# Patient Record
Sex: MALE | Race: WHITE | NOT HISPANIC OR LATINO | Employment: FULL TIME | URBAN - METROPOLITAN AREA
[De-identification: names, ages, dates, MRNs, and addresses within clinical notes are randomized per-mention and may not be internally consistent; named-entity substitution may affect disease eponyms.]

---

## 2018-09-04 ENCOUNTER — HOSPITAL ENCOUNTER (OUTPATIENT)
Dept: FAMILY MEDICINE CLINIC | Facility: CLINIC | Age: 38
Setting detail: SPECIMEN
Discharge: HOME | End: 2018-09-04
Attending: FAMILY MEDICINE | Admitting: FAMILY MEDICINE

## 2018-09-04 LAB
ALBUMIN SERPL-MCNC: 4.1 G/DL (ref 3.5–4.8)
ALBUMIN/GLOB SERPL: 1.6 {RATIO} (ref 1–1.7)
ALP SERPL-CCNC: 56 IU/L (ref 32–91)
ALT SERPL-CCNC: 22 IU/L (ref 17–63)
ANION GAP SERPL CALC-SCNC: 12.8 MMOL/L (ref 10–20)
AST SERPL-CCNC: 23 IU/L (ref 15–41)
BASOPHILS # BLD AUTO: 0.1 10*3/UL (ref 0–0.2)
BASOPHILS NFR BLD AUTO: 1 % (ref 0–2)
BILIRUB SERPL-MCNC: 0.8 MG/DL (ref 0.3–1.2)
BILIRUB UR QL STRIP: NEGATIVE MG/DL
BUN SERPL-MCNC: 14 MG/DL (ref 8–20)
BUN/CREAT SERPL: 10.8 (ref 6.2–20.3)
CALCIUM SERPL-MCNC: 9.2 MG/DL (ref 8.9–10.3)
CASTS URNS QL MICRO: NORMAL /[LPF]
CHLORIDE SERPL-SCNC: 104 MMOL/L (ref 101–111)
CHOLEST SERPL-MCNC: 220 MG/DL
CHOLEST/HDLC SERPL: 6.5 {RATIO}
COLOR UR: YELLOW
CONV BACTERIA IN URINE MICRO: NEGATIVE
CONV CLARITY OF URINE: CLEAR
CONV CO2: 28 MMOL/L (ref 22–32)
CONV HYALINE CASTS IN URINE MICRO: 0 /[LPF] (ref 0–5)
CONV LDL CHOLESTEROL DIRECT: 164 MG/DL (ref 0–100)
CONV PROTEIN IN URINE BY AUTOMATED TEST STRIP: NEGATIVE MG/DL
CONV SMALL ROUND CELLS: NORMAL /[HPF]
CONV TOTAL PROTEIN: 6.7 G/DL (ref 6.1–7.9)
CONV UROBILINOGEN IN URINE BY AUTOMATED TEST STRIP: 0.2 MG/DL
CREAT UR-MCNC: 1.3 MG/DL (ref 0.7–1.2)
CULTURE INDICATED?: NORMAL
DIFFERENTIAL METHOD BLD: (no result)
EOSINOPHIL # BLD AUTO: 0.2 10*3/UL (ref 0–0.3)
EOSINOPHIL # BLD AUTO: 2 % (ref 0–3)
ERYTHROCYTE [DISTWIDTH] IN BLOOD BY AUTOMATED COUNT: 13.2 % (ref 11.5–14.5)
GLOBULIN UR ELPH-MCNC: 2.6 G/DL (ref 2.5–3.8)
GLUCOSE SERPL-MCNC: 89 MG/DL (ref 65–99)
GLUCOSE UR QL: NEGATIVE MG/DL
HCT VFR BLD AUTO: 43.9 % (ref 40–54)
HDLC SERPL-MCNC: 34 MG/DL
HGB BLD-MCNC: 14.8 G/DL (ref 14–18)
HGB UR QL STRIP: NEGATIVE
KETONES UR QL STRIP: NEGATIVE MG/DL
LDLC/HDLC SERPL: 4.9 {RATIO}
LEUKOCYTE ESTERASE UR QL STRIP: NEGATIVE
LIPID INTERPRETATION: ABNORMAL
LYMPHOCYTES # BLD AUTO: 2.1 10*3/UL (ref 0.8–4.8)
LYMPHOCYTES NFR BLD AUTO: 23 % (ref 18–42)
MCH RBC QN AUTO: 28.3 PG (ref 26–32)
MCHC RBC AUTO-ENTMCNC: 33.7 G/DL (ref 32–36)
MCV RBC AUTO: 84.1 FL (ref 80–94)
MONOCYTES # BLD AUTO: 0.6 10*3/UL (ref 0.1–1.3)
MONOCYTES NFR BLD AUTO: 7 % (ref 2–11)
NEUTROPHILS # BLD AUTO: 5.9 10*3/UL (ref 2.3–8.6)
NEUTROPHILS NFR BLD AUTO: 67 % (ref 50–75)
NITRITE UR QL STRIP: NEGATIVE
NRBC BLD AUTO-RTO: 0 /100{WBCS}
NRBC/RBC NFR BLD MANUAL: 0 10*3/UL
PH UR STRIP.AUTO: 7 [PH] (ref 4.5–8)
PLATELET # BLD AUTO: 252 10*3/UL (ref 150–450)
PMV BLD AUTO: 9.2 FL (ref 7.4–10.4)
POTASSIUM SERPL-SCNC: 4.8 MMOL/L (ref 3.6–5.1)
RBC # BLD AUTO: 5.22 10*6/UL (ref 4.6–6)
RBC #/AREA URNS HPF: 0 /[HPF] (ref 0–3)
SODIUM SERPL-SCNC: 140 MMOL/L (ref 136–144)
SP GR UR: 1.02 (ref 1–1.03)
SPERM URNS QL MICRO: NORMAL /[HPF]
SQUAMOUS SPT QL MICRO: 0 /[HPF] (ref 0–5)
TRIGL SERPL-MCNC: 110 MG/DL
UNIDENT CRYS URNS QL MICRO: NORMAL /[HPF]
VLDLC SERPL CALC-MCNC: 22 MG/DL
WBC # BLD AUTO: 8.9 10*3/UL (ref 4.5–11.5)
WBC #/AREA URNS HPF: 0 /[HPF] (ref 0–5)
YEAST SPEC QL WET PREP: NORMAL /[HPF]

## 2019-12-04 RX ORDER — PHENTERMINE HYDROCHLORIDE 30 MG/1
CAPSULE ORAL
Qty: 30 CAPSULE | Refills: 0 | OUTPATIENT
Start: 2019-12-04

## 2019-12-04 NOTE — TELEPHONE ENCOUNTER
Patient left a voice message asking for this to be sent in for him.  Said he has been off of it for 6 months and he needs to start back on it again.

## 2019-12-07 DIAGNOSIS — E66.01 CLASS 2 SEVERE OBESITY DUE TO EXCESS CALORIES WITH SERIOUS COMORBIDITY AND BODY MASS INDEX (BMI) OF 38.0 TO 38.9 IN ADULT (HCC): Primary | ICD-10-CM

## 2019-12-09 RX ORDER — PHENTERMINE HYDROCHLORIDE 30 MG/1
CAPSULE ORAL
Qty: 30 CAPSULE | Refills: 0 | Status: SHIPPED | OUTPATIENT
Start: 2019-12-09 | End: 2020-01-06

## 2019-12-24 RX ORDER — NAPROXEN 500 MG/1
TABLET ORAL
Qty: 60 TABLET | Refills: 1 | Status: SHIPPED | OUTPATIENT
Start: 2019-12-24 | End: 2020-02-04 | Stop reason: HOSPADM

## 2020-01-05 DIAGNOSIS — E66.01 CLASS 2 SEVERE OBESITY DUE TO EXCESS CALORIES WITH SERIOUS COMORBIDITY AND BODY MASS INDEX (BMI) OF 38.0 TO 38.9 IN ADULT (HCC): ICD-10-CM

## 2020-01-06 RX ORDER — PHENTERMINE HYDROCHLORIDE 30 MG/1
CAPSULE ORAL
Qty: 30 CAPSULE | Refills: 0 | Status: SHIPPED | OUTPATIENT
Start: 2020-01-06 | End: 2020-03-27 | Stop reason: SDUPTHER

## 2020-01-27 ENCOUNTER — OFFICE VISIT (OUTPATIENT)
Dept: ORTHOPEDIC SURGERY | Facility: CLINIC | Age: 40
End: 2020-01-27

## 2020-01-27 VITALS
WEIGHT: 305 LBS | SYSTOLIC BLOOD PRESSURE: 151 MMHG | BODY MASS INDEX: 40.42 KG/M2 | DIASTOLIC BLOOD PRESSURE: 93 MMHG | HEART RATE: 69 BPM | HEIGHT: 73 IN

## 2020-01-27 DIAGNOSIS — S46.211A RUPTURE OF RIGHT DISTAL BICEPS TENDON, INITIAL ENCOUNTER: Primary | ICD-10-CM

## 2020-01-27 PROBLEM — I10 ESSENTIAL (PRIMARY) HYPERTENSION: Status: ACTIVE | Noted: 2017-10-11

## 2020-01-27 PROCEDURE — 99203 OFFICE O/P NEW LOW 30 MIN: CPT | Performed by: FAMILY MEDICINE

## 2020-01-27 NOTE — PROGRESS NOTES
"Primary Care Sports Medicine Office Visit Note     Patient ID: Scar Velásquez is a 39 y.o. male.    Chief Complaint:  Chief Complaint   Patient presents with   • Right Upper Arm - Initial Evaluation     HPI:    Mr. Scar Velásquez is a 39 y.o. male who presents to the clinic today for biceps tendon rupture evaluation. He states that 5 days ago, was lifting a trailer tongue with a co-worker. Felt two pops in his distal biceps. Did not experience all that much pain. Noticed later in the day that he had a defect and a new \"flat spot\" distally at the anterior elbow, and a bulge mid-belly portion of the biceps.  The next few days he noticed a considerable amount of swelling and bruising.  Still no significant or severe pain, but achiness at the level of elbow.  He also noticed immediate weakness of this arm now to elbow flexion activity.    Past Medical History:   Diagnosis Date   • Essential (primary) hypertension 10/11/2017   • Obesity 12/21/2015       Past Surgical History:   Procedure Laterality Date   • EAR TUBES         Family History   Problem Relation Age of Onset   • Cancer Mother    • Cancer Father      Social History     Occupational History   • Not on file   Tobacco Use   • Smoking status: Never Smoker   • Smokeless tobacco: Current User   Substance and Sexual Activity   • Alcohol use: Never     Frequency: Never   • Drug use: Not on file   • Sexual activity: Not on file      Review of Systems   Constitutional: Negative for activity change and fever.   Respiratory: Negative for cough and shortness of breath.    Cardiovascular: Negative for chest pain.   Gastrointestinal: Negative for constipation, diarrhea, nausea and vomiting.   Musculoskeletal: Positive for arthralgias.   Skin: Negative for color change and rash.   Neurological: Negative for weakness.   Hematological: Does not bruise/bleed easily.       Objective:    /93 (BP Location: Left arm, Patient Position: Sitting, Cuff Size: Large Adult)   Pulse " "69   Ht 185.4 cm (73\")   Wt (!) 138 kg (305 lb)   BMI 40.24 kg/m²     Physical Examination:  Physical Exam   Constitutional: He appears well-developed and well-nourished. No distress.   HENT:   Head: Normocephalic and atraumatic.   Eyes: Conjunctivae are normal.   Cardiovascular: Intact distal pulses.   Pulmonary/Chest: Effort normal. No respiratory distress.   Neurological: He is alert.   Skin: Skin is warm. Capillary refill takes less than 2 seconds. He is not diaphoretic.   Nursing note and vitals reviewed.    Right Elbow Exam     Tenderness   The patient is experiencing no tenderness.     Range of Motion   Extension: normal   Flexion: normal   Pronation: normal   Supination: normal     Muscle Strength   Pronation:  5/5   Supination:  4/5     Comments:  Flexion strength 3/5, painful.  There is a palpable Bony deformity to the mid muscle belly of the biceps, with no palpable distal biceps cord.  Moderate swelling with gravity distal to the elbow and the forearm and hand.  Distal pulses are intact.  There is moderate bruising to the antecubital fossa in multiple stages of resorption.          Imaging and other tests:  MRI elbow ordered today.    Assessment and Plan:    1. Rupture of right distal biceps tendon, initial encounter  - MRI Elbow Right Without Contrast; Future    I discussed with the patient and his wife today that ultimately he has likely completely torn his distal biceps insertion.  With the amount of retraction bruising that he has, and weakness, I feel that surgical repair is relatively his only option if he would like to have the function of his elbow for the rest of his life.  We discussed his case with our upper extremity surgeon Dr. Richard Berger, who will see him next week preoperatively.  Until then he can continue icing, elevation for swelling.  I would also like to go ahead and get an MRI for evaluation of tendon integrity for preoperative planning.  RTC status post MRI for " "preoperative visit with Dr. Daniels.    Clint WATKINS \"Chance\" Aurelio JACKSON, , CAQSM  01/29/20  9:53 AM    Disclaimer: Please note that areas of this note were completed with computer voice recognition software.  Quite often unanticipated grammatical, syntax, homophones, and other interpretive errors are inadvertently transcribed by the computer software. Please excuse any errors that have escaped final proofreading.  "

## 2020-01-29 ENCOUNTER — OFFICE VISIT (OUTPATIENT)
Dept: ORTHOPEDIC SURGERY | Facility: CLINIC | Age: 40
End: 2020-01-29

## 2020-01-29 ENCOUNTER — PREP FOR SURGERY (OUTPATIENT)
Dept: OTHER | Facility: HOSPITAL | Age: 40
End: 2020-01-29

## 2020-01-29 VITALS
WEIGHT: 305 LBS | HEART RATE: 91 BPM | SYSTOLIC BLOOD PRESSURE: 150 MMHG | DIASTOLIC BLOOD PRESSURE: 93 MMHG | BODY MASS INDEX: 40.42 KG/M2 | HEIGHT: 73 IN

## 2020-01-29 DIAGNOSIS — S46.211D RUPTURE OF RIGHT DISTAL BICEPS TENDON, SUBSEQUENT ENCOUNTER: Primary | ICD-10-CM

## 2020-01-29 PROBLEM — S46.211A RUPTURE OF RIGHT DISTAL BICEPS TENDON: Status: ACTIVE | Noted: 2020-01-29

## 2020-01-29 PROCEDURE — 99214 OFFICE O/P EST MOD 30 MIN: CPT | Performed by: ORTHOPAEDIC SURGERY

## 2020-01-29 RX ORDER — CEFAZOLIN SODIUM IN 0.9 % NACL 3 G/100 ML
3 INTRAVENOUS SOLUTION, PIGGYBACK (ML) INTRAVENOUS ONCE
Status: CANCELLED | OUTPATIENT
Start: 2020-01-29 | End: 2020-01-29

## 2020-01-29 NOTE — PROGRESS NOTES
"     Patient ID: Scar Velásquez is a 39 y.o. male.  Right elbow pain Scar is a 39-year-old gentleman who was lifting an item at work about a week ago when he felt a pop in his right arm.  He developed significant bruising and deformity of the bicep muscles that time.  Pain is dull and a 2/10 and worse with lifting and better with an Ace wrap and rest.  He has been on light duty.    Review of Systems:    Right elbow pain  Denies chest pain    Objective:    /93   Pulse 91   Ht 185.4 cm (73\")   Wt (!) 138 kg (305 lb)   BMI 40.24 kg/m²     Physical Examination:   He is a pleasant male in no distress. He is alert and oriented x3 and appears his stated age.  Right arm demonstrates Bony deformity with proximal migration of the bicep muscle and pain and a defect along the distal insertion.  There is mild swelling in the arm, compartments are soft.  Supple passive range of motion of the elbow.Sensory and motor exam are intact all distributions. Radial pulse is palpable and capillary refill is less than two seconds to all digits      Imaging:   MRI demonstrates full-thickness distal bicep rupture with about 8 cm of retraction    Assessment:    Right distal bicep rupture    Plan:  I recommend right distal bicep repair.Risks and benefits, specifically risks of bleeding, scar, infection, fracture, stiffness, retear, nerve, tendon, artery damage, the need for further surgery, DVT, and loss of life or limb and rehab were discussed. All questions were answered and addressed.          Disclaimer: Please note that areas of this note were completed with computer voice recognition software.  Quite often unanticipated grammatical, syntax, homophones, and other interpretive errors are inadvertently transcribed by the computer software. Please excuse any errors that have escaped final proofreading.  "

## 2020-01-31 ENCOUNTER — APPOINTMENT (OUTPATIENT)
Dept: PREADMISSION TESTING | Facility: HOSPITAL | Age: 40
End: 2020-01-31

## 2020-01-31 VITALS
WEIGHT: 301.8 LBS | TEMPERATURE: 98.2 F | RESPIRATION RATE: 14 BRPM | HEIGHT: 73 IN | HEART RATE: 75 BPM | BODY MASS INDEX: 40 KG/M2 | SYSTOLIC BLOOD PRESSURE: 138 MMHG | DIASTOLIC BLOOD PRESSURE: 89 MMHG

## 2020-01-31 DIAGNOSIS — S46.211D RUPTURE OF RIGHT DISTAL BICEPS TENDON, SUBSEQUENT ENCOUNTER: ICD-10-CM

## 2020-01-31 LAB
ABO GROUP BLD: NORMAL
ANION GAP SERPL CALCULATED.3IONS-SCNC: 9 MMOL/L (ref 5–15)
APTT PPP: 24.4 SECONDS (ref 24–31)
BASOPHILS # BLD AUTO: 0.1 10*3/MM3 (ref 0–0.2)
BASOPHILS NFR BLD AUTO: 1 % (ref 0–1.5)
BLD GP AB SCN SERPL QL: NEGATIVE
BUN BLD-MCNC: 19 MG/DL (ref 6–20)
BUN/CREAT SERPL: 15.6 (ref 7–25)
CALCIUM SPEC-SCNC: 9.6 MG/DL (ref 8.6–10.5)
CHLORIDE SERPL-SCNC: 101 MMOL/L (ref 98–107)
CO2 SERPL-SCNC: 29 MMOL/L (ref 22–29)
CREAT BLD-MCNC: 1.22 MG/DL (ref 0.76–1.27)
DEPRECATED RDW RBC AUTO: 39.8 FL (ref 37–54)
EOSINOPHIL # BLD AUTO: 0.2 10*3/MM3 (ref 0–0.4)
EOSINOPHIL NFR BLD AUTO: 2.3 % (ref 0.3–6.2)
ERYTHROCYTE [DISTWIDTH] IN BLOOD BY AUTOMATED COUNT: 13.6 % (ref 12.3–15.4)
GFR SERPL CREATININE-BSD FRML MDRD: 66 ML/MIN/1.73
GLUCOSE BLD-MCNC: 84 MG/DL (ref 65–99)
HCT VFR BLD AUTO: 43.8 % (ref 37.5–51)
HGB BLD-MCNC: 15.1 G/DL (ref 13–17.7)
INR PPP: 1 (ref 0.9–1.1)
LYMPHOCYTES # BLD AUTO: 2.1 10*3/MM3 (ref 0.7–3.1)
LYMPHOCYTES NFR BLD AUTO: 26.2 % (ref 19.6–45.3)
MCH RBC QN AUTO: 28.5 PG (ref 26.6–33)
MCHC RBC AUTO-ENTMCNC: 34.5 G/DL (ref 31.5–35.7)
MCV RBC AUTO: 82.4 FL (ref 79–97)
MONOCYTES # BLD AUTO: 0.6 10*3/MM3 (ref 0.1–0.9)
MONOCYTES NFR BLD AUTO: 7.3 % (ref 5–12)
MRSA DNA SPEC QL NAA+PROBE: NORMAL
NEUTROPHILS # BLD AUTO: 5 10*3/MM3 (ref 1.7–7)
NEUTROPHILS NFR BLD AUTO: 63.2 % (ref 42.7–76)
NRBC BLD AUTO-RTO: 0.1 /100 WBC (ref 0–0.2)
PLATELET # BLD AUTO: 250 10*3/MM3 (ref 140–450)
PMV BLD AUTO: 8.2 FL (ref 6–12)
POTASSIUM BLD-SCNC: 4.7 MMOL/L (ref 3.5–5.2)
PROTHROMBIN TIME: 10.5 SECONDS (ref 9.6–11.7)
RBC # BLD AUTO: 5.31 10*6/MM3 (ref 4.14–5.8)
RH BLD: POSITIVE
SODIUM BLD-SCNC: 139 MMOL/L (ref 136–145)
T&S EXPIRATION DATE: NORMAL
WBC NRBC COR # BLD: 7.9 10*3/MM3 (ref 3.4–10.8)

## 2020-01-31 PROCEDURE — 86850 RBC ANTIBODY SCREEN: CPT | Performed by: ORTHOPAEDIC SURGERY

## 2020-01-31 PROCEDURE — 85730 THROMBOPLASTIN TIME PARTIAL: CPT | Performed by: ORTHOPAEDIC SURGERY

## 2020-01-31 PROCEDURE — 85610 PROTHROMBIN TIME: CPT | Performed by: ORTHOPAEDIC SURGERY

## 2020-01-31 PROCEDURE — 86900 BLOOD TYPING SEROLOGIC ABO: CPT | Performed by: ORTHOPAEDIC SURGERY

## 2020-01-31 PROCEDURE — 86901 BLOOD TYPING SEROLOGIC RH(D): CPT

## 2020-01-31 PROCEDURE — 36415 COLL VENOUS BLD VENIPUNCTURE: CPT

## 2020-01-31 PROCEDURE — 85025 COMPLETE CBC W/AUTO DIFF WBC: CPT | Performed by: ORTHOPAEDIC SURGERY

## 2020-01-31 PROCEDURE — 86900 BLOOD TYPING SEROLOGIC ABO: CPT

## 2020-01-31 PROCEDURE — 87641 MR-STAPH DNA AMP PROBE: CPT | Performed by: ORTHOPAEDIC SURGERY

## 2020-01-31 PROCEDURE — 80048 BASIC METABOLIC PNL TOTAL CA: CPT | Performed by: ORTHOPAEDIC SURGERY

## 2020-01-31 PROCEDURE — 93005 ELECTROCARDIOGRAM TRACING: CPT

## 2020-01-31 PROCEDURE — 86901 BLOOD TYPING SEROLOGIC RH(D): CPT | Performed by: ORTHOPAEDIC SURGERY

## 2020-02-02 DIAGNOSIS — E66.01 CLASS 2 SEVERE OBESITY DUE TO EXCESS CALORIES WITH SERIOUS COMORBIDITY AND BODY MASS INDEX (BMI) OF 38.0 TO 38.9 IN ADULT (HCC): ICD-10-CM

## 2020-02-02 PROCEDURE — 93010 ELECTROCARDIOGRAM REPORT: CPT | Performed by: INTERNAL MEDICINE

## 2020-02-03 ENCOUNTER — ANESTHESIA EVENT (OUTPATIENT)
Dept: PERIOP | Facility: HOSPITAL | Age: 40
End: 2020-02-03

## 2020-02-03 DIAGNOSIS — S46.211D RUPTURE OF RIGHT DISTAL BICEPS TENDON, SUBSEQUENT ENCOUNTER: Primary | ICD-10-CM

## 2020-02-03 RX ORDER — NAPROXEN 500 MG/1
500 TABLET ORAL 2 TIMES DAILY WITH MEALS
Qty: 28 TABLET | Refills: 0 | Status: SHIPPED | OUTPATIENT
Start: 2020-02-03 | End: 2020-03-12

## 2020-02-03 RX ORDER — OXYCODONE HYDROCHLORIDE AND ACETAMINOPHEN 5; 325 MG/1; MG/1
1 TABLET ORAL EVERY 6 HOURS PRN
Qty: 28 TABLET | Refills: 0 | Status: SHIPPED | OUTPATIENT
Start: 2020-02-03 | End: 2020-03-12

## 2020-02-03 RX ORDER — PHENTERMINE HYDROCHLORIDE 30 MG/1
CAPSULE ORAL
Qty: 30 CAPSULE | OUTPATIENT
Start: 2020-02-03

## 2020-02-03 RX ORDER — PROMETHAZINE HYDROCHLORIDE 25 MG/1
25 TABLET ORAL EVERY 6 HOURS PRN
Qty: 21 TABLET | Refills: 1 | Status: SHIPPED | OUTPATIENT
Start: 2020-02-03 | End: 2020-03-12

## 2020-02-03 RX ORDER — CEPHALEXIN 500 MG/1
500 CAPSULE ORAL 4 TIMES DAILY
Qty: 4 CAPSULE | Refills: 0 | Status: SHIPPED | OUTPATIENT
Start: 2020-02-03 | End: 2020-02-04

## 2020-02-04 ENCOUNTER — HOSPITAL ENCOUNTER (OUTPATIENT)
Facility: HOSPITAL | Age: 40
Setting detail: HOSPITAL OUTPATIENT SURGERY
Discharge: HOME | End: 2020-02-04
Attending: ORTHOPAEDIC SURGERY | Admitting: ORTHOPAEDIC SURGERY

## 2020-02-04 ENCOUNTER — ANESTHESIA (OUTPATIENT)
Dept: PERIOP | Facility: HOSPITAL | Age: 40
End: 2020-02-04

## 2020-02-04 ENCOUNTER — APPOINTMENT (OUTPATIENT)
Dept: GENERAL RADIOLOGY | Facility: HOSPITAL | Age: 40
End: 2020-02-04

## 2020-02-04 ENCOUNTER — HOSPITAL ENCOUNTER (OUTPATIENT)
Dept: GENERAL RADIOLOGY | Facility: HOSPITAL | Age: 40
End: 2020-02-04

## 2020-02-04 ENCOUNTER — APPOINTMENT (OUTPATIENT)
Dept: GENERAL RADIOLOGY | Facility: HOSPITAL | Age: 40
End: 2020-02-04
Payer: OTHER MISCELLANEOUS

## 2020-02-04 ENCOUNTER — HOSPITAL ENCOUNTER (OUTPATIENT)
Dept: GENERAL RADIOLOGY | Facility: HOSPITAL | Age: 40
Discharge: HOME | End: 2020-02-04
Admitting: ORTHOPAEDIC SURGERY

## 2020-02-04 VITALS
HEART RATE: 81 BPM | DIASTOLIC BLOOD PRESSURE: 79 MMHG | RESPIRATION RATE: 15 BRPM | SYSTOLIC BLOOD PRESSURE: 127 MMHG | OXYGEN SATURATION: 95 % | TEMPERATURE: 97.3 F

## 2020-02-04 DIAGNOSIS — S46.211D RUPTURE OF RIGHT DISTAL BICEPS TENDON, SUBSEQUENT ENCOUNTER: ICD-10-CM

## 2020-02-04 PROCEDURE — 25010000002 ONDANSETRON PER 1 MG: Performed by: ANESTHESIOLOGY

## 2020-02-04 PROCEDURE — 25010000002 ROPIVACAINE PER 1 MG: Performed by: ANESTHESIOLOGY

## 2020-02-04 PROCEDURE — 25010000002 MIDAZOLAM PER 1 MG: Performed by: ANESTHESIOLOGY

## 2020-02-04 PROCEDURE — 25010000002 DEXAMETHASONE PER 1 MG: Performed by: ANESTHESIOLOGY

## 2020-02-04 PROCEDURE — 76000 FLUOROSCOPY <1 HR PHYS/QHP: CPT

## 2020-02-04 PROCEDURE — 25010000002 PROPOFOL 10 MG/ML EMULSION: Performed by: ANESTHESIOLOGY

## 2020-02-04 PROCEDURE — 73070 X-RAY EXAM OF ELBOW: CPT

## 2020-02-04 PROCEDURE — 25010000002 FENTANYL CITRATE (PF) 100 MCG/2ML SOLUTION: Performed by: ANESTHESIOLOGY

## 2020-02-04 PROCEDURE — C1713 ANCHOR/SCREW BN/BN,TIS/BN: HCPCS | Performed by: ORTHOPAEDIC SURGERY

## 2020-02-04 PROCEDURE — 24342 REPAIR OF RUPTURED TENDON: CPT | Performed by: ORTHOPAEDIC SURGERY

## 2020-02-04 DEVICE — KT BIOTENODESIS W/FW4PK: Type: IMPLANTABLE DEVICE | Site: ARM | Status: FUNCTIONAL

## 2020-02-04 DEVICE — SUT FIBERLINK BR PB NO2 W/CLSDLP 1.5IN: Type: IMPLANTABLE DEVICE | Site: ARM | Status: FUNCTIONAL

## 2020-02-04 DEVICE — KT BUTTON REPR SHLDR DIST PEEK 7X10MM: Type: IMPLANTABLE DEVICE | Site: ARM | Status: FUNCTIONAL

## 2020-02-04 RX ORDER — DEXAMETHASONE SODIUM PHOSPHATE 4 MG/ML
INJECTION, SOLUTION INTRA-ARTICULAR; INTRALESIONAL; INTRAMUSCULAR; INTRAVENOUS; SOFT TISSUE AS NEEDED
Status: DISCONTINUED | OUTPATIENT
Start: 2020-02-04 | End: 2020-02-04 | Stop reason: SURG

## 2020-02-04 RX ORDER — SODIUM CHLORIDE 0.9 % (FLUSH) 0.9 %
10 SYRINGE (ML) INJECTION AS NEEDED
Status: DISCONTINUED | OUTPATIENT
Start: 2020-02-04 | End: 2020-02-04 | Stop reason: HOSPADM

## 2020-02-04 RX ORDER — ONDANSETRON 2 MG/ML
4 INJECTION INTRAMUSCULAR; INTRAVENOUS ONCE AS NEEDED
Status: DISCONTINUED | OUTPATIENT
Start: 2020-02-04 | End: 2020-02-04 | Stop reason: HOSPADM

## 2020-02-04 RX ORDER — ROPIVACAINE HYDROCHLORIDE 5 MG/ML
INJECTION, SOLUTION EPIDURAL; INFILTRATION; PERINEURAL
Status: COMPLETED | OUTPATIENT
Start: 2020-02-04 | End: 2020-02-04

## 2020-02-04 RX ORDER — FENTANYL CITRATE 50 UG/ML
INJECTION, SOLUTION INTRAMUSCULAR; INTRAVENOUS AS NEEDED
Status: DISCONTINUED | OUTPATIENT
Start: 2020-02-04 | End: 2020-02-04 | Stop reason: SURG

## 2020-02-04 RX ORDER — DEXAMETHASONE SODIUM PHOSPHATE 4 MG/ML
INJECTION, SOLUTION INTRA-ARTICULAR; INTRALESIONAL; INTRAMUSCULAR; INTRAVENOUS; SOFT TISSUE
Status: COMPLETED | OUTPATIENT
Start: 2020-02-04 | End: 2020-02-04

## 2020-02-04 RX ORDER — SODIUM CHLORIDE 9 MG/ML
9 INJECTION, SOLUTION INTRAVENOUS CONTINUOUS PRN
Status: DISCONTINUED | OUTPATIENT
Start: 2020-02-04 | End: 2020-02-04 | Stop reason: HOSPADM

## 2020-02-04 RX ORDER — MIDAZOLAM HYDROCHLORIDE 1 MG/ML
INJECTION INTRAMUSCULAR; INTRAVENOUS
Status: COMPLETED | OUTPATIENT
Start: 2020-02-04 | End: 2020-02-04

## 2020-02-04 RX ORDER — LIDOCAINE HYDROCHLORIDE 10 MG/ML
INJECTION, SOLUTION EPIDURAL; INFILTRATION; INTRACAUDAL; PERINEURAL AS NEEDED
Status: DISCONTINUED | OUTPATIENT
Start: 2020-02-04 | End: 2020-02-04 | Stop reason: SURG

## 2020-02-04 RX ORDER — SODIUM CHLORIDE, SODIUM LACTATE, POTASSIUM CHLORIDE, CALCIUM CHLORIDE 600; 310; 30; 20 MG/100ML; MG/100ML; MG/100ML; MG/100ML
1000 INJECTION, SOLUTION INTRAVENOUS CONTINUOUS
Status: DISCONTINUED | OUTPATIENT
Start: 2020-02-04 | End: 2020-02-04 | Stop reason: HOSPADM

## 2020-02-04 RX ORDER — HYDROMORPHONE HCL 110MG/55ML
0.5 PATIENT CONTROLLED ANALGESIA SYRINGE INTRAVENOUS
Status: DISCONTINUED | OUTPATIENT
Start: 2020-02-04 | End: 2020-02-04 | Stop reason: HOSPADM

## 2020-02-04 RX ORDER — ONDANSETRON 2 MG/ML
INJECTION INTRAMUSCULAR; INTRAVENOUS AS NEEDED
Status: DISCONTINUED | OUTPATIENT
Start: 2020-02-04 | End: 2020-02-04 | Stop reason: SURG

## 2020-02-04 RX ORDER — CEFAZOLIN SODIUM IN 0.9 % NACL 3 G/100 ML
3 INTRAVENOUS SOLUTION, PIGGYBACK (ML) INTRAVENOUS ONCE
Status: DISCONTINUED | OUTPATIENT
Start: 2020-02-04 | End: 2020-02-04 | Stop reason: HOSPADM

## 2020-02-04 RX ORDER — PROPOFOL 10 MG/ML
VIAL (ML) INTRAVENOUS AS NEEDED
Status: DISCONTINUED | OUTPATIENT
Start: 2020-02-04 | End: 2020-02-04 | Stop reason: SURG

## 2020-02-04 RX ORDER — SODIUM CHLORIDE 0.9 % (FLUSH) 0.9 %
10 SYRINGE (ML) INJECTION EVERY 12 HOURS SCHEDULED
Status: DISCONTINUED | OUTPATIENT
Start: 2020-02-04 | End: 2020-02-04 | Stop reason: HOSPADM

## 2020-02-04 RX ADMIN — CEFAZOLIN SODIUM 3 G: 1 INJECTION, POWDER, FOR SOLUTION INTRAMUSCULAR; INTRAVENOUS at 14:20

## 2020-02-04 RX ADMIN — FENTANYL CITRATE 100 MCG: 50 INJECTION, SOLUTION INTRAMUSCULAR; INTRAVENOUS at 14:10

## 2020-02-04 RX ADMIN — ONDANSETRON 4 MG: 2 INJECTION INTRAMUSCULAR; INTRAVENOUS at 15:31

## 2020-02-04 RX ADMIN — DEXAMETHASONE SODIUM PHOSPHATE 8 MG: 4 INJECTION, SOLUTION INTRAMUSCULAR; INTRAVENOUS at 14:14

## 2020-02-04 RX ADMIN — SODIUM CHLORIDE, SODIUM LACTATE, POTASSIUM CHLORIDE, AND CALCIUM CHLORIDE 1000 ML: 600; 310; 30; 20 INJECTION, SOLUTION INTRAVENOUS at 13:13

## 2020-02-04 RX ADMIN — ROPIVACAINE HYDROCHLORIDE 30 ML: 5 INJECTION, SOLUTION EPIDURAL; INFILTRATION; PERINEURAL at 13:00

## 2020-02-04 RX ADMIN — DEXAMETHASONE SODIUM PHOSPHATE 4 MG: 4 INJECTION, SOLUTION INTRAMUSCULAR; INTRAVENOUS at 13:00

## 2020-02-04 RX ADMIN — LIDOCAINE HYDROCHLORIDE 50 MG: 10 INJECTION, SOLUTION EPIDURAL; INFILTRATION; INTRACAUDAL; PERINEURAL at 14:10

## 2020-02-04 RX ADMIN — PROPOFOL 200 MG: 10 INJECTION, EMULSION INTRAVENOUS at 14:10

## 2020-02-04 RX ADMIN — MIDAZOLAM 2 MG: 1 INJECTION INTRAMUSCULAR; INTRAVENOUS at 13:00

## 2020-02-04 NOTE — ANESTHESIA PROCEDURE NOTES
Airway  Urgency: elective    Date/Time: 2/4/2020 2:12 PM  Airway not difficult    General Information and Staff    Patient location during procedure: OR  Anesthesiologist: Manuel Avina MD    Indications and Patient Condition  Indications for airway management: airway protection    Preoxygenated: yes  Mask difficulty assessment: 1 - vent by mask    Final Airway Details  Final airway type: supraglottic airway      Successful airway: LMA  Size 4    Number of attempts at approach: 1

## 2020-02-04 NOTE — ANESTHESIA POSTPROCEDURE EVALUATION
Patient: Scar Velásquez    Procedure Summary     Date:  02/04/20 Room / Location:  [facility] OR 08 / [facility] MAIN OR    Anesthesia Start:  1405 Anesthesia Stop:  1559    Procedure:  TENDON DISTAL BICEPS REPAIR (Right Arm Upper) Diagnosis:       Rupture of right distal biceps tendon, subsequent encounter      (Rupture of right distal biceps tendon, subsequent encounter [S46.211D])    Surgeon:  Richard Berger MD Provider:  Manuel Avina MD    Anesthesia Type:  general with block ASA Status:  3          Anesthesia Type: general with block    Vitals  Vitals Value Taken Time   /80 2/4/2020  4:23 PM   Temp 97.8 °F (36.6 °C) 2/4/2020  4:00 PM   Pulse 85 2/4/2020  4:25 PM   Resp 13 2/4/2020  4:15 PM   SpO2 93 % 2/4/2020  4:25 PM   Vitals shown include unvalidated device data.        Post Anesthesia Care and Evaluation    Patient location during evaluation: PACU  Patient participation: complete - patient participated  Level of consciousness: awake  Pain management: adequate  Airway patency: patent  Anesthetic complications: No anesthetic complications  PONV Status: controlled  Cardiovascular status: acceptable  Respiratory status: acceptable  Hydration status: acceptable

## 2020-02-04 NOTE — OP NOTE
TENDON DISTAL BICEPS REPAIR/RELEASE  Procedure Report    Patient Name:  Scar Velásquez  YOB: 1980    Date of Surgery:  2/4/2020     Indications: This is a 39 y.o. male with an injury to the right elbow.  Workup demonstrated distal bicep rupture.Treatment options were discussed.  They desired to proceed with distal bicep repair after discussing the risks including bleeding, scarring,infection, stiffness, nerve damage, tendon damage, artery damage, continued pain, DVT, fracture, loss of life or limb, and a need for further surgery including hardware removal.      Pre-op Diagnosis:   Rupture of right distal biceps tendon, subsequent encounter [S46.211D]    Postop Diagnosis       Post-Op Diagnosis Codes:     * Rupture of right distal biceps tendon, subsequent encounter [S46.211D]    Procedure/CPT® Codes:  43351    Procedure    Procedure(s):  TENDON DISTAL BICEPS REPAIR/RELEASE    Assistant: Rolando Paulino certified first assist      Anesthesia: General with Block    IVF: See anesthesia record    Estimated Blood Loss: minimal    Implants:    Implant Name Type Inv. Item Serial No.  Lot No. LRB No. Used   KT BIOTENODESIS W/FW4PK - [id number] Implant KT BIOTENODESIS W/FW4PK  [employer] [id number] Right 1   KT BUTTON REPR SHLDR DIST PEEK 7X10MM - [id number] Implant KT BUTTON REPR SHLDR DIST PEEK 7X10MM  [employer] [id number] Right 1   SUT FIBERLINK BR PB NO2 W/CLSDLP 1.5IN - [id number] Implant SUT FIBERLINK BR PB NO2 W/CLSDLP 1.5IN  [employer] . Right 1         Complications: None    Tourniquet: none    Description of Procedure: The patient's operative site was marked.  Regional  anesthesia was administered.  Patient was brought to the operating room and placed on the operating room table.  General anesthesia was administered. Antibiotics were dosed.  A timeout was taken to confirm the correct operative site and procedure.    The arm was then prepped and draped in a standard surgical fashion.    Incision was  marked under fluoroscopic guidance.  Transverse incision opened, skin and flaps raised.  Lateral antebrachial cutaneous nerve identified and protected.    The bicep was identified proximally and released.  It was prepared into 7 mm tendon and whipstitched.  Tuberosity was identified and cleaned of soft tissue and a bicortical pin placed.  Unicortical socket created and the button was attached to the tendon whipstitch sutures.  It was passed through the socket and flipped and verified under fluoroscopy.  Elbow was flexed and sutures were used to pull the tendon into the socket.  One limb was passed through the tendon and then tied and then passed up through the  and the screw was placed on the radial aspect of the tuberosity.  The sutures were again tied.  Wound was irrigated and closed with suture and glue and a sterile dressing applied.  Long-arm splint placed.  There was good capillary refill.  All counts were correct.  No complications.  Taken to recovery room in satisfactory condition        Richard Berger MD     Date: 2/4/2020  Time: 3:39 PM

## 2020-02-04 NOTE — ANESTHESIA PREPROCEDURE EVALUATION
Anesthesia Evaluation     Patient summary reviewed and Nursing notes reviewed   NPO Solid Status: > 6 hours  NPO Liquid Status: > 6 hours           Airway   Mallampati: II  TM distance: >3 FB  Neck ROM: full  Possible difficult intubation  Dental - normal exam     Pulmonary - negative pulmonary ROS and normal exam    breath sounds clear to auscultation  Cardiovascular - normal exam    ECG reviewed  Rhythm: regular  Rate: normal    (+) hypertension,       Neuro/Psych- negative ROS  GI/Hepatic/Renal/Endo    (+) obesity, morbid obesity,      Musculoskeletal (-) negative ROS    Abdominal   (+) obese,     Abdomen: soft.  Bowel sounds: normal.   Substance History - negative use     OB/GYN negative ob/gyn ROS         Other - negative ROS                     Anesthesia Plan    ASA 3     general with block     intravenous induction     Anesthetic plan, all risks, benefits, and alternatives have been provided, discussed and informed consent has been obtained with: patient.    Plan discussed with CAA.

## 2020-02-04 NOTE — ANESTHESIA PROCEDURE NOTES
Peripheral Block      Patient reassessed immediately prior to procedure    Patient location during procedure: pre-op  Start time: 2/4/2020 12:50 PM  Stop time: 2/4/2020 1:00 PM  Reason for block: procedure for pain, at surgeon's request and post-op pain management  Performed by  Anesthesiologist: Luther Oropeza MD  Assisted by: Bo Abraham RN  Preanesthetic Checklist  Completed: patient identified, site marked, surgical consent, pre-op evaluation, timeout performed, IV checked, risks and benefits discussed and monitors and equipment checked  Prep:  Pt Position: supine  Sterile barriers:cap, gloves, sterile barriers, mask and gown  Prep: ChloraPrep  Patient monitoring: blood pressure monitoring, continuous pulse oximetry and EKG  Procedure  Sedation:yes  Performed under: local infiltration  Guidance:ultrasound guided  ULTRASOUND INTERPRETATION. Using ultrasound guidance a 20 G gauge needle was placed in close proximity to the nerve, at which point, under ultrasound guidance anesthetic was injected in the area of the nerve and spread of the anesthesia was seen on ultrasound in close proximity thereto.  There were no abnormalities seen on ultrasound; a digital image was taken; and the patient tolerated the procedure with no complications. Images:still images obtained, printed/placed on chart    Laterality:right  Block Type:supraclavicular  Injection Technique:single-shot  Needle Type:echogenic  Needle Gauge:20 G  Resistance on Injection: less than 15 psi  Sedation medications used: midazolam (VERSED) injection, 2 mg  Medications Used: ropivacaine (NAROPIN) 0.5 % injection, 30 mL  dexamethasone (DECADRON) injection, 4 mg      Post Assessment  Injection Assessment: negative aspiration for heme, no paresthesia on injection and incremental injection  Patient Tolerance:comfortable throughout block  Complications:no

## 2020-02-10 ENCOUNTER — OFFICE VISIT (OUTPATIENT)
Dept: ORTHOPEDIC SURGERY | Facility: CLINIC | Age: 40
End: 2020-02-10

## 2020-02-10 VITALS
WEIGHT: 301 LBS | DIASTOLIC BLOOD PRESSURE: 90 MMHG | SYSTOLIC BLOOD PRESSURE: 141 MMHG | BODY MASS INDEX: 39.89 KG/M2 | HEART RATE: 103 BPM | HEIGHT: 73 IN

## 2020-02-10 DIAGNOSIS — Z47.89 ORTHOPEDIC AFTERCARE: ICD-10-CM

## 2020-02-10 DIAGNOSIS — S46.211D RUPTURE OF RIGHT DISTAL BICEPS TENDON, SUBSEQUENT ENCOUNTER: Primary | ICD-10-CM

## 2020-02-10 PROCEDURE — 99024 POSTOP FOLLOW-UP VISIT: CPT | Performed by: ORTHOPAEDIC SURGERY

## 2020-02-10 NOTE — PROGRESS NOTES
"     Patient ID: Scar Velásquez is a 39 y.o. male.  2/4/20 right distal bicep repair pain minimal        Objective:    /90   Pulse 103   Ht 185.4 cm (73\")   Wt (!) 137 kg (301 lb)   BMI 39.71 kg/m²     Physical Examination:    Dressing is dry, compartments are soft.  He has some mild tingling over the dorsum of the thumb but intact nerve function to the hand and capillary refill    Imaging:      Assessment:  Doing well after distal bicep repair    Plan:  Begin physical therapy, elbow brace at home.  Remove dressing next week and see me in a month  "

## 2020-02-14 ENCOUNTER — TELEPHONE (OUTPATIENT)
Dept: ORTHOPEDIC SURGERY | Facility: CLINIC | Age: 40
End: 2020-02-14

## 2020-02-14 NOTE — TELEPHONE ENCOUNTER
Spoke with wife, states that he is having more of a stinging sensation now. She states that she is not concerned, but just thought we should know. I let her know that can happen sometimes and it would just take some time to get better. Did let her know if it gets worse or they do start to get concerned to give us a call.

## 2020-02-14 NOTE — TELEPHONE ENCOUNTER
Patient wife called with some questions about the tingling yony is having in his thumb after surgery. Tried to call her back, but LVM.

## 2020-02-17 ENCOUNTER — TREATMENT (OUTPATIENT)
Dept: PHYSICAL THERAPY | Facility: CLINIC | Age: 40
End: 2020-02-17

## 2020-02-17 DIAGNOSIS — S46.211D RUPTURE OF DISTAL BICEPS TENDON, RIGHT, SUBSEQUENT ENCOUNTER: Primary | ICD-10-CM

## 2020-02-17 PROCEDURE — 97140 MANUAL THERAPY 1/> REGIONS: CPT | Performed by: PHYSICAL THERAPIST

## 2020-02-17 PROCEDURE — 97110 THERAPEUTIC EXERCISES: CPT | Performed by: PHYSICAL THERAPIST

## 2020-02-17 PROCEDURE — 97162 PT EVAL MOD COMPLEX 30 MIN: CPT | Performed by: PHYSICAL THERAPIST

## 2020-02-17 NOTE — PROGRESS NOTES
"  Physical Therapy Initial Evaluation and Plan of Care    Patient: Scar Velásquez   : 1980  Diagnosis/ICD-10 Code:  Rupture of distal biceps tendon, right, subsequent encounter [S46.211D]  Referring practitioner: Richard Berger, *  Date of Initial Visit: 2020  Today's Date: 2020  Patient seen for 1 sessions           Subjective Questionnaire: QuickDASH: 66%      Subjective Evaluation    History of Present Illness  Date of surgery: 2020  Mechanism of injury: Patient presents to physical therapy s/p distal biceps tendon repair on right UE that occurred on 20.  Patient reports that during initial injury he did not feel much pain and had little bruising.  However noticed \"flat spot\" just above elbow.  Patient is currently in a brace on right arm locked at 90 degrees.  Patient is in brace at all times except for showering and getting dressed.  Patient reports numbness and tingling in right thumb (posterior aspect and in thenar eminence).  Reports least numbness in morning, then most numbness in evening after working all day.  Patient is a  and wishes to return to work at full capacity.  Patient is currently working, however is not using RUE.      Pain  No pain reported              Objective       Neurological Testing     Additional Neurological Details  Tingling sensation noted in posterior right thumb and thenar eminence.     Passive Range of Motion     Left Elbow   Normal passive range of motion    Right Elbow   Flexion: 140 degrees   Extension: 60 degrees     Additional Passive Range of Motion Details  Unable to assess strength or AROM this visit due to precautions         Assessment & Plan     Assessment  Impairments: abnormal or restricted ROM, impaired physical strength and lacks appropriate home exercise program  Assessment details: Patient presents to physical therapy s/p right distal biceps repair.  Patient demonstrates PROM within allowable range at this time without " pain.  Patient is appropriate to continue PT intervention in order to progress per MD protocol.    Prognosis: good  Functional Limitations: carrying objects, lifting, pulling and pushing  Goals  Plan Goals: In four weeks, patient will demonstrate full extension PROM in right elbow.   In four weeks, patient will report no pain in right UE with PROM.     In eight weeks, patient will demonstrate full AROM in R elbow.   In eight weeks, patient will demonstrate decreased perceived disability by decreasing score on QuickDASH by at least 25%.     Plan  Therapy options: will be seen for skilled physical therapy services  Planned modality interventions: cryotherapy, TENS and thermotherapy (hydrocollator packs)  Planned therapy interventions: manual therapy, neuromuscular re-education, soft tissue mobilization, strengthening, stretching, therapeutic activities, joint mobilization, home exercise program and flexibility  Frequency: 3x week  Plan details: 30 visits or 90 day recertification period.         Manual Therapy:    10     mins  19140;  Therapeutic Exercise:    15     mins  74430;     Neuromuscular Altagracia:        mins  18925;    Therapeutic Activity:          mins  34591;     Gait Training:           mins  09053;     Ultrasound:          mins  33173;    Electrical Stimulation:         mins  06757 ( );  Dry Needling          mins self-pay    Timed Treatment:   25   mins   Total Treatment:     55   mins    PT SIGNATURE: Noel Mclean PT   DATE TREATMENT INITIATED: 2/17/2020    Initial Certification  Certification Period: 5/17/2020  I certify that the therapy services are furnished while this patient is under my care.  The services outlined above are required by this patient, and will be reviewed every 90 days.     PHYSICIAN: Richard Berger MD      DATE:     Please sign and return via fax to [phone].. Thank you, [facility].

## 2020-02-18 ENCOUNTER — TREATMENT (OUTPATIENT)
Dept: PHYSICAL THERAPY | Facility: CLINIC | Age: 40
End: 2020-02-18

## 2020-02-18 DIAGNOSIS — S46.211D RUPTURE OF DISTAL BICEPS TENDON, RIGHT, SUBSEQUENT ENCOUNTER: Primary | ICD-10-CM

## 2020-02-18 PROCEDURE — 97140 MANUAL THERAPY 1/> REGIONS: CPT | Performed by: PHYSICAL THERAPIST

## 2020-02-18 NOTE — PROGRESS NOTES
Physical Therapy Daily Progress Note      Patient: Scar Velásquez   : 1980  Diagnosis/ICD-10 Code:  No primary diagnosis found.  Referring practitioner: Richard Berger, *  Date of Initial Visit: No linked episodes  Today's Date: 2020  Patient seen for Visit count could not be calculated. Make sure you are using a visit which is associated with an episode. sessions             Subjective Main thing pt has to report is a twinge in his tricep region.  No bicep pain to report.    Objective   See Exercise, Manual, and Modality Logs for complete treatment.       Assessment/Plan  No c/o pain with PROM to his limit of 60 degrees from full extension and elbow flexion as tolerated.    Progress per Plan of Care.  PROM only to 60 degrees from full extension.           Timed:         Manual Therapy:    25     mins  64587;     Therapeutic Exercise:         mins  95506;     Neuromuscular Altagracia:        mins  84322;    Therapeutic Activity:          mins  56224;     Gait Training:           mins  08415;     Ultrasound:          mins  29836;    Ionto                                   mins   87612  Self Care                            mins   77621      Un-Timed:  Electrical Stimulation:         mins  61475 ( );  Dry Needling          mins self-pay  Traction          mins 71686      Timed Treatment:   25   mins   Total Treatment:     25   mins    Vern Arriaza PTA  Physical Therapist Assistant

## 2020-02-19 ENCOUNTER — TREATMENT (OUTPATIENT)
Dept: PHYSICAL THERAPY | Facility: CLINIC | Age: 40
End: 2020-02-19

## 2020-02-19 DIAGNOSIS — S46.211D RUPTURE OF DISTAL BICEPS TENDON, RIGHT, SUBSEQUENT ENCOUNTER: Primary | ICD-10-CM

## 2020-02-19 PROCEDURE — 97140 MANUAL THERAPY 1/> REGIONS: CPT | Performed by: PHYSICAL THERAPIST

## 2020-02-19 NOTE — PROGRESS NOTES
Physical Therapy Daily Progress Note    Patient: Scar Velásquez   : 1980  Diagnosis/ICD-10 Code:  Rupture of distal biceps tendon, right, subsequent encounter [S46.211D]  Referring practitioner: Richard Berger, *  Date of Initial Visit: Type: THERAPY  Noted: 2020  Today's Date: 2020  Patient seen for 3 sessions         Scar Panlps reports:   Right UE feeling well.  No new complaints.     Objective   See Exercise, Manual, and Modality Logs for complete treatment.       Assessment/Plan    Full flexion PROM in left elbow, as well as extension to 60 degrees tolerated well without pain or tingling.  Progressing well.   Progress per Plan of Care           Manual Therapy:    30     mins  62710;  Therapeutic Exercise:         mins  83569;     Neuromuscular Altagracia:        mins  37662;    Therapeutic Activity:          mins  77697;     Gait Training:           mins  40280;     Ultrasound:          mins  45371;    Electrical Stimulation:         mins  13655 (MC );  Dry Needling          mins self-pay    Timed Treatment:   30   mins   Total Treatment:     30   mins    Noel Mclean PT  Physical Therapist

## 2020-02-24 ENCOUNTER — TREATMENT (OUTPATIENT)
Dept: PHYSICAL THERAPY | Facility: CLINIC | Age: 40
End: 2020-02-24

## 2020-02-24 PROCEDURE — 97140 MANUAL THERAPY 1/> REGIONS: CPT | Performed by: PHYSICAL THERAPIST

## 2020-02-24 NOTE — PROGRESS NOTES
Physical Therapy Daily Progress Note      Patient: Scar Velásquez   : 1980  Diagnosis/ICD-10 Code:  No primary diagnosis found.  Referring practitioner: Richard Berger, *  Date of Initial Visit: Type: THERAPY  Noted: 2020  Today's Date: 2020  Patient seen for 4 sessions         Scar Velásquez reports:  Right elbow/UE feeling well today.  No new complaints.      Objective   See Exercise, Manual, and Modality Logs for complete treatment.       Assessment/Plan    Tolerates progression of passive elbow extension to 45 degrees well.  Also demonstrates mild restriction of right forearm pronation/supination at end range.  Progressing well.   Progress per Plan of Care           Manual Therapy:    30     mins  13767;  Therapeutic Exercise:         mins  75501;     Neuromuscular Altagracia:        mins  15819;    Therapeutic Activity:          mins  61225;     Gait Training:           mins  64853;     Ultrasound:          mins  69896;    Electrical Stimulation:         mins  63987 ( );  Dry Needling          mins self-pay    Timed Treatment:   30   mins   Total Treatment:     30   mins    Noel Mclean PT  Physical Therapist

## 2020-02-25 ENCOUNTER — TREATMENT (OUTPATIENT)
Dept: PHYSICAL THERAPY | Facility: CLINIC | Age: 40
End: 2020-02-25

## 2020-02-25 DIAGNOSIS — S46.211D RUPTURE OF DISTAL BICEPS TENDON, RIGHT, SUBSEQUENT ENCOUNTER: Primary | ICD-10-CM

## 2020-02-25 PROCEDURE — 97140 MANUAL THERAPY 1/> REGIONS: CPT | Performed by: PHYSICAL THERAPIST

## 2020-02-25 NOTE — PROGRESS NOTES
Physical Therapy Daily Progress Note  Visit: 5    Scar Velásquez reports: no new issues - doing well with HEP    Subjective       Objective   See Exercise, Manual, and Modality Logs for complete treatment.       Assessment/Plan     PROM to current limit R elbow/forearm - pain free    Plan:    Continue current         Timed:         Manual Therapy:    25     mins  01206;           Timed Treatment:   25   mins   Total Treatment:     25   mins  Maykel Sebastian, PT, DPT  Physical Therapist

## 2020-02-26 ENCOUNTER — TREATMENT (OUTPATIENT)
Dept: PHYSICAL THERAPY | Facility: CLINIC | Age: 40
End: 2020-02-26

## 2020-02-26 DIAGNOSIS — S46.211D RUPTURE OF DISTAL BICEPS TENDON, RIGHT, SUBSEQUENT ENCOUNTER: Primary | ICD-10-CM

## 2020-02-26 PROCEDURE — 97140 MANUAL THERAPY 1/> REGIONS: CPT | Performed by: PHYSICAL THERAPIST

## 2020-02-26 NOTE — PROGRESS NOTES
Physical Therapy Daily Progress Note      Patient: Scar Velásquez   : 1980  Diagnosis/ICD-10 Code:  Rupture of distal biceps tendon, right, subsequent encounter [S46.211D]  Referring practitioner: Richard Berger, *  Date of Initial Visit: Type: THERAPY  Noted: 2020  Today's Date: 2020  Patient seen for 6 sessions             Subjective Pt is not having anything new to report today.    Objective   See Exercise, Manual, and Modality Logs for complete treatment.       Assessment/Plan  Good tolerance with PROM to pt's limit of 45 degrees as per protocol.  Continue with PROM per protocol until cleared by MD to begin anything else.    Progress per Plan of Care           Timed:         Manual Therapy:    25     mins  09032;     Therapeutic Exercise:         mins  95616;     Neuromuscular Altagracia:        mins  73054;    Therapeutic Activity:          mins  79540;     Gait Training:           mins  61950;     Ultrasound:          mins  80889;    Ionto                                   mins   38049  Self Care                            mins   36999      Un-Timed:  Electrical Stimulation:         mins  85695 ( );  Dry Needling          mins self-pay  Traction          mins 64826      Timed Treatment:   25   mins   Total Treatment:     25   mins    Vern Arriaza PTA  Physical Therapist Assistant

## 2020-03-02 ENCOUNTER — TREATMENT (OUTPATIENT)
Dept: PHYSICAL THERAPY | Facility: CLINIC | Age: 40
End: 2020-03-02

## 2020-03-02 DIAGNOSIS — S46.211D RUPTURE OF DISTAL BICEPS TENDON, RIGHT, SUBSEQUENT ENCOUNTER: Primary | ICD-10-CM

## 2020-03-02 PROCEDURE — 97140 MANUAL THERAPY 1/> REGIONS: CPT | Performed by: PHYSICAL THERAPIST

## 2020-03-02 NOTE — PROGRESS NOTES
Physical Therapy Daily Progress Note    Patient: Scar Velásquez   : 1980  Diagnosis/ICD-10 Code:  Rupture of distal biceps tendon, right, subsequent encounter [S46.211D]  Referring practitioner: Richard Berger, *  Date of Initial Visit: Type: THERAPY  Noted: 2020  Today's Date: 3/2/2020  Patient seen for 7 sessions         Scar Velásquez reports:  Feeling well.  No complaints of pain since previous visit.     Objective   See Exercise, Manual, and Modality Logs for complete treatment.       Assessment/Plan     Tolerates progression of right elbow passive extension to 30 degrees.  Limited supination noted in right forearm, addressed with manual therapy.  Progressing well.     Progress per Plan of Care           Manual Therapy:    25     mins  03774;  Therapeutic Exercise:       mins  55502;     Neuromuscular Altagracia:        mins  87438;    Therapeutic Activity:          mins  81455;     Gait Training:           mins  89846;     Ultrasound:          mins  16986;    Electrical Stimulation:         mins  28998 ( );  Dry Needling          mins self-pay    Timed Treatment:   25   mins   Total Treatment:     25   mins    Noel Mclean PT  Physical Therapist

## 2020-03-03 ENCOUNTER — TELEPHONE (OUTPATIENT)
Dept: ORTHOPEDIC SURGERY | Facility: CLINIC | Age: 40
End: 2020-03-03

## 2020-03-03 NOTE — TELEPHONE ENCOUNTER
Patient's physical therapist called and an is wanting to know if yony is allowed to un lock his brace

## 2020-03-04 ENCOUNTER — TREATMENT (OUTPATIENT)
Dept: PHYSICAL THERAPY | Facility: CLINIC | Age: 40
End: 2020-03-04

## 2020-03-04 DIAGNOSIS — S46.211D RUPTURE OF DISTAL BICEPS TENDON, RIGHT, SUBSEQUENT ENCOUNTER: Primary | ICD-10-CM

## 2020-03-04 PROCEDURE — 97140 MANUAL THERAPY 1/> REGIONS: CPT | Performed by: PHYSICAL THERAPIST

## 2020-03-04 NOTE — TELEPHONE ENCOUNTER
At home unless he is doing stretching no. He can unlock and or remove for PT and stretching at home within the prescribed motion limits

## 2020-03-04 NOTE — PROGRESS NOTES
Physical Therapy Daily Progress Note    Patient: Scar Velásquez   : 1980  Diagnosis/ICD-10 Code:  Rupture of distal biceps tendon, right, subsequent encounter [S46.211D]  Referring practitioner: Richard Berger, *  Date of Initial Visit: Type: THERAPY  Noted: 2020  Today's Date: 3/4/2020  Patient seen for 8 sessions         Scar Velásquez reports:  Right arm feeling well.  No pain with HEP or after previous treatment session.     Objective   See Exercise, Manual, and Modality Logs for complete treatment.       Assessment/Plan     Tolerates PROM well again this visit.  Patient demonstrates proper technique and reports no pain with AROM.     Progress per Plan of Care           Manual Therapy:   25    mins  66347;  Therapeutic Exercise:    5     mins  26481;     Neuromuscular Altagracia:        mins  76555;    Therapeutic Activity:          mins  55074;     Gait Training:           mins  93139;     Ultrasound:          mins  55752;    Electrical Stimulation:         mins  92632 ( );  Dry Needling          mins self-pay    Timed Treatment:   30   mins   Total Treatment:     30   mins    Noel Mclean PT  Physical Therapist

## 2020-03-05 ENCOUNTER — TREATMENT (OUTPATIENT)
Dept: PHYSICAL THERAPY | Facility: CLINIC | Age: 40
End: 2020-03-05

## 2020-03-05 DIAGNOSIS — S46.211D RUPTURE OF DISTAL BICEPS TENDON, RIGHT, SUBSEQUENT ENCOUNTER: Primary | ICD-10-CM

## 2020-03-05 PROCEDURE — 97140 MANUAL THERAPY 1/> REGIONS: CPT | Performed by: PHYSICAL THERAPIST

## 2020-03-05 NOTE — PROGRESS NOTES
Physical Therapy Daily Progress Note    Patient: Scar Velásquez   : 1980  Diagnosis/ICD-10 Code:  Rupture of distal biceps tendon, right, subsequent encounter [S46.211D]  Referring practitioner: Richard Berger, *  Date of Initial Visit: Type: THERAPY  Noted: 2020  Today's Date: 3/5/2020  Patient seen for 9 sessions         Scar Velásquez reports:  Right arm feeling well.  No new complaints this visit.  Reports compliance with HEP.    Objective   See Exercise, Manual, and Modality Logs for complete treatment.       Assessment/Plan     Tolerates today's treatment well.  Patient demonstrates PROM to 30 degrees from full extension with reports of mild stretching, however no pain.  Progressing well.  Follows up with MD next week.    Progress per Plan of Care           Manual Therapy:    25     mins  77009;  Therapeutic Exercise:    5     mins  86238;     Neuromuscular Altagracia:        mins  93449;    Therapeutic Activity:          mins  37855;     Gait Training:           mins  98203;     Ultrasound:          mins  29738;    Electrical Stimulation:         mins  96096 ( );  Dry Needling         mins self-pay    Timed Treatment:   30   mins   Total Treatment:     30   mins    Noel Mclean PT  Physical Therapist

## 2020-03-10 ENCOUNTER — TREATMENT (OUTPATIENT)
Dept: PHYSICAL THERAPY | Facility: CLINIC | Age: 40
End: 2020-03-10

## 2020-03-10 PROCEDURE — 97140 MANUAL THERAPY 1/> REGIONS: CPT | Performed by: PHYSICAL THERAPIST

## 2020-03-10 NOTE — PROGRESS NOTES
Physical Therapy Daily Progress Note    Patient: Scar Velásquez   : 1980  Diagnosis/ICD-10 Code:  No primary diagnosis found.  Referring practitioner: Richard Berger, *  Date of Initial Visit: No linked episodes  Today's Date: 3/10/2020  Patient seen for Visit count could not be calculated. Make sure you are using a visit which is associated with an episode. sessions         Scar Velásquez reports: Feeling well today.  No new complaints.     Objective   See Exercise, Manual, and Modality Logs for complete treatment.       Assessment/Plan    Demonstrates tolerating PROM well.  Patient limited to 20 degrees from full extension while reporting tightness in posterior right elbow.  Patient reports no pain in left bicep.  Will follow-up with MD tomorrow.   Progress per Plan of Care           Manual Therapy:    25     mins  21883;  Therapeutic Exercise:         mins  32776;     Neuromuscular Altagracia:        mins  76625;    Therapeutic Activity:          mins  37838;     Gait Training:           mins  17864;     Ultrasound:          mins  11684;    Electrical Stimulation:         mins  80807 ( );  Dry Needling          mins self-pay    Timed Treatment:   25   mins   Total Treatment:     25   mins    Noel Mclean PT  Physical Therapist

## 2020-03-11 ENCOUNTER — TREATMENT (OUTPATIENT)
Dept: PHYSICAL THERAPY | Facility: CLINIC | Age: 40
End: 2020-03-11

## 2020-03-11 DIAGNOSIS — S46.211D RUPTURE OF DISTAL BICEPS TENDON, RIGHT, SUBSEQUENT ENCOUNTER: Primary | ICD-10-CM

## 2020-03-11 PROCEDURE — 97140 MANUAL THERAPY 1/> REGIONS: CPT | Performed by: PHYSICAL THERAPIST

## 2020-03-12 ENCOUNTER — OFFICE VISIT (OUTPATIENT)
Dept: ORTHOPEDIC SURGERY | Facility: CLINIC | Age: 40
End: 2020-03-12

## 2020-03-12 ENCOUNTER — TREATMENT (OUTPATIENT)
Dept: PHYSICAL THERAPY | Facility: CLINIC | Age: 40
End: 2020-03-12

## 2020-03-12 VITALS
DIASTOLIC BLOOD PRESSURE: 89 MMHG | SYSTOLIC BLOOD PRESSURE: 143 MMHG | HEART RATE: 80 BPM | BODY MASS INDEX: 40.95 KG/M2 | WEIGHT: 309 LBS | HEIGHT: 73 IN

## 2020-03-12 DIAGNOSIS — Z47.89 ORTHOPEDIC AFTERCARE: Primary | ICD-10-CM

## 2020-03-12 DIAGNOSIS — S46.211D RUPTURE OF DISTAL BICEPS TENDON, RIGHT, SUBSEQUENT ENCOUNTER: Primary | ICD-10-CM

## 2020-03-12 PROCEDURE — 97110 THERAPEUTIC EXERCISES: CPT | Performed by: PHYSICAL THERAPIST

## 2020-03-12 PROCEDURE — 99024 POSTOP FOLLOW-UP VISIT: CPT | Performed by: ORTHOPAEDIC SURGERY

## 2020-03-12 NOTE — PROGRESS NOTES
Physical Therapy Daily Progress Note    Patient: Scar Velásquez   : 1980  Diagnosis/ICD-10 Code:  Rupture of distal biceps tendon, right, subsequent encounter [S46.211D]  Referring practitioner: Richard Berger, *  Date of Initial Visit: Type: THERAPY  Noted: 2020  Today's Date: 3/12/2020  Patient seen for 10 sessions         Scar Velásquez reports:  Feeling well today.  No new complaints.  Will see MD tomorrow.     Objective   See Exercise, Manual, and Modality Logs for complete treatment.     Goals:  In four weeks, patient will demonstrate full extension PROM in right elbow. NM  In four weeks, patient will report no pain in right UE with PROM. met    In eight weeks, patient will demonstrate full AROM in R elbow.   In eight weeks, patient will demonstrate decreased perceived disability by decreasing score on QuickDASH by at least 25%.     Assessment/Plan     Tolerates PROM and AROM well this treatment.  Will progress when advised by MD after appointment this week.     Progress per Plan of Care           Manual Therapy:    20     mins  40543;  Therapeutic Exercise:    5     mins  80465;     Neuromuscular Altagracia:        mins  34182;    Therapeutic Activity:          mins  30781;     Gait Training:           mins  39611;     Ultrasound:          mins  27804;    Electrical Stimulation:         mins  50236 ( );  Dry Needling          mins self-pay    Timed Treatment:   25   mins   Total Treatment:     25   mins    Noel Mclean PT  Physical Therapist

## 2020-03-12 NOTE — PROGRESS NOTES
"     Patient ID: Scar Velásquez is a 39 y.o. male.  2/4/20 right distal bicep repair pain minimal      Objective:    /89   Pulse 80   Ht 185.4 cm (73\")   Wt (!) 140 kg (309 lb)   BMI 40.77 kg/m²     Physical Examination:  Incision is healed.  Range of motion is 5 to 130 degrees with full pronation and supination.  Tingling has nearly resolved over the lateral aspect of the forearm.  Repair is intact.      Imaging:      Assessment:  Doing well after right distal bicep repair    Plan:  Discontinue brace, continue range of motion and begin light strengthening in 2 weeks.  Modified duty at work and see me in a month  "

## 2020-03-12 NOTE — PROGRESS NOTES
Physical Therapy Daily Progress Note      Patient: Scar Velásquez   : 1980  Diagnosis/ICD-10 Code:  Rupture of distal biceps tendon, right, subsequent encounter [S46.211D]  Referring practitioner: Richard Berger, *  Date of Initial Visit: Type: THERAPY  Noted: 2020  Today's Date: 3/12/2020  Patient seen for 11 sessions         Scar Velásquez reports:  Met with MD and reports that he can d/c use of brace.  Patient can now use RUE at work in limited fashion.      Objective   See Exercise, Manual, and Modality Logs for complete treatment.       Assessment/Plan     Patient educated on resisted exercise and will begin strengthening in two weeks.  Patient compliant with I HEP    Anticipate DC next Visit           Manual Therapy:    5     mins  59480;  Therapeutic Exercise:    10     mins  70494;     Neuromuscular Altagracia:        mins  65904;    Therapeutic Activity:          mins  21876;     Gait Training:           mins  10225;     Ultrasound:          mins  16956;    Electrical Stimulation:         mins  52384 ( );  Dry Needling          mins self-pay    Timed Treatment:   15   mins   Total Treatment:     15   mins    Noel Mclean PT  Physical Therapist

## 2020-03-20 RX ORDER — NAPROXEN 500 MG/1
TABLET ORAL
Qty: 60 TABLET | Refills: 1 | Status: SHIPPED | OUTPATIENT
Start: 2020-03-20 | End: 2020-12-08

## 2020-03-27 DIAGNOSIS — E66.01 CLASS 2 SEVERE OBESITY DUE TO EXCESS CALORIES WITH SERIOUS COMORBIDITY AND BODY MASS INDEX (BMI) OF 38.0 TO 38.9 IN ADULT (HCC): ICD-10-CM

## 2020-03-27 RX ORDER — PHENTERMINE HYDROCHLORIDE 30 MG/1
30 CAPSULE ORAL DAILY
Qty: 30 CAPSULE | Refills: 3 | Status: SHIPPED | OUTPATIENT
Start: 2020-03-27 | End: 2021-02-11

## 2020-03-27 NOTE — TELEPHONE ENCOUNTER
Pt called to start his 3 month cycle of phentermine 30 MG capsule and it needs to be sent to [id number] - [place], [place] - [address] AT [address] - [phone]  - [phone] FX     Pt call back   [phone]

## 2020-04-15 ENCOUNTER — OFFICE VISIT (OUTPATIENT)
Dept: ORTHOPEDIC SURGERY | Facility: CLINIC | Age: 40
End: 2020-04-15

## 2020-04-15 VITALS
DIASTOLIC BLOOD PRESSURE: 93 MMHG | TEMPERATURE: 98.2 F | SYSTOLIC BLOOD PRESSURE: 137 MMHG | WEIGHT: 308 LBS | HEART RATE: 76 BPM | BODY MASS INDEX: 40.82 KG/M2 | HEIGHT: 73 IN

## 2020-04-15 DIAGNOSIS — Z47.89 ORTHOPEDIC AFTERCARE: Primary | ICD-10-CM

## 2020-04-15 PROCEDURE — 99024 POSTOP FOLLOW-UP VISIT: CPT | Performed by: ORTHOPAEDIC SURGERY

## 2020-04-15 NOTE — PROGRESS NOTES
"     Patient ID: Scar Velásquez is a 39 y.o. male.  2/4/20 right distal bicep repair pain minimal      Objective:    /93   Pulse 76   Temp 98.2 °F (36.8 °C) (Oral)   Ht 185.4 cm (73\")   Wt (!) 140 kg (308 lb)   BMI 40.64 kg/m²     Physical Examination:  Incision is healed, range of motion 0 to 130 degrees with full pronation and supination and intact repair, mild tingling over the dorsal aspect of the thenar webspace      Imaging:      Assessment:  Doing well after distal bicep repair    Plan:  Begin strengthening, activity as tolerated in 2 weeks and see me as needed  "

## 2020-12-08 RX ORDER — NAPROXEN 500 MG/1
TABLET ORAL
Qty: 60 TABLET | Refills: 1 | Status: SHIPPED | OUTPATIENT
Start: 2020-12-08 | End: 2021-02-11

## 2021-02-11 ENCOUNTER — LAB (OUTPATIENT)
Dept: FAMILY MEDICINE CLINIC | Facility: CLINIC | Age: 41
End: 2021-02-11

## 2021-02-11 ENCOUNTER — OFFICE VISIT (OUTPATIENT)
Dept: FAMILY MEDICINE CLINIC | Facility: CLINIC | Age: 41
End: 2021-02-11

## 2021-02-11 DIAGNOSIS — Z11.59 NEED FOR HEPATITIS C SCREENING TEST: ICD-10-CM

## 2021-02-11 DIAGNOSIS — R03.0 ELEVATED BLOOD PRESSURE READING: ICD-10-CM

## 2021-02-11 DIAGNOSIS — Z00.00 PHYSICAL EXAM: ICD-10-CM

## 2021-02-11 DIAGNOSIS — Z12.5 PROSTATE CANCER SCREENING: ICD-10-CM

## 2021-02-11 DIAGNOSIS — E66.01 CLASS 3 SEVERE OBESITY DUE TO EXCESS CALORIES WITH SERIOUS COMORBIDITY AND BODY MASS INDEX (BMI) OF 40.0 TO 44.9 IN ADULT (HCC): ICD-10-CM

## 2021-02-11 DIAGNOSIS — Z00.00 PHYSICAL EXAM: Primary | ICD-10-CM

## 2021-02-11 LAB
ALBUMIN SERPL-MCNC: 4.4 G/DL (ref 3.5–5.2)
ALBUMIN/GLOB SERPL: 1.7 G/DL
ALP SERPL-CCNC: 66 U/L (ref 39–117)
ALT SERPL W P-5'-P-CCNC: 19 U/L (ref 1–41)
ANION GAP SERPL CALCULATED.3IONS-SCNC: 10.8 MMOL/L (ref 5–15)
AST SERPL-CCNC: 17 U/L (ref 1–40)
BASOPHILS # BLD AUTO: 0.09 10*3/MM3 (ref 0–0.2)
BASOPHILS NFR BLD AUTO: 0.9 % (ref 0–1.5)
BILIRUB SERPL-MCNC: 0.3 MG/DL (ref 0–1.2)
BILIRUB UR QL STRIP: NEGATIVE
BUN SERPL-MCNC: 13 MG/DL (ref 6–20)
BUN/CREAT SERPL: 10.7 (ref 7–25)
CALCIUM SPEC-SCNC: 9.7 MG/DL (ref 8.6–10.5)
CHLORIDE SERPL-SCNC: 101 MMOL/L (ref 98–107)
CHOLEST SERPL-MCNC: 198 MG/DL (ref 0–200)
CLARITY UR: CLEAR
CO2 SERPL-SCNC: 26.2 MMOL/L (ref 22–29)
COLOR UR: YELLOW
CREAT SERPL-MCNC: 1.21 MG/DL (ref 0.76–1.27)
DEPRECATED RDW RBC AUTO: 38.5 FL (ref 37–54)
EOSINOPHIL # BLD AUTO: 0.16 10*3/MM3 (ref 0–0.4)
EOSINOPHIL NFR BLD AUTO: 1.6 % (ref 0.3–6.2)
ERYTHROCYTE [DISTWIDTH] IN BLOOD BY AUTOMATED COUNT: 12.6 % (ref 12.3–15.4)
GFR SERPL CREATININE-BSD FRML MDRD: 66 ML/MIN/1.73
GLOBULIN UR ELPH-MCNC: 2.6 GM/DL
GLUCOSE SERPL-MCNC: 77 MG/DL (ref 65–99)
GLUCOSE UR STRIP-MCNC: NEGATIVE MG/DL
HCT VFR BLD AUTO: 42.7 % (ref 37.5–51)
HDLC SERPL-MCNC: 29 MG/DL (ref 40–60)
HGB BLD-MCNC: 14.7 G/DL (ref 13–17.7)
HGB UR QL STRIP.AUTO: NEGATIVE
IMM GRANULOCYTES # BLD AUTO: 0.05 10*3/MM3 (ref 0–0.05)
IMM GRANULOCYTES NFR BLD AUTO: 0.5 % (ref 0–0.5)
KETONES UR QL STRIP: NEGATIVE
LDLC SERPL CALC-MCNC: 125 MG/DL (ref 0–100)
LDLC/HDLC SERPL: 4.14 {RATIO}
LEUKOCYTE ESTERASE UR QL STRIP.AUTO: NEGATIVE
LYMPHOCYTES # BLD AUTO: 2.43 10*3/MM3 (ref 0.7–3.1)
LYMPHOCYTES NFR BLD AUTO: 24 % (ref 19.6–45.3)
MCH RBC QN AUTO: 28.8 PG (ref 26.6–33)
MCHC RBC AUTO-ENTMCNC: 34.4 G/DL (ref 31.5–35.7)
MCV RBC AUTO: 83.7 FL (ref 79–97)
MONOCYTES # BLD AUTO: 0.64 10*3/MM3 (ref 0.1–0.9)
MONOCYTES NFR BLD AUTO: 6.3 % (ref 5–12)
NEUTROPHILS NFR BLD AUTO: 6.75 10*3/MM3 (ref 1.7–7)
NEUTROPHILS NFR BLD AUTO: 66.7 % (ref 42.7–76)
NITRITE UR QL STRIP: NEGATIVE
NRBC BLD AUTO-RTO: 0 /100 WBC (ref 0–0.2)
PH UR STRIP.AUTO: 6.5 [PH] (ref 5–8)
PLATELET # BLD AUTO: 269 10*3/MM3 (ref 140–450)
PMV BLD AUTO: 10.9 FL (ref 6–12)
POTASSIUM SERPL-SCNC: 3.9 MMOL/L (ref 3.5–5.2)
PROT SERPL-MCNC: 7 G/DL (ref 6–8.5)
PROT UR QL STRIP: NEGATIVE
PSA SERPL-MCNC: 1.11 NG/ML (ref 0–4)
RBC # BLD AUTO: 5.1 10*6/MM3 (ref 4.14–5.8)
SODIUM SERPL-SCNC: 138 MMOL/L (ref 136–145)
SP GR UR STRIP: 1.02 (ref 1–1.03)
TRIGL SERPL-MCNC: 244 MG/DL (ref 0–150)
TSH SERPL DL<=0.05 MIU/L-ACNC: 2.29 UIU/ML (ref 0.27–4.2)
UROBILINOGEN UR QL STRIP: NORMAL
VLDLC SERPL-MCNC: 44 MG/DL (ref 5–40)
WBC # BLD AUTO: 10.12 10*3/MM3 (ref 3.4–10.8)

## 2021-02-11 PROCEDURE — 80061 LIPID PANEL: CPT | Performed by: FAMILY MEDICINE

## 2021-02-11 PROCEDURE — 81003 URINALYSIS AUTO W/O SCOPE: CPT | Performed by: FAMILY MEDICINE

## 2021-02-11 PROCEDURE — 86803 HEPATITIS C AB TEST: CPT | Performed by: FAMILY MEDICINE

## 2021-02-11 PROCEDURE — 99396 PREV VISIT EST AGE 40-64: CPT | Performed by: FAMILY MEDICINE

## 2021-02-11 PROCEDURE — 84443 ASSAY THYROID STIM HORMONE: CPT | Performed by: FAMILY MEDICINE

## 2021-02-11 PROCEDURE — G0103 PSA SCREENING: HCPCS | Performed by: FAMILY MEDICINE

## 2021-02-11 PROCEDURE — 85025 COMPLETE CBC W/AUTO DIFF WBC: CPT | Performed by: FAMILY MEDICINE

## 2021-02-11 PROCEDURE — 80053 COMPREHEN METABOLIC PANEL: CPT | Performed by: FAMILY MEDICINE

## 2021-02-11 PROCEDURE — 36415 COLL VENOUS BLD VENIPUNCTURE: CPT

## 2021-02-11 RX ORDER — PHENTERMINE HYDROCHLORIDE 37.5 MG/1
37.5 CAPSULE ORAL EVERY MORNING
Qty: 30 CAPSULE | Refills: 5 | Status: SHIPPED | OUTPATIENT
Start: 2021-02-11 | End: 2022-06-28

## 2021-02-11 RX ORDER — NAPROXEN 500 MG/1
TABLET ORAL
Qty: 60 TABLET | Refills: 11 | Status: SHIPPED | OUTPATIENT
Start: 2021-02-11 | End: 2022-06-28

## 2021-02-11 NOTE — PROGRESS NOTES
Rooming Tab(CC,VS,Pt Hx,Fall Screen)  Chief Complaint   Patient presents with   • Annual Exam       Subjective 40-year-old here for his physical.  Patient exercises 3 times a week and he walks as well.  He walks at least 5 miles a day.  Despite this he continues to be obese.  He has difficulty losing weight.  He states he does not eat that much and he eats a nutritious diet and has been doing a fasting diet.  He denies any chest pain or dizziness or syncope with exertion.  He states his blood pressure outside the office is normal, 120  to 130 systolic, 85 diastolic.  He denies any issues with his bowel or bladder function.  Is no blood in stool or urine.    I have reviewed and updated his medications, medical history and problem list during today's office visit.     Patient Care Team:  Duke Faria MD as PCP - General (Family Medicine)    Problem List Tab  Medications Tab  Synopsis Tab  Chart Review Tab  Care Everywhere Tab  Immunizations Tab  Patient History Tab    Social History     Tobacco Use   • Smoking status: Current Every Day Smoker     Types: Electronic Cigarette     Last attempt to quit: 2014     Years since quittin.1   • Smokeless tobacco: Never Used   • Tobacco comment: 10-15 times per day   Substance Use Topics   • Alcohol use: Yes     Frequency: Never     Drinks per session: 1 or 2     Binge frequency: Less than monthly     Comment: rare  2 times per year       Review of Systems   Constitutional: Negative for chills, fatigue and fever.   HENT: Negative for nosebleeds.    Eyes: Negative for blurred vision and double vision.   Respiratory: Negative for chest tightness and shortness of breath.    Cardiovascular: Negative for chest pain and palpitations.   Gastrointestinal: Negative for abdominal pain and blood in stool.   Endocrine: Negative for polydipsia and polyuria.   Genitourinary: Negative for dysuria and hematuria.   Musculoskeletal: Negative for back pain and gait problem.   Neurological:  Negative for dizziness and syncope.   Psychiatric/Behavioral: Negative for self-injury and depressed mood.       Objective     Rooming Tab(CC,VS,Pt Hx,Fall Screen)  /90   Pulse 76   Temp 96.8 °F (36 °C)   Resp 16   Wt (!) 144 kg (317 lb)   BMI 41.82 kg/m²     Body mass index is 41.82 kg/m².    Physical Exam  Vitals signs and nursing note reviewed.   Constitutional:       General: He is not in acute distress.     Appearance: Normal appearance. He is well-developed. He is obese.      Comments: Obese pleasant male in no acute distress   HENT:      Head: Normocephalic and atraumatic.      Right Ear: Tympanic membrane and ear canal normal.      Left Ear: Tympanic membrane and ear canal normal.      Nose: Nose normal.      Mouth/Throat:      Mouth: Mucous membranes are moist.      Pharynx: Oropharynx is clear.   Eyes:      General: Lids are normal.      Extraocular Movements: Extraocular movements intact.      Conjunctiva/sclera: Conjunctivae normal.      Pupils: Pupils are equal, round, and reactive to light.   Neck:      Musculoskeletal: Normal range of motion and neck supple.      Thyroid: No thyroid mass or thyromegaly.      Vascular: No carotid bruit or JVD.      Trachea: Trachea normal.      Comments: No carotid bruits  Cardiovascular:      Rate and Rhythm: Normal rate and regular rhythm.      Pulses: Normal pulses.      Heart sounds: Normal heart sounds.   Pulmonary:      Effort: Pulmonary effort is normal.      Breath sounds: Normal breath sounds.   Abdominal:      General: Bowel sounds are normal.      Palpations: Abdomen is soft.      Comments: Obese   Genitourinary:     Prostate: Normal.      Rectum: Normal.   Musculoskeletal:      Comments: No c/c/e   Skin:     General: Skin is warm and dry.   Neurological:      General: No focal deficit present.      Mental Status: He is alert and oriented to person, place, and time.      Cranial Nerves: No cranial nerve deficit.   Psychiatric:         Attention  and Perception: He is attentive.         Mood and Affect: Mood normal.         Speech: Speech normal.         Behavior: Behavior normal.         Thought Content: Thought content normal.         Judgment: Judgment normal.          Statin Choice Calculator  Data Reviewed:               Lab Results   Component Value Date    BUN 13 02/11/2021    CREATININE 1.21 02/11/2021    EGFRIFNONA 66 02/11/2021     02/11/2021    K 3.9 02/11/2021     02/11/2021    CALCIUM 9.7 02/11/2021    ALBUMIN 4.40 02/11/2021    BILITOT 0.3 02/11/2021    ALKPHOS 66 02/11/2021    AST 17 02/11/2021    ALT 19 02/11/2021    TRIG 244 (H) 02/11/2021    HDL 29 (L) 02/11/2021    VLDL 44 (H) 02/11/2021     (H) 02/11/2021    LDLHDL 4.14 02/11/2021    WBC 10.12 02/11/2021    RBC 5.10 02/11/2021    HCT 42.7 02/11/2021    MCV 83.7 02/11/2021    MCH 28.8 02/11/2021    TSH 2.290 02/11/2021    PSA 1.110 02/11/2021      Assessment/Plan   Order Review Tab  Health Maintenance Tab  Patient Plan/Order Tab  Diagnoses and all orders for this visit:    1. Physical exam (Primary)  Assessment & Plan:  I recommend the patient continue with his regular aerobic exercise for his cardiovascular health and to help lose weight.  I recommend a nutritious diet high in fiber and vegetable, lower carbohydrate, eating leaner cuts of meat high amounts fish.  Immunizations discussed with the patient.  He does need to lose weight, decreasing caloric intake would be helpful and continue to or may be even more aerobic exercise.  I recommend he stop smoking or vaping    Orders:  -     CBC & Differential  -     Comprehensive Metabolic Panel  -     Lipid Panel  -     TSH  -     Urinalysis With Culture If Indicated -; Future  -     CBC Auto Differential    2. Prostate cancer screening  -     PSA Screen    3. Need for hepatitis C screening test  -     Hepatitis C Antibody; Future    4. Elevated blood pressure reading  Assessment & Plan:  I recommend the patient take his  blood pressure outside the office, we will check it 3 times a week, get an Omron with a large adult cuff, bring in the cuff with his next visit.  He can follow-up in 3 months      5. Class 3 severe obesity due to excess calories with serious comorbidity and body mass index (BMI) of 40.0 to 44.9 in adult (CMS/[facility])  Assessment & Plan:  Patient's (Body mass index is 41.82 kg/m².) indicates that they are morbidly obese (BMI > 40 or > 35 with obesity - related health condition) with obesity-related health conditions that include hypertension . Obesity is unchanged. BMI is is above average; BMI management plan is completed. We discussed portion control and increasing exercise.  We will give phentermine 37.5 mg daily.  We will follow-up in 3 months with evaluation of blood pressure and weight loss.      Other orders  -     phentermine 37.5 MG capsule; Take 1 capsule by mouth Every Morning.  Dispense: 30 capsule; Refill: 5      Wrapup Tab  Return in about 3 months (around 5/11/2021) for Recheck.

## 2021-02-12 VITALS
WEIGHT: 315 LBS | DIASTOLIC BLOOD PRESSURE: 90 MMHG | TEMPERATURE: 96.8 F | RESPIRATION RATE: 16 BRPM | SYSTOLIC BLOOD PRESSURE: 140 MMHG | BODY MASS INDEX: 41.82 KG/M2 | HEART RATE: 76 BPM

## 2021-02-12 PROBLEM — I10 ESSENTIAL (PRIMARY) HYPERTENSION: Status: RESOLVED | Noted: 2017-10-11 | Resolved: 2021-02-12

## 2021-02-12 PROBLEM — R03.0 ELEVATED BLOOD PRESSURE READING: Status: ACTIVE | Noted: 2021-02-12

## 2021-02-12 LAB — HCV AB SER DONR QL: NORMAL

## 2021-02-12 NOTE — ASSESSMENT & PLAN NOTE
Patient's (Body mass index is 41.82 kg/m².) indicates that they are morbidly obese (BMI > 40 or > 35 with obesity - related health condition) with obesity-related health conditions that include hypertension . Obesity is unchanged. BMI is is above average; BMI management plan is completed. We discussed portion control and increasing exercise.  We will give phentermine 37.5 mg daily.  We will follow-up in 3 months with evaluation of blood pressure and weight loss.

## 2021-02-12 NOTE — ASSESSMENT & PLAN NOTE
I recommend the patient take his blood pressure outside the office, we will check it 3 times a week, get an Omron with a large adult cuff, bring in the cuff with his next visit.  He can follow-up in 3 months

## 2021-02-12 NOTE — ASSESSMENT & PLAN NOTE
I recommend the patient continue with his regular aerobic exercise for his cardiovascular health and to help lose weight.  I recommend a nutritious diet high in fiber and vegetable, lower carbohydrate, eating leaner cuts of meat high amounts fish.  Immunizations discussed with the patient.  He does need to lose weight, decreasing caloric intake would be helpful and continue to or may be even more aerobic exercise.  I recommend he stop smoking or vaping

## 2021-04-13 ENCOUNTER — TELEPHONE (OUTPATIENT)
Dept: FAMILY MEDICINE CLINIC | Facility: CLINIC | Age: 41
End: 2021-04-13

## 2021-04-13 NOTE — TELEPHONE ENCOUNTER
PATIENT HAD HIS DOT PHYSICAL DONE BUT NEEDS THE MEDICAL LETTER FOR MEDICATION FILLED OUT CAUSE HE IS TAKING PHENTERMINE THAT WAS PRESCRIBED BY DR MAYA THAT IT WILL NOT AFFECT HIS DRIVING. KARSON LAST SEEN DR MAYA ON 02/11/21 AND OFFICE NOTE IS ATTACHED.

## 2021-04-14 ENCOUNTER — TELEPHONE (OUTPATIENT)
Dept: FAMILY MEDICINE CLINIC | Facility: CLINIC | Age: 41
End: 2021-04-14

## 2021-04-14 NOTE — TELEPHONE ENCOUNTER
Medication form for [employer] was completed, faxed and mailed to patient. Patient notified and copy scanned into chart

## 2021-05-12 ENCOUNTER — TELEPHONE (OUTPATIENT)
Dept: FAMILY MEDICINE CLINIC | Facility: CLINIC | Age: 41
End: 2021-05-12

## 2021-05-12 NOTE — TELEPHONE ENCOUNTER
Caller: Scar Ruggiero    Relationship: Self    Best call back number: [phone]     What form or medical record are you requesting: LETTER     Who is requesting this form or medical record from you: MEDICAL DEPARTMENT FOR [facility]     How would you like to receive the form or medical records (pick-up, mail, fax):  FROM OFFICE    Timeframe paperwork needed: WITHIN 15 DAYS     Additional notes:     MR. RUGGIERO IS NEEDING ANOTHER LETTER STATING THE phentermine 37.5 MG capsule WOULD NOT AFFECT HIS DRIVING OR JOB IN ANY WAY , HE STATES THAT DR. MAYA PRESCRIBED  MEDICATION IN February 2021.     LETTER NEEDS TO STATE:     -CONDITION FOR WHICH MEDICATION IS PRESCRIBED    -CURRENT SYMPTOMS     -ANY SIGNIFICANT SIDE EFFECTS THAT MR. RUGGIERO MAY BE EXPERIENCING , WHICH HE STATES IS NONE     -CONTROL AND STABILITY OF CONDITION,     -IF HE HAS ANY WORK RESTRICTIONS WHILE HE IS TAKING MEDICATION.     HE WOULD LIKE TO KNOW IF HE IS NEEDING TO MAKE AN APPOINTMENT TO BE SEEN IN ORDER TO GET AN UPDATED LETTER, IF SO HE WILL NEED TO BE CONTACTED AS SOON AS POSSIBLE.

## 2021-05-21 ENCOUNTER — OFFICE VISIT (OUTPATIENT)
Dept: FAMILY MEDICINE CLINIC | Facility: CLINIC | Age: 41
End: 2021-05-21

## 2021-05-21 VITALS
BODY MASS INDEX: 41.75 KG/M2 | DIASTOLIC BLOOD PRESSURE: 88 MMHG | WEIGHT: 315 LBS | SYSTOLIC BLOOD PRESSURE: 136 MMHG | HEIGHT: 73 IN | HEART RATE: 85 BPM | OXYGEN SATURATION: 99 % | TEMPERATURE: 99 F

## 2021-05-21 DIAGNOSIS — E66.01 MORBID (SEVERE) OBESITY DUE TO EXCESS CALORIES (HCC): Primary | ICD-10-CM

## 2021-05-21 PROCEDURE — 99212 OFFICE O/P EST SF 10 MIN: CPT | Performed by: NURSE PRACTITIONER

## 2021-05-21 NOTE — PROGRESS NOTES
"Chief Complaint  No chief complaint on file.  Obesity, sleep apnea    Subjective          Scar Velásquez presents to [facility]  History of Present Illness  Taking phentermine for obesity to aide in weight loss  He works for the [employer] and has passed the DOT physical, but his company is requiring that he answer some additional questions regarding the phentermine    The letter from [place] [employer] is asking for the following information:  What medicine is for  Any symptoms  Any side effects pt may be experiencing  And whether there are any necessary work-restrictions or accommodations needed    Scar has been taking phentermine since February of 2021  has been minimally successful with it due to swing shift type schedule  Is taking it daily, thinks he is almost finished with the current course this summer    Review of Systems   Constitutional: Negative.         Appetite has not changed, he does work varied shifts so his po intake changes accordingly   HENT: Negative.    Respiratory: Positive for apnea. Negative for shortness of breath, wheezing and stridor.    Gastrointestinal: Negative.    Neurological: Negative.        Objective   Vital Signs:   /88 (BP Location: Left arm, Patient Position: Sitting, Cuff Size: Adult)   Pulse 85   Temp 99 °F (37.2 °C) (Skin)   Ht 185.4 cm (73\")   Wt (!) 143 kg (315 lb)   SpO2 99%   BMI 41.56 kg/m²     Physical Exam  Vitals reviewed.   Constitutional:       Appearance: Normal appearance. He is well-developed and well-groomed.   HENT:      Head: Normocephalic.   Cardiovascular:      Rate and Rhythm: Normal rate.      Heart sounds: Normal heart sounds.   Pulmonary:      Effort: Pulmonary effort is normal.      Breath sounds: Normal breath sounds.   Musculoskeletal:         General: Normal range of motion.      Cervical back: Normal range of motion.   Skin:     General: Skin is warm.   Neurological:      Mental Status: He is alert and " oriented to person, place, and time.   Psychiatric:         Mood and Affect: Mood normal.        Result Review :                 Assessment and Plan    Diagnoses and all orders for this visit:    1. Morbid (severe) obesity due to excess calories (CMS/HCC) (Primary)        Follow Up   No follow-ups on file.  Patient was given instructions and counseling regarding his condition or for health maintenance advice. Please see specific information pulled into the AVS if appropriate.

## 2021-06-07 ENCOUNTER — TELEPHONE (OUTPATIENT)
Dept: FAMILY MEDICINE CLINIC | Facility: CLINIC | Age: 41
End: 2021-06-07

## 2021-06-07 DIAGNOSIS — E66.01 MORBID (SEVERE) OBESITY DUE TO EXCESS CALORIES (HCC): Primary | ICD-10-CM

## 2021-06-07 NOTE — TELEPHONE ENCOUNTER
Caller: Scar Velásquez    Relationship: Self    Best call back number: [phone]    What is the medical concern/diagnosis: AT HOME SLEEP STUDY     What specialty or service is being requested: AT HOME SLEEP STUDY    What is the provider, practice or medical service name: [facility]    What is the office location: [facility]    What is the office phone number:     Any additional details:  WOULD LIKE AN AT HOME SLEEP STUDY FOR WORK CALLED IN     WOULD NEED TO SAY AT HOME SLEEP STUDY

## 2021-06-10 DIAGNOSIS — E66.01 MORBID (SEVERE) OBESITY DUE TO EXCESS CALORIES (HCC): Primary | ICD-10-CM

## 2021-07-27 ENCOUNTER — HOSPITAL ENCOUNTER (OUTPATIENT)
Dept: SLEEP MEDICINE | Facility: HOSPITAL | Age: 41
Discharge: HOME | End: 2021-07-27
Admitting: NURSE PRACTITIONER

## 2021-07-27 DIAGNOSIS — E66.01 MORBID (SEVERE) OBESITY DUE TO EXCESS CALORIES (HCC): ICD-10-CM

## 2021-07-27 PROCEDURE — 95806 SLEEP STUDY UNATT&RESP EFFT: CPT

## 2021-07-27 PROCEDURE — 95806 SLEEP STUDY UNATT&RESP EFFT: CPT | Performed by: INTERNAL MEDICINE

## 2021-08-10 DIAGNOSIS — G47.33 OSA (OBSTRUCTIVE SLEEP APNEA): Primary | ICD-10-CM

## 2021-08-10 DIAGNOSIS — R06.83 SNORING: ICD-10-CM

## 2022-04-27 ENCOUNTER — LAB (OUTPATIENT)
Dept: FAMILY MEDICINE CLINIC | Facility: CLINIC | Age: 42
End: 2022-04-27

## 2022-04-27 ENCOUNTER — OFFICE VISIT (OUTPATIENT)
Dept: FAMILY MEDICINE CLINIC | Facility: CLINIC | Age: 42
End: 2022-04-27

## 2022-04-27 VITALS
WEIGHT: 309 LBS | OXYGEN SATURATION: 100 % | HEIGHT: 73 IN | HEART RATE: 71 BPM | BODY MASS INDEX: 40.95 KG/M2 | SYSTOLIC BLOOD PRESSURE: 126 MMHG | RESPIRATION RATE: 20 BRPM | DIASTOLIC BLOOD PRESSURE: 82 MMHG | TEMPERATURE: 98 F

## 2022-04-27 DIAGNOSIS — Z12.5 SCREENING PSA (PROSTATE SPECIFIC ANTIGEN): ICD-10-CM

## 2022-04-27 DIAGNOSIS — Z00.00 ENCOUNTER FOR PREVENTIVE HEALTH EXAMINATION: ICD-10-CM

## 2022-04-27 DIAGNOSIS — E78.5 HYPERLIPIDEMIA, UNSPECIFIED HYPERLIPIDEMIA TYPE: Primary | ICD-10-CM

## 2022-04-27 DIAGNOSIS — E78.5 HYPERLIPIDEMIA, UNSPECIFIED HYPERLIPIDEMIA TYPE: ICD-10-CM

## 2022-04-27 LAB
25(OH)D3 SERPL-MCNC: 29.6 NG/ML (ref 30–100)
ALBUMIN SERPL-MCNC: 4.8 G/DL (ref 3.5–5.2)
ALBUMIN/GLOB SERPL: 1.9 G/DL
ALP SERPL-CCNC: 77 U/L (ref 39–117)
ALT SERPL W P-5'-P-CCNC: 14 U/L (ref 1–41)
ANION GAP SERPL CALCULATED.3IONS-SCNC: 15 MMOL/L (ref 5–15)
AST SERPL-CCNC: 21 U/L (ref 1–40)
BILIRUB SERPL-MCNC: 0.4 MG/DL (ref 0–1.2)
BUN SERPL-MCNC: 10 MG/DL (ref 6–20)
BUN/CREAT SERPL: 8.8 (ref 7–25)
CALCIUM SPEC-SCNC: 9.7 MG/DL (ref 8.6–10.5)
CHLORIDE SERPL-SCNC: 99 MMOL/L (ref 98–107)
CHOLEST SERPL-MCNC: 217 MG/DL (ref 0–200)
CO2 SERPL-SCNC: 26 MMOL/L (ref 22–29)
CREAT SERPL-MCNC: 1.13 MG/DL (ref 0.76–1.27)
DEPRECATED RDW RBC AUTO: 39 FL (ref 37–54)
EGFRCR SERPLBLD CKD-EPI 2021: 83.7 ML/MIN/1.73
ERYTHROCYTE [DISTWIDTH] IN BLOOD BY AUTOMATED COUNT: 13.1 % (ref 12.3–15.4)
GLOBULIN UR ELPH-MCNC: 2.5 GM/DL
GLUCOSE SERPL-MCNC: 71 MG/DL (ref 65–99)
HCT VFR BLD AUTO: 42.8 % (ref 37.5–51)
HDLC SERPL-MCNC: 30 MG/DL (ref 40–60)
HGB BLD-MCNC: 14.8 G/DL (ref 13–17.7)
LDLC SERPL CALC-MCNC: 151 MG/DL (ref 0–100)
LDLC/HDLC SERPL: 4.91 {RATIO}
MCH RBC QN AUTO: 28.6 PG (ref 26.6–33)
MCHC RBC AUTO-ENTMCNC: 34.6 G/DL (ref 31.5–35.7)
MCV RBC AUTO: 82.6 FL (ref 79–97)
PLATELET # BLD AUTO: 270 10*3/MM3 (ref 140–450)
PMV BLD AUTO: 11.2 FL (ref 6–12)
POTASSIUM SERPL-SCNC: 4 MMOL/L (ref 3.5–5.2)
PROT SERPL-MCNC: 7.3 G/DL (ref 6–8.5)
PSA SERPL-MCNC: 1.11 NG/ML (ref 0–4)
RBC # BLD AUTO: 5.18 10*6/MM3 (ref 4.14–5.8)
SODIUM SERPL-SCNC: 140 MMOL/L (ref 136–145)
TRIGL SERPL-MCNC: 198 MG/DL (ref 0–150)
TSH SERPL DL<=0.05 MIU/L-ACNC: 3.73 UIU/ML (ref 0.27–4.2)
VLDLC SERPL-MCNC: 36 MG/DL (ref 5–40)
WBC NRBC COR # BLD: 10.19 10*3/MM3 (ref 3.4–10.8)

## 2022-04-27 PROCEDURE — 99396 PREV VISIT EST AGE 40-64: CPT | Performed by: NURSE PRACTITIONER

## 2022-04-27 PROCEDURE — G0103 PSA SCREENING: HCPCS | Performed by: NURSE PRACTITIONER

## 2022-04-27 PROCEDURE — 80050 GENERAL HEALTH PANEL: CPT | Performed by: NURSE PRACTITIONER

## 2022-04-27 PROCEDURE — 36415 COLL VENOUS BLD VENIPUNCTURE: CPT

## 2022-04-27 PROCEDURE — 80061 LIPID PANEL: CPT | Performed by: NURSE PRACTITIONER

## 2022-04-27 PROCEDURE — 82306 VITAMIN D 25 HYDROXY: CPT | Performed by: NURSE PRACTITIONER

## 2022-04-27 NOTE — PROGRESS NOTES
"Chief Complaint  Annual Exam    Subjective          Scar Velásquez presents to [facility]  History of Present Illness  Is here today for annual exam    H/o elevated BP without htn, obesity  FH prostate cancer/father    Meds: naproxen, phentermine    Has used phentermine in the past but has not had much luck  Has lost some weight with it but has been unable to keep it off  Interested to know if there are other options  Can consider tova/orlistat    Exercises regularly, specifically weight lifting at the gym, does not do cardio because he is very active at work  Encouraged to add cardio to his gym work outs    Has a flesh colored lesion on his right shin, cannot tell me how ling it has been present, does not itch or burn, has not had any bleeding from it     is fasting for labs    Review of Systems   Constitutional: Negative for fatigue and unexpected weight change.   Respiratory: Negative.  Negative for cough, chest tightness and wheezing.    Cardiovascular: Negative.  Negative for chest pain and leg swelling.   Gastrointestinal: Negative.  Negative for constipation, diarrhea, nausea and vomiting.   Genitourinary: Negative.  Negative for decreased urine volume, dysuria and frequency.   Musculoskeletal: Positive for arthralgias.        Occasional knee pain, uses naproxen as needed with relief   Allergic/Immunologic: Positive for environmental allergies.   Neurological: Negative.  Negative for dizziness, weakness and headaches.   Psychiatric/Behavioral: Negative.  Negative for dysphoric mood and sleep disturbance. The patient is not nervous/anxious.            Objective   Vital Signs:   /82 (BP Location: Left arm, Patient Position: Sitting)   Pulse 71   Temp 98 °F (36.7 °C) (Temporal)   Resp 20   Ht 185.4 cm (72.99\")   Wt (!) 140 kg (309 lb)   SpO2 100%   BMI 40.78 kg/m²     Physical Exam  Vitals reviewed.   Constitutional:       Appearance: Normal appearance. He is " well-developed and well-groomed.   HENT:      Head: Normocephalic.      Right Ear: Tympanic membrane normal.      Left Ear: Tympanic membrane normal.      Nose: Nose normal.      Mouth/Throat:      Mouth: Mucous membranes are moist.      Pharynx: Oropharynx is clear.   Neck:      Vascular: No carotid bruit.   Cardiovascular:      Rate and Rhythm: Normal rate and regular rhythm.      Heart sounds: Normal heart sounds.   Pulmonary:      Effort: Pulmonary effort is normal.      Breath sounds: Normal breath sounds.   Abdominal:      General: Bowel sounds are normal.      Palpations: Abdomen is soft.      Tenderness: There is no right CVA tenderness, left CVA tenderness or guarding.   Musculoskeletal:         General: Normal range of motion.      Cervical back: Normal range of motion. No tenderness.      Right lower leg: No edema.      Left lower leg: No edema.   Lymphadenopathy:      Cervical: No cervical adenopathy.   Skin:     General: Skin is warm.          Neurological:      Mental Status: He is alert and oriented to person, place, and time.   Psychiatric:         Mood and Affect: Mood normal.        Result Review :                 Assessment and Plan    Diagnoses and all orders for this visit:    1. Hyperlipidemia, unspecified hyperlipidemia type (Primary)  -     Lipid panel; Future    2. Screening PSA (prostate specific antigen)  -     PSA SCREENING; Future    3. Encounter for preventive health examination  -     Comprehensive metabolic panel; Future  -     CBC No Differential; Future  -     PSA SCREENING; Future  -     Vitamin D 25 hydroxy; Future  -     TSH Rfx On Abnormal To Free T4; Future      BMI has not been calculated during today's encounter.          Follow Up   Return in about 1 year (around 4/27/2023) for Annual physical.  Patient was given instructions and counseling regarding his condition or for health maintenance advice. Please see specific information pulled into the AVS if appropriate.

## 2022-06-29 ENCOUNTER — OFFICE VISIT (OUTPATIENT)
Dept: FAMILY MEDICINE CLINIC | Facility: CLINIC | Age: 42
End: 2022-06-29

## 2022-06-29 ENCOUNTER — LAB (OUTPATIENT)
Dept: LAB | Facility: HOSPITAL | Age: 42
End: 2022-06-29

## 2022-06-29 VITALS
SYSTOLIC BLOOD PRESSURE: 120 MMHG | OXYGEN SATURATION: 97 % | RESPIRATION RATE: 18 BRPM | BODY MASS INDEX: 38.3 KG/M2 | TEMPERATURE: 97.5 F | DIASTOLIC BLOOD PRESSURE: 90 MMHG | HEART RATE: 91 BPM | WEIGHT: 289 LBS | HEIGHT: 73 IN

## 2022-06-29 DIAGNOSIS — F41.0 PANIC: ICD-10-CM

## 2022-06-29 DIAGNOSIS — F41.9 ANXIETY: Primary | ICD-10-CM

## 2022-06-29 DIAGNOSIS — F41.9 ANXIETY: ICD-10-CM

## 2022-06-29 PROCEDURE — 80184 ASSAY OF PHENOBARBITAL: CPT

## 2022-06-29 PROCEDURE — 81001 URINALYSIS AUTO W/SCOPE: CPT

## 2022-06-29 PROCEDURE — 80053 COMPREHEN METABOLIC PANEL: CPT

## 2022-06-29 PROCEDURE — 80179 DRUG ASSAY SALICYLATE: CPT

## 2022-06-29 PROCEDURE — 99214 OFFICE O/P EST MOD 30 MIN: CPT | Performed by: NURSE PRACTITIONER

## 2022-06-29 PROCEDURE — G0480 DRUG TEST DEF 1-7 CLASSES: HCPCS

## 2022-06-29 PROCEDURE — 93005 ELECTROCARDIOGRAM TRACING: CPT | Performed by: NURSE PRACTITIONER

## 2022-06-29 PROCEDURE — 36415 COLL VENOUS BLD VENIPUNCTURE: CPT

## 2022-06-29 PROCEDURE — 84443 ASSAY THYROID STIM HORMONE: CPT

## 2022-06-29 PROCEDURE — 82077 ASSAY SPEC XCP UR&BREATH IA: CPT

## 2022-06-29 NOTE — PROGRESS NOTES
Chief Complaint  Anxiety    Subjective          Scar Velásquez presents to [facility]  History of Present Illness    Is here today with c/o 3 weeks on new onset anxiety  Was seen at the  yesterday when he became overwhelmed at work  Was prescribed hydroxyzine    He tells me today that about 3 weeks ago when getting ready to go on vacation he started feeling anxious, they went to [place] on and while there on vacation he was experiencing extreme sadness    Is feeling like he is having a constant panic attack for the past week and a half  He has no appetite  Sleeping a lot  No fever    He tells me that he last felt normal around 5/20/22  Left on vacation 6/11/22  Decreased appetite was noticed while on vacation    He did have several problems just prior to vacation, but those problems have been resolved    He tells me that hte hydroxyzine did help ease his feeling of anxiety but is not resolved    Denies any substance use - is not a smoker, does not use any recreational drugs, drinks only rarely    Review of Systems   Constitutional: Positive for activity change, appetite change and diaphoresis. Negative for fever.        Increased sleep    Respiratory: Positive for cough.         Chest tightness when panic is intense    Intermittent cough for a couple of weeks, occasionally productive of clear sputum   Cardiovascular: Negative for chest pain.        Feels like he has butterflies in his chest  denies any rapid heart rate   Gastrointestinal: Positive for diarrhea and nausea. Negative for abdominal pain and constipation.        Having dry heaves   Endocrine:        Hot and cold flashes   Genitourinary: Negative.    Allergic/Immunologic: Positive for environmental allergies.        Uses claritin or zytec for seasonal/environmental allergies   Neurological: Positive for headaches.        Had a headache for a couple of days along with a cough and cold symptoms when he returned from  "vacatoin   Psychiatric/Behavioral: Positive for sleep disturbance. The patient is nervous/anxious.         Increased sleep     Objective   Vital Signs:  /90 (BP Location: Left arm, Patient Position: Sitting, Cuff Size: Large Adult)   Pulse 91   Temp 97.5 °F (36.4 °C) (Infrared)   Resp 18   Ht 185.4 cm (73\")   Wt 131 kg (289 lb)   SpO2 97%   BMI 38.13 kg/m²     BP Readings from Last 3 Encounters:   06/29/22 120/90   06/28/22 134/86   04/27/22 126/82        Wt Readings from Last 3 Encounters:   06/29/22 131 kg (289 lb)   06/28/22 132 kg (291 lb)   04/27/22 (!) 140 kg (309 lb)              Physical Exam  Vitals reviewed.   Constitutional:       Appearance: Normal appearance.   Neck:      Vascular: No carotid bruit.   Cardiovascular:      Rate and Rhythm: Normal rate and regular rhythm.      Pulses: Normal pulses.      Heart sounds: Normal heart sounds.   Pulmonary:      Effort: Pulmonary effort is normal.      Breath sounds: Normal breath sounds.   Abdominal:      General: Bowel sounds are normal.      Palpations: Abdomen is soft.      Tenderness: There is no abdominal tenderness.   Musculoskeletal:      Cervical back: Neck supple. No tenderness.      Right lower leg: No edema.      Left lower leg: No edema.   Lymphadenopathy:      Cervical: No cervical adenopathy.   Skin:     General: Skin is warm.   Neurological:      Mental Status: He is alert and oriented to person, place, and time.   Psychiatric:         Mood and Affect: Mood is anxious.         Speech: Speech normal.         Behavior: Behavior is agitated.         Thought Content: Thought content normal.        Result Review :       4/27/22:  Vitamin D 29.6    CMP    CMP 4/27/22   Glucose 71   BUN 10   Creatinine 1.13   Sodium 140   Potassium 4.0   Chloride 99   Calcium 9.7   Albumin 4.80   Total Bilirubin 0.4   Alkaline Phosphatase 77   AST (SGOT) 21   ALT (SGPT) 14           CBC    CBC 4/27/22   WBC 10.19   RBC 5.18   Hemoglobin 14.8   Hematocrit " 42.8   MCV 82.6   MCH 28.6   MCHC 34.6   RDW 13.1   Platelets 270           Lipid Panel    Lipid Panel 4/27/22   Total Cholesterol 217 (A)   Triglycerides 198 (A)   HDL Cholesterol 30 (A)   VLDL Cholesterol 36   LDL Cholesterol  151 (A)   LDL/HDL Ratio 4.91   (A) Abnormal value            TSH    TSH 4/27/22   TSH 3.730                    ECG 12 Lead    Date/Time: 6/29/2022 5:09 PM  Performed by: Kenneth Reeder APRN  Authorized by: Kenneth Reeder APRN   Rhythm: sinus rhythm  Rate: normal  QRS axis: normal    Clinical impression: normal ECG              Assessment and Plan    Diagnoses and all orders for this visit:    1. Anxiety (Primary)  -     TSH Rfx On Abnormal To Free T4; Future  -     Comprehensive metabolic panel; Future  -     ECG 12 Lead  -     Urinalysis With Culture If Indicated - Urine, Clean Catch; Future  -     Toxicology Screen, Serum; Future    2. Panic  -     TSH Rfx On Abnormal To Free T4; Future  -     Comprehensive metabolic panel; Future  -     ECG 12 Lead  -     Urinalysis With Culture If Indicated - Urine, Clean Catch; Future  -     Toxicology Screen, Serum; Future    EKG with sinus rhythm, check labs, continue hydroxyzine prn, advised to go to ER if symptoms worsen       Follow Up   Return if symptoms worsen or fail to improve.  Patient was given instructions and counseling regarding his condition or for health maintenance advice. Please see specific information pulled into the AVS if appropriate.

## 2022-06-30 LAB
ALBUMIN SERPL-MCNC: 4.7 G/DL (ref 3.5–5.2)
ALBUMIN/GLOB SERPL: 1.6 G/DL
ALP SERPL-CCNC: 84 U/L (ref 39–117)
ALT SERPL W P-5'-P-CCNC: 17 U/L (ref 1–41)
ANION GAP SERPL CALCULATED.3IONS-SCNC: 12 MMOL/L (ref 5–15)
AST SERPL-CCNC: 15 U/L (ref 1–40)
BACTERIA UR QL AUTO: ABNORMAL /HPF
BILIRUB SERPL-MCNC: 0.5 MG/DL (ref 0–1.2)
BILIRUB UR QL STRIP: NEGATIVE
BUN SERPL-MCNC: 14 MG/DL (ref 6–20)
BUN/CREAT SERPL: 12.8 (ref 7–25)
CALCIUM SPEC-SCNC: 9.7 MG/DL (ref 8.6–10.5)
CHLORIDE SERPL-SCNC: 101 MMOL/L (ref 98–107)
CLARITY UR: CLEAR
CO2 SERPL-SCNC: 25 MMOL/L (ref 22–29)
COLOR UR: ABNORMAL
CREAT SERPL-MCNC: 1.09 MG/DL (ref 0.76–1.27)
EGFRCR SERPLBLD CKD-EPI 2021: 87.4 ML/MIN/1.73
GLOBULIN UR ELPH-MCNC: 2.9 GM/DL
GLUCOSE SERPL-MCNC: 91 MG/DL (ref 65–99)
GLUCOSE UR STRIP-MCNC: NEGATIVE MG/DL
HGB UR QL STRIP.AUTO: NEGATIVE
HYALINE CASTS UR QL AUTO: ABNORMAL /LPF
KETONES UR QL STRIP: ABNORMAL
LEUKOCYTE ESTERASE UR QL STRIP.AUTO: NEGATIVE
NITRITE UR QL STRIP: NEGATIVE
PH UR STRIP.AUTO: 6 [PH] (ref 5–8)
POTASSIUM SERPL-SCNC: 4.5 MMOL/L (ref 3.5–5.2)
PROT SERPL-MCNC: 7.6 G/DL (ref 6–8.5)
PROT UR QL STRIP: ABNORMAL
RBC # UR STRIP: ABNORMAL /HPF
REF LAB TEST METHOD: ABNORMAL
SODIUM SERPL-SCNC: 138 MMOL/L (ref 136–145)
SP GR UR STRIP: >=1.03 (ref 1–1.03)
SQUAMOUS #/AREA URNS HPF: ABNORMAL /HPF
TSH SERPL DL<=0.05 MIU/L-ACNC: 1.74 UIU/ML (ref 0.27–4.2)
UROBILINOGEN UR QL STRIP: ABNORMAL
WBC # UR STRIP: ABNORMAL /HPF

## 2022-07-05 LAB
ACETONE SERPL-MCNC: <0.01 G/DL (ref 0–0.01)
BUTALBITAL SERPL-MCNC: <1 UG/ML (ref 1–10)
CHLORDIAZEP SERPL-MCNC: <0.1 UG/ML (ref 0.1–0.9)
DIAZEPAM SERPL-MCNC: <0.1 UG/ML (ref 0.1–0.9)
ETHANOL BLD GC-MCNC: <0.01 G/DL (ref 0–0.01)
ISOPROPANOL SERPL-MCNC: <0.01 G/DL (ref 0–0.01)
Lab: ABNORMAL
METHANOL SERPL-MCNC: <0.01 G/DL (ref 0–0.01)
NORCHLORDIAZEP SERPL-MCNC: <0.1 UG/ML (ref 0.1–0.6)
NORDIAZEPAM SERPL-MCNC: <0.1 UG/ML (ref 0.1–1.4)
PENTOBARB SERPL-MCNC: <1 UG/ML (ref 1–5)
PHENOBARB SERPL-MCNC: <1 UG/ML (ref 15–40)
SALICYLATES SERPL-MCNC: ABNORMAL UG/ML (ref 30–250)

## 2022-07-06 NOTE — PROGRESS NOTES
"Left patient  with results. Told him to give the office a call if he had any questions    FOR HUB TO SAY    \"Please call Scar with results -   All labs are normal/negative.  Please ask him to schedule a return visit if he is still having symptoms.  Thank you\""

## 2023-03-08 ENCOUNTER — TELEPHONE (OUTPATIENT)
Dept: FAMILY MEDICINE CLINIC | Facility: CLINIC | Age: 43
End: 2023-03-08
Payer: COMMERCIAL

## 2023-03-08 NOTE — TELEPHONE ENCOUNTER
HUB TO SHARE: SENT FOLLOWING [employer] MESSAGE. I changed your care to Al Zhu and you were not due for a physical until the end of April so I scheduled you at his next available on 5/1/23 at 1:30 pm. If that will not work you can call the office at [phone] to reschedule.

## 2023-03-08 NOTE — TELEPHONE ENCOUNTER
PRACTICE MANAGER     Caller: Scar Velásquez    Relationship: Self    Best call back number: [phone] ,  [phone] (FRANCES)    Who is your current provider: DALTON SILVA     Who would you like your new provider to be:FRANCISCO BELTRÁN     What are your reasons for transferring care: COMFORT LEVEL       PHYSICAL NEEDED

## 2023-05-01 ENCOUNTER — LAB (OUTPATIENT)
Dept: FAMILY MEDICINE CLINIC | Facility: CLINIC | Age: 43
End: 2023-05-01
Payer: COMMERCIAL

## 2023-05-01 ENCOUNTER — OFFICE VISIT (OUTPATIENT)
Dept: FAMILY MEDICINE CLINIC | Facility: CLINIC | Age: 43
End: 2023-05-01
Payer: COMMERCIAL

## 2023-05-01 VITALS
DIASTOLIC BLOOD PRESSURE: 80 MMHG | BODY MASS INDEX: 37.67 KG/M2 | HEART RATE: 66 BPM | RESPIRATION RATE: 20 BRPM | WEIGHT: 284.2 LBS | SYSTOLIC BLOOD PRESSURE: 125 MMHG | TEMPERATURE: 97.8 F | HEIGHT: 73 IN | OXYGEN SATURATION: 98 %

## 2023-05-01 DIAGNOSIS — Z00.00 ENCOUNTER FOR PREVENTIVE HEALTH EXAMINATION: ICD-10-CM

## 2023-05-01 DIAGNOSIS — Z00.00 ENCOUNTER FOR PREVENTIVE HEALTH EXAMINATION: Primary | ICD-10-CM

## 2023-05-01 DIAGNOSIS — F90.0 ATTENTION DEFICIT HYPERACTIVITY DISORDER (ADHD), PREDOMINANTLY INATTENTIVE TYPE: ICD-10-CM

## 2023-05-01 PROCEDURE — 36415 COLL VENOUS BLD VENIPUNCTURE: CPT

## 2023-05-01 PROCEDURE — 80061 LIPID PANEL: CPT | Performed by: PHYSICIAN ASSISTANT

## 2023-05-01 PROCEDURE — 99396 PREV VISIT EST AGE 40-64: CPT | Performed by: PHYSICIAN ASSISTANT

## 2023-05-01 PROCEDURE — 80050 GENERAL HEALTH PANEL: CPT | Performed by: PHYSICIAN ASSISTANT

## 2023-05-01 RX ORDER — NAPROXEN 500 MG/1
500 TABLET ORAL 2 TIMES DAILY WITH MEALS
Qty: 180 TABLET | Refills: 3 | Status: SHIPPED | OUTPATIENT
Start: 2023-05-01 | End: 2024-04-25

## 2023-05-01 NOTE — PROGRESS NOTES
"Ede Velásquez is a 42 y.o. male.     Chief Complaint   Patient presents with   • Annual Exam     Patient thinks he may be ADD/ADHD       /80   Pulse 66   Temp 97.8 °F (36.6 °C) (Infrared)   Resp 20   Ht 185.4 cm (72.99\")   Wt 129 kg (284 lb 3.2 oz)   SpO2 98%   BMI 37.50 kg/m²     BP Readings from Last 3 Encounters:   05/01/23 125/80   06/29/22 120/90   06/28/22 134/86       Wt Readings from Last 3 Encounters:   05/01/23 129 kg (284 lb 3.2 oz)   06/29/22 131 kg (289 lb)   06/28/22 132 kg (291 lb)       HPI Presents to the clinic for annual physical and to discuss possible add. He has previously seen  in the past and was seeing Nitza here. He has been having a lot of difficulty staying on track at work. Hours have changed in his job making this difficult. Previously struggled with this in the past. He denies chest pain, soa, palpitations. He tries to exercise and eat healthy when possible. Not due for c-scope yet. No abdominal issues. He does have joint pain throughout at times.     The following portions of the patient's history were reviewed and updated as appropriate: allergies, current medications, past family history, past medical history, past social history, past surgical history and problem list.    Review of Systems    Objective   Physical Exam  Constitutional:       Appearance: Normal appearance.   Eyes:      Extraocular Movements: Extraocular movements intact.      Pupils: Pupils are equal, round, and reactive to light.   Cardiovascular:      Rate and Rhythm: Normal rate.      Heart sounds: No murmur heard.  Pulmonary:      Effort: Pulmonary effort is normal.      Breath sounds: No wheezing.   Neurological:      General: No focal deficit present.      Mental Status: He is alert and oriented to person, place, and time.   Psychiatric:         Mood and Affect: Mood normal.         Behavior: Behavior normal.           Diagnoses and all orders for this visit:    1. Encounter for " preventive health examination (Primary)  -     CBC w AUTO Differential; Future  -     Comprehensive metabolic panel; Future  -     Lipid panel; Future  -     TSH Rfx On Abnormal To Free T4; Future    2. Attention deficit hyperactivity disorder (ADHD), predominantly inattentive type  -     lisdexamfetamine (Vyvanse) 50 MG capsule; Take 1 capsule by mouth Every Morning  Dispense: 30 capsule; Refill: 0    Other orders  -     naproxen (Naprosyn) 500 MG tablet; Take 1 tablet by mouth 2 (Two) Times a Day With Meals for 360 days.  Dispense: 180 tablet; Refill: 3      Healthy eating habits. Low saturated fats. High plant based. Avoid processed foods. 15-30 min of cardiovascular exercise 5 days per week with atleast 2-3 days of resistance training.       Return in about 6 months (around 11/1/2023) for Recheck.

## 2023-05-02 ENCOUNTER — TELEPHONE (OUTPATIENT)
Dept: FAMILY MEDICINE CLINIC | Facility: CLINIC | Age: 43
End: 2023-05-02
Payer: COMMERCIAL

## 2023-05-02 LAB
ALBUMIN SERPL-MCNC: 4.8 G/DL (ref 3.5–5.2)
ALBUMIN/GLOB SERPL: 1.8 G/DL
ALP SERPL-CCNC: 73 U/L (ref 39–117)
ALT SERPL W P-5'-P-CCNC: 14 U/L (ref 1–41)
ANION GAP SERPL CALCULATED.3IONS-SCNC: 12 MMOL/L (ref 5–15)
AST SERPL-CCNC: 13 U/L (ref 1–40)
BASOPHILS # BLD AUTO: 0.12 10*3/MM3 (ref 0–0.2)
BASOPHILS NFR BLD AUTO: 1.3 % (ref 0–1.5)
BILIRUB SERPL-MCNC: 0.3 MG/DL (ref 0–1.2)
BUN SERPL-MCNC: 11 MG/DL (ref 6–20)
BUN/CREAT SERPL: 9.7 (ref 7–25)
CALCIUM SPEC-SCNC: 9.5 MG/DL (ref 8.6–10.5)
CHLORIDE SERPL-SCNC: 102 MMOL/L (ref 98–107)
CHOLEST SERPL-MCNC: 210 MG/DL (ref 0–200)
CO2 SERPL-SCNC: 28 MMOL/L (ref 22–29)
CREAT SERPL-MCNC: 1.13 MG/DL (ref 0.76–1.27)
DEPRECATED RDW RBC AUTO: 38.1 FL (ref 37–54)
EGFRCR SERPLBLD CKD-EPI 2021: 83.2 ML/MIN/1.73
EOSINOPHIL # BLD AUTO: 0.57 10*3/MM3 (ref 0–0.4)
EOSINOPHIL NFR BLD AUTO: 6 % (ref 0.3–6.2)
ERYTHROCYTE [DISTWIDTH] IN BLOOD BY AUTOMATED COUNT: 12.4 % (ref 12.3–15.4)
GLOBULIN UR ELPH-MCNC: 2.6 GM/DL
GLUCOSE SERPL-MCNC: 80 MG/DL (ref 65–99)
HCT VFR BLD AUTO: 47.8 % (ref 37.5–51)
HDLC SERPL-MCNC: 29 MG/DL (ref 40–60)
HGB BLD-MCNC: 16.2 G/DL (ref 13–17.7)
IMM GRANULOCYTES # BLD AUTO: 0.03 10*3/MM3 (ref 0–0.05)
IMM GRANULOCYTES NFR BLD AUTO: 0.3 % (ref 0–0.5)
LDLC SERPL CALC-MCNC: 150 MG/DL (ref 0–100)
LDLC/HDLC SERPL: 5.08 {RATIO}
LYMPHOCYTES # BLD AUTO: 2.12 10*3/MM3 (ref 0.7–3.1)
LYMPHOCYTES NFR BLD AUTO: 22.3 % (ref 19.6–45.3)
MCH RBC QN AUTO: 28.5 PG (ref 26.6–33)
MCHC RBC AUTO-ENTMCNC: 33.9 G/DL (ref 31.5–35.7)
MCV RBC AUTO: 84.2 FL (ref 79–97)
MONOCYTES # BLD AUTO: 0.73 10*3/MM3 (ref 0.1–0.9)
MONOCYTES NFR BLD AUTO: 7.7 % (ref 5–12)
NEUTROPHILS NFR BLD AUTO: 5.93 10*3/MM3 (ref 1.7–7)
NEUTROPHILS NFR BLD AUTO: 62.4 % (ref 42.7–76)
NRBC BLD AUTO-RTO: 0 /100 WBC (ref 0–0.2)
PLATELET # BLD AUTO: 254 10*3/MM3 (ref 140–450)
PMV BLD AUTO: 11.2 FL (ref 6–12)
POTASSIUM SERPL-SCNC: 4 MMOL/L (ref 3.5–5.2)
PROT SERPL-MCNC: 7.4 G/DL (ref 6–8.5)
RBC # BLD AUTO: 5.68 10*6/MM3 (ref 4.14–5.8)
SODIUM SERPL-SCNC: 142 MMOL/L (ref 136–145)
TRIGL SERPL-MCNC: 169 MG/DL (ref 0–150)
TSH SERPL DL<=0.05 MIU/L-ACNC: 3.12 UIU/ML (ref 0.27–4.2)
VLDLC SERPL-MCNC: 31 MG/DL (ref 5–40)
WBC NRBC COR # BLD: 9.5 10*3/MM3 (ref 3.4–10.8)

## 2023-05-02 NOTE — TELEPHONE ENCOUNTER
----- Message from Gaetano Zhu PA-C sent at 5/2/2023 12:45 PM EDT -----  Labs good other than cholesterol is high. We will work on diet and exercise before needing to do any sort of medication for this.

## 2023-05-02 NOTE — TELEPHONE ENCOUNTER
HUB TO SHARE. Labs look good except cholesterol is high will need to try diet and exercise before starting any medication  at this time. Left message to call

## 2023-05-24 DIAGNOSIS — F90.0 ATTENTION DEFICIT HYPERACTIVITY DISORDER (ADHD), PREDOMINANTLY INATTENTIVE TYPE: ICD-10-CM

## 2023-07-26 DIAGNOSIS — F90.0 ATTENTION DEFICIT HYPERACTIVITY DISORDER (ADHD), PREDOMINANTLY INATTENTIVE TYPE: ICD-10-CM

## 2023-08-22 DIAGNOSIS — F90.0 ATTENTION DEFICIT HYPERACTIVITY DISORDER (ADHD), PREDOMINANTLY INATTENTIVE TYPE: ICD-10-CM

## 2023-09-22 DIAGNOSIS — F90.0 ATTENTION DEFICIT HYPERACTIVITY DISORDER (ADHD), PREDOMINANTLY INATTENTIVE TYPE: ICD-10-CM

## 2023-09-25 RX ORDER — LISDEXAMFETAMINE DIMESYLATE CAPSULES 50 MG/1
50 CAPSULE ORAL EVERY MORNING
Qty: 30 CAPSULE | Refills: 0 | Status: SHIPPED | OUTPATIENT
Start: 2023-09-25

## 2023-10-09 ENCOUNTER — TELEPHONE (OUTPATIENT)
Dept: FAMILY MEDICINE CLINIC | Facility: CLINIC | Age: 43
End: 2023-10-09
Payer: COMMERCIAL

## 2023-10-09 DIAGNOSIS — F90.0 ATTENTION DEFICIT HYPERACTIVITY DISORDER (ADHD), PREDOMINANTLY INATTENTIVE TYPE: ICD-10-CM

## 2023-10-09 RX ORDER — LISDEXAMFETAMINE DIMESYLATE CAPSULES 50 MG/1
50 CAPSULE ORAL EVERY MORNING
Qty: 30 CAPSULE | Refills: 0 | Status: SHIPPED | OUTPATIENT
Start: 2023-10-09

## 2023-10-09 NOTE — TELEPHONE ENCOUNTER
Caller: RUGGIEROJESS    Relationship: Emergency Contact    Best call back number: [phone]    Requested Prescriptions:   Requested Prescriptions     Pending Prescriptions Disp Refills    lisdexamfetamine (Vyvanse) 50 MG capsule 30 capsule 0     Sig: Take 1 capsule by mouth Every Morning        Pharmacy where request should be sent: [id number] - [place], [place] - [address] - [phone]  - [phone] FX     Last office visit with prescribing clinician: 5/1/2023   Last telemedicine visit with prescribing clinician: Visit date not found   Next office visit with prescribing clinician: Visit date not found     Additional details provided by patient:     Does the patient have less than a 3 day supply:  [] Yes  [] No    Would you like a call back once the refill request has been completed: [] Yes [] No    If the office needs to give you a call back, can they leave a voicemail: [] Yes [] No    Zainab Machado Rep   10/09/23 11:13 EDT

## 2023-12-02 DIAGNOSIS — F90.0 ATTENTION DEFICIT HYPERACTIVITY DISORDER (ADHD), PREDOMINANTLY INATTENTIVE TYPE: ICD-10-CM

## 2023-12-04 RX ORDER — LISDEXAMFETAMINE DIMESYLATE CAPSULES 50 MG/1
50 CAPSULE ORAL EVERY MORNING
Qty: 30 CAPSULE | Refills: 0 | Status: SHIPPED | OUTPATIENT
Start: 2023-12-04

## 2024-01-02 DIAGNOSIS — F90.0 ATTENTION DEFICIT HYPERACTIVITY DISORDER (ADHD), PREDOMINANTLY INATTENTIVE TYPE: ICD-10-CM

## 2024-01-02 RX ORDER — LISDEXAMFETAMINE DIMESYLATE CAPSULES 50 MG/1
50 CAPSULE ORAL EVERY MORNING
Qty: 30 CAPSULE | Refills: 0 | Status: SHIPPED | OUTPATIENT
Start: 2024-01-02

## 2024-01-31 DIAGNOSIS — F90.0 ATTENTION DEFICIT HYPERACTIVITY DISORDER (ADHD), PREDOMINANTLY INATTENTIVE TYPE: ICD-10-CM

## 2024-02-01 RX ORDER — LISDEXAMFETAMINE DIMESYLATE CAPSULES 50 MG/1
50 CAPSULE ORAL EVERY MORNING
Qty: 30 CAPSULE | Refills: 0 | Status: SHIPPED | OUTPATIENT
Start: 2024-02-01

## 2024-03-05 DIAGNOSIS — F90.0 ATTENTION DEFICIT HYPERACTIVITY DISORDER (ADHD), PREDOMINANTLY INATTENTIVE TYPE: ICD-10-CM

## 2024-03-08 RX ORDER — LISDEXAMFETAMINE DIMESYLATE CAPSULES 50 MG/1
50 CAPSULE ORAL EVERY MORNING
Qty: 30 CAPSULE | Refills: 0 | Status: SHIPPED | OUTPATIENT
Start: 2024-03-08

## 2024-04-08 DIAGNOSIS — F90.0 ATTENTION DEFICIT HYPERACTIVITY DISORDER (ADHD), PREDOMINANTLY INATTENTIVE TYPE: ICD-10-CM

## 2024-04-08 RX ORDER — LISDEXAMFETAMINE DIMESYLATE CAPSULES 50 MG/1
50 CAPSULE ORAL EVERY MORNING
Qty: 30 CAPSULE | Refills: 0 | Status: SHIPPED | OUTPATIENT
Start: 2024-04-08

## 2024-05-06 ENCOUNTER — LAB (OUTPATIENT)
Dept: FAMILY MEDICINE CLINIC | Facility: CLINIC | Age: 44
End: 2024-05-06
Payer: COMMERCIAL

## 2024-05-06 ENCOUNTER — OFFICE VISIT (OUTPATIENT)
Dept: FAMILY MEDICINE CLINIC | Facility: CLINIC | Age: 44
End: 2024-05-06
Payer: COMMERCIAL

## 2024-05-06 VITALS
HEIGHT: 73 IN | HEART RATE: 70 BPM | WEIGHT: 274.8 LBS | SYSTOLIC BLOOD PRESSURE: 134 MMHG | DIASTOLIC BLOOD PRESSURE: 86 MMHG | OXYGEN SATURATION: 99 % | BODY MASS INDEX: 36.42 KG/M2 | RESPIRATION RATE: 16 BRPM

## 2024-05-06 DIAGNOSIS — F90.0 ATTENTION DEFICIT HYPERACTIVITY DISORDER (ADHD), PREDOMINANTLY INATTENTIVE TYPE: ICD-10-CM

## 2024-05-06 DIAGNOSIS — Z00.00 PHYSICAL EXAM: Primary | ICD-10-CM

## 2024-05-06 PROCEDURE — 84439 ASSAY OF FREE THYROXINE: CPT | Performed by: PHYSICIAN ASSISTANT

## 2024-05-06 PROCEDURE — 36415 COLL VENOUS BLD VENIPUNCTURE: CPT | Performed by: PHYSICIAN ASSISTANT

## 2024-05-06 PROCEDURE — 83036 HEMOGLOBIN GLYCOSYLATED A1C: CPT | Performed by: PHYSICIAN ASSISTANT

## 2024-05-06 PROCEDURE — 80050 GENERAL HEALTH PANEL: CPT | Performed by: PHYSICIAN ASSISTANT

## 2024-05-06 PROCEDURE — 99396 PREV VISIT EST AGE 40-64: CPT | Performed by: PHYSICIAN ASSISTANT

## 2024-05-06 PROCEDURE — 82607 VITAMIN B-12: CPT | Performed by: PHYSICIAN ASSISTANT

## 2024-05-06 PROCEDURE — 80061 LIPID PANEL: CPT | Performed by: PHYSICIAN ASSISTANT

## 2024-05-06 PROCEDURE — 82306 VITAMIN D 25 HYDROXY: CPT | Performed by: PHYSICIAN ASSISTANT

## 2024-05-06 RX ORDER — LISDEXAMFETAMINE DIMESYLATE 50 MG/1
50 CAPSULE ORAL EVERY MORNING
Qty: 30 CAPSULE | Refills: 0 | Status: SHIPPED | OUTPATIENT
Start: 2024-05-06

## 2024-05-06 RX ORDER — LISDEXAMFETAMINE DIMESYLATE 50 MG/1
50 CAPSULE ORAL EVERY MORNING
Qty: 30 CAPSULE | Refills: 0 | Status: SHIPPED | OUTPATIENT
Start: 2024-05-06 | End: 2024-05-06 | Stop reason: SDUPTHER

## 2024-05-07 DIAGNOSIS — E78.2 MIXED HYPERLIPIDEMIA: Primary | ICD-10-CM

## 2024-05-07 LAB
25(OH)D3 SERPL-MCNC: 28.5 NG/ML (ref 30–100)
ALBUMIN SERPL-MCNC: 4.4 G/DL (ref 3.5–5.2)
ALBUMIN/GLOB SERPL: 1.7 G/DL
ALP SERPL-CCNC: 75 U/L (ref 39–117)
ALT SERPL W P-5'-P-CCNC: 12 U/L (ref 1–41)
ANION GAP SERPL CALCULATED.3IONS-SCNC: 9.9 MMOL/L (ref 5–15)
AST SERPL-CCNC: 16 U/L (ref 1–40)
BASOPHILS # BLD AUTO: 0.14 10*3/MM3 (ref 0–0.2)
BASOPHILS NFR BLD AUTO: 1.3 % (ref 0–1.5)
BILIRUB SERPL-MCNC: 0.3 MG/DL (ref 0–1.2)
BUN SERPL-MCNC: 14 MG/DL (ref 6–20)
BUN/CREAT SERPL: 13.5 (ref 7–25)
CALCIUM SPEC-SCNC: 9.5 MG/DL (ref 8.6–10.5)
CHLORIDE SERPL-SCNC: 104 MMOL/L (ref 98–107)
CHOLEST SERPL-MCNC: 245 MG/DL (ref 0–200)
CO2 SERPL-SCNC: 25.1 MMOL/L (ref 22–29)
CREAT SERPL-MCNC: 1.04 MG/DL (ref 0.76–1.27)
DEPRECATED RDW RBC AUTO: 40.2 FL (ref 37–54)
EGFRCR SERPLBLD CKD-EPI 2021: 91.4 ML/MIN/1.73
EOSINOPHIL # BLD AUTO: 1.01 10*3/MM3 (ref 0–0.4)
EOSINOPHIL NFR BLD AUTO: 9.7 % (ref 0.3–6.2)
ERYTHROCYTE [DISTWIDTH] IN BLOOD BY AUTOMATED COUNT: 12.9 % (ref 12.3–15.4)
GLOBULIN UR ELPH-MCNC: 2.6 GM/DL
GLUCOSE SERPL-MCNC: 95 MG/DL (ref 65–99)
HBA1C MFR BLD: 5.4 % (ref 4.8–5.6)
HCT VFR BLD AUTO: 45.9 % (ref 37.5–51)
HDLC SERPL-MCNC: 30 MG/DL (ref 40–60)
HGB BLD-MCNC: 15.5 G/DL (ref 13–17.7)
IMM GRANULOCYTES # BLD AUTO: 0.03 10*3/MM3 (ref 0–0.05)
IMM GRANULOCYTES NFR BLD AUTO: 0.3 % (ref 0–0.5)
LDLC SERPL CALC-MCNC: 191 MG/DL (ref 0–100)
LDLC/HDLC SERPL: 6.31 {RATIO}
LYMPHOCYTES # BLD AUTO: 2.27 10*3/MM3 (ref 0.7–3.1)
LYMPHOCYTES NFR BLD AUTO: 21.8 % (ref 19.6–45.3)
MCH RBC QN AUTO: 29.2 PG (ref 26.6–33)
MCHC RBC AUTO-ENTMCNC: 33.8 G/DL (ref 31.5–35.7)
MCV RBC AUTO: 86.4 FL (ref 79–97)
MONOCYTES # BLD AUTO: 0.75 10*3/MM3 (ref 0.1–0.9)
MONOCYTES NFR BLD AUTO: 7.2 % (ref 5–12)
NEUTROPHILS NFR BLD AUTO: 59.7 % (ref 42.7–76)
NEUTROPHILS NFR BLD AUTO: 6.19 10*3/MM3 (ref 1.7–7)
NRBC BLD AUTO-RTO: 0 /100 WBC (ref 0–0.2)
PLATELET # BLD AUTO: 257 10*3/MM3 (ref 140–450)
PMV BLD AUTO: 11.2 FL (ref 6–12)
POTASSIUM SERPL-SCNC: 4.6 MMOL/L (ref 3.5–5.2)
PROT SERPL-MCNC: 7 G/DL (ref 6–8.5)
RBC # BLD AUTO: 5.31 10*6/MM3 (ref 4.14–5.8)
SODIUM SERPL-SCNC: 139 MMOL/L (ref 136–145)
T4 FREE SERPL-MCNC: 1.11 NG/DL (ref 0.93–1.7)
TRIGL SERPL-MCNC: 129 MG/DL (ref 0–150)
TSH SERPL DL<=0.05 MIU/L-ACNC: 2.32 UIU/ML (ref 0.27–4.2)
VIT B12 BLD-MCNC: 378 PG/ML (ref 211–946)
VLDLC SERPL-MCNC: 24 MG/DL (ref 5–40)
WBC NRBC COR # BLD AUTO: 10.39 10*3/MM3 (ref 3.4–10.8)

## 2024-05-18 ENCOUNTER — HOSPITAL ENCOUNTER (OUTPATIENT)
Dept: CT IMAGING | Facility: HOSPITAL | Age: 44
Discharge: HOME | End: 2024-05-18
Admitting: PHYSICIAN ASSISTANT
Payer: COMMERCIAL

## 2024-05-18 DIAGNOSIS — E78.2 MIXED HYPERLIPIDEMIA: ICD-10-CM

## 2024-05-18 PROCEDURE — 75571 CT HRT W/O DYE W/CA TEST: CPT

## 2024-06-05 DIAGNOSIS — F90.0 ATTENTION DEFICIT HYPERACTIVITY DISORDER (ADHD), PREDOMINANTLY INATTENTIVE TYPE: ICD-10-CM

## 2024-06-06 RX ORDER — LISDEXAMFETAMINE DIMESYLATE 50 MG/1
50 CAPSULE ORAL EVERY MORNING
Qty: 30 CAPSULE | Refills: 0 | Status: SHIPPED | OUTPATIENT
Start: 2024-06-06

## 2024-07-07 DIAGNOSIS — F90.0 ATTENTION DEFICIT HYPERACTIVITY DISORDER (ADHD), PREDOMINANTLY INATTENTIVE TYPE: ICD-10-CM

## 2024-07-09 RX ORDER — LISDEXAMFETAMINE DIMESYLATE 50 MG/1
50 CAPSULE ORAL EVERY MORNING
Qty: 30 CAPSULE | Refills: 0 | Status: SHIPPED | OUTPATIENT
Start: 2024-07-09

## 2024-08-04 DIAGNOSIS — F90.0 ATTENTION DEFICIT HYPERACTIVITY DISORDER (ADHD), PREDOMINANTLY INATTENTIVE TYPE: ICD-10-CM

## 2024-08-05 ENCOUNTER — TELEPHONE (OUTPATIENT)
Dept: FAMILY MEDICINE CLINIC | Facility: CLINIC | Age: 44
End: 2024-08-05

## 2024-08-05 RX ORDER — LISDEXAMFETAMINE DIMESYLATE 50 MG/1
50 CAPSULE ORAL EVERY MORNING
Qty: 30 CAPSULE | Refills: 0 | Status: SHIPPED | OUTPATIENT
Start: 2024-08-05 | End: 2024-08-06 | Stop reason: SDUPTHER

## 2024-08-05 NOTE — TELEPHONE ENCOUNTER
Caller: JESS RUGGIERO    Relationship: Emergency Contact    Best call back number: [phone]     What medication are you requesting: lisdexamfetamine (Vyvanse) 50 MG capsule     What are your current symptoms:     How long have you been experiencing symptoms:     Have you had these symptoms before:    [x] Yes  [] No    Have you been treated for these symptoms before:   [x] Yes  [] No    If a prescription is needed, what is your preferred pharmacy and phone number: [id number] - [place], [place] - [address] - [phone] [facility] [phone] FX     Additional notes: JASEN IS OUT OF THIS MEDICATION AND NEEDS IT SENT TO [facility]. PLEASE CALL AND ADVISE.

## 2024-08-06 DIAGNOSIS — F90.0 ATTENTION DEFICIT HYPERACTIVITY DISORDER (ADHD), PREDOMINANTLY INATTENTIVE TYPE: ICD-10-CM

## 2024-08-06 RX ORDER — LISDEXAMFETAMINE DIMESYLATE 50 MG/1
50 CAPSULE ORAL EVERY MORNING
Qty: 30 CAPSULE | Refills: 0 | Status: SHIPPED | OUTPATIENT
Start: 2024-08-06

## 2024-08-30 ENCOUNTER — APPOINTMENT (OUTPATIENT)
Dept: CT IMAGING | Facility: HOSPITAL | Age: 44
DRG: 322 | End: 2024-08-30
Payer: COMMERCIAL

## 2024-08-30 ENCOUNTER — HOSPITAL ENCOUNTER (INPATIENT)
Facility: HOSPITAL | Age: 44
LOS: 2 days | Discharge: HOME | DRG: 322 | End: 2024-09-01
Attending: EMERGENCY MEDICINE | Admitting: INTERNAL MEDICINE
Payer: COMMERCIAL

## 2024-08-30 DIAGNOSIS — I21.4 SUBENDOCARDIAL MI FIRST EPISODE CARE: ICD-10-CM

## 2024-08-30 DIAGNOSIS — I48.91 ATRIAL FIBRILLATION WITH RAPID VENTRICULAR RESPONSE: ICD-10-CM

## 2024-08-30 DIAGNOSIS — I24.9 ACUTE CORONARY SYNDROME: ICD-10-CM

## 2024-08-30 DIAGNOSIS — R55 SYNCOPE, UNSPECIFIED SYNCOPE TYPE: Primary | ICD-10-CM

## 2024-08-30 PROBLEM — I21.3 STEMI (ST ELEVATION MYOCARDIAL INFARCTION): Status: ACTIVE | Noted: 2024-08-30

## 2024-08-30 PROBLEM — I21.3 STEMI (ST ELEVATION MYOCARDIAL INFARCTION): Status: RESOLVED | Noted: 2024-08-30 | Resolved: 2024-08-30

## 2024-08-30 LAB
ACT BLD: 152 SECONDS (ref 89–137)
ACT BLD: 171 SECONDS (ref 89–137)
ALBUMIN SERPL-MCNC: 4.4 G/DL (ref 3.5–5.2)
ALBUMIN/GLOB SERPL: 1.8 G/DL
ALP SERPL-CCNC: 71 U/L (ref 39–117)
ALT SERPL W P-5'-P-CCNC: 77 U/L (ref 1–41)
ANION GAP SERPL CALCULATED.3IONS-SCNC: 12.2 MMOL/L (ref 5–15)
APTT PPP: 25 SECONDS (ref 61–76.5)
AST SERPL-CCNC: 104 U/L (ref 1–40)
BASOPHILS # BLD AUTO: 0.09 10*3/MM3 (ref 0–0.2)
BASOPHILS NFR BLD AUTO: 0.5 % (ref 0–1.5)
BILIRUB SERPL-MCNC: 0.3 MG/DL (ref 0–1.2)
BUN SERPL-MCNC: 17 MG/DL (ref 6–20)
BUN/CREAT SERPL: 13.6 (ref 7–25)
CALCIUM SPEC-SCNC: 10.4 MG/DL (ref 8.6–10.5)
CHLORIDE SERPL-SCNC: 106 MMOL/L (ref 98–107)
CO2 SERPL-SCNC: 23.8 MMOL/L (ref 22–29)
CREAT SERPL-MCNC: 1.25 MG/DL (ref 0.76–1.27)
D DIMER PPP FEU-MCNC: 0.93 MG/L (FEU) (ref 0–0.5)
DEPRECATED RDW RBC AUTO: 39 FL (ref 37–54)
EGFRCR SERPLBLD CKD-EPI 2021: 72.8 ML/MIN/1.73
EOSINOPHIL # BLD AUTO: 0.24 10*3/MM3 (ref 0–0.4)
EOSINOPHIL NFR BLD AUTO: 1.5 % (ref 0.3–6.2)
ERYTHROCYTE [DISTWIDTH] IN BLOOD BY AUTOMATED COUNT: 12.5 % (ref 12.3–15.4)
GLOBULIN UR ELPH-MCNC: 2.4 GM/DL
GLUCOSE SERPL-MCNC: 135 MG/DL (ref 65–99)
HCT VFR BLD AUTO: 42.1 % (ref 37.5–51)
HGB BLD-MCNC: 14.3 G/DL (ref 13–17.7)
HOLD SPECIMEN: NORMAL
HOLD SPECIMEN: NORMAL
IMM GRANULOCYTES # BLD AUTO: 0.08 10*3/MM3 (ref 0–0.05)
IMM GRANULOCYTES NFR BLD AUTO: 0.5 % (ref 0–0.5)
INR PPP: 0.98 (ref 0.93–1.1)
LYMPHOCYTES # BLD AUTO: 1.61 10*3/MM3 (ref 0.7–3.1)
LYMPHOCYTES NFR BLD AUTO: 9.8 % (ref 19.6–45.3)
MAGNESIUM SERPL-MCNC: 2.1 MG/DL (ref 1.6–2.6)
MCH RBC QN AUTO: 29.1 PG (ref 26.6–33)
MCHC RBC AUTO-ENTMCNC: 34 G/DL (ref 31.5–35.7)
MCV RBC AUTO: 85.7 FL (ref 79–97)
MONOCYTES # BLD AUTO: 0.77 10*3/MM3 (ref 0.1–0.9)
MONOCYTES NFR BLD AUTO: 4.7 % (ref 5–12)
NEUTROPHILS NFR BLD AUTO: 13.67 10*3/MM3 (ref 1.7–7)
NEUTROPHILS NFR BLD AUTO: 83 % (ref 42.7–76)
NRBC BLD AUTO-RTO: 0 /100 WBC (ref 0–0.2)
PLATELET # BLD AUTO: 257 10*3/MM3 (ref 140–450)
PMV BLD AUTO: 10.6 FL (ref 6–12)
POTASSIUM SERPL-SCNC: 3.6 MMOL/L (ref 3.5–5.2)
PROT SERPL-MCNC: 6.8 G/DL (ref 6–8.5)
PROTHROMBIN TIME: 10.7 SECONDS (ref 9.6–11.7)
QT INTERVAL: 366 MS
QT INTERVAL: 380 MS
QTC INTERVAL: 496 MS
QTC INTERVAL: 504 MS
RBC # BLD AUTO: 4.91 10*6/MM3 (ref 4.14–5.8)
SODIUM SERPL-SCNC: 142 MMOL/L (ref 136–145)
TROPONIN T SERPL HS-MCNC: 140 NG/L
WBC NRBC COR # BLD AUTO: 16.46 10*3/MM3 (ref 3.4–10.8)
WHOLE BLOOD HOLD COAG: NORMAL
WHOLE BLOOD HOLD SPECIMEN: NORMAL

## 2024-08-30 PROCEDURE — 80053 COMPREHEN METABOLIC PANEL: CPT | Performed by: EMERGENCY MEDICINE

## 2024-08-30 PROCEDURE — 25010000002 HEPARIN (PORCINE) 25000-0.45 UT/250ML-% SOLUTION: Performed by: EMERGENCY MEDICINE

## 2024-08-30 PROCEDURE — C1894 INTRO/SHEATH, NON-LASER: HCPCS | Performed by: INTERNAL MEDICINE

## 2024-08-30 PROCEDURE — C1874 STENT, COATED/COV W/DEL SYS: HCPCS | Performed by: INTERNAL MEDICINE

## 2024-08-30 PROCEDURE — 85347 COAGULATION TIME ACTIVATED: CPT

## 2024-08-30 PROCEDURE — 25010000002 HEPARIN (PORCINE) PER 1000 UNITS: Performed by: INTERNAL MEDICINE

## 2024-08-30 PROCEDURE — 83735 ASSAY OF MAGNESIUM: CPT | Performed by: EMERGENCY MEDICINE

## 2024-08-30 PROCEDURE — 3E033PZ INTRODUCTION OF PLATELET INHIBITOR INTO PERIPHERAL VEIN, PERCUTANEOUS APPROACH: ICD-10-PCS | Performed by: INTERNAL MEDICINE

## 2024-08-30 PROCEDURE — 25810000003 LACTATED RINGERS SOLUTION: Performed by: EMERGENCY MEDICINE

## 2024-08-30 PROCEDURE — 25010000002 DIPHENHYDRAMINE PER 50 MG: Performed by: INTERNAL MEDICINE

## 2024-08-30 PROCEDURE — 93458 L HRT ARTERY/VENTRICLE ANGIO: CPT | Performed by: INTERNAL MEDICINE

## 2024-08-30 PROCEDURE — 99291 CRITICAL CARE FIRST HOUR: CPT

## 2024-08-30 PROCEDURE — 99153 MOD SED SAME PHYS/QHP EA: CPT | Performed by: INTERNAL MEDICINE

## 2024-08-30 PROCEDURE — 25810000003 SODIUM CHLORIDE 0.9 % SOLUTION: Performed by: INTERNAL MEDICINE

## 2024-08-30 PROCEDURE — 25010000002 NITROGLYCERIN 5 MG/ML SOLUTION: Performed by: INTERNAL MEDICINE

## 2024-08-30 PROCEDURE — C1769 GUIDE WIRE: HCPCS | Performed by: INTERNAL MEDICINE

## 2024-08-30 PROCEDURE — 4A023N7 MEASUREMENT OF CARDIAC SAMPLING AND PRESSURE, LEFT HEART, PERCUTANEOUS APPROACH: ICD-10-PCS | Performed by: INTERNAL MEDICINE

## 2024-08-30 PROCEDURE — C9606 PERC D-E COR REVASC W AMI S: HCPCS | Performed by: INTERNAL MEDICINE

## 2024-08-30 PROCEDURE — 93005 ELECTROCARDIOGRAM TRACING: CPT

## 2024-08-30 PROCEDURE — 99152 MOD SED SAME PHYS/QHP 5/>YRS: CPT | Performed by: INTERNAL MEDICINE

## 2024-08-30 PROCEDURE — 25010000002 ONDANSETRON PER 1 MG: Performed by: INTERNAL MEDICINE

## 2024-08-30 PROCEDURE — 25010000002 MIDAZOLAM PER 1 MG: Performed by: INTERNAL MEDICINE

## 2024-08-30 PROCEDURE — 92941 PRQ TRLML REVSC TOT OCCL AMI: CPT | Performed by: INTERNAL MEDICINE

## 2024-08-30 PROCEDURE — C1725 CATH, TRANSLUMIN NON-LASER: HCPCS | Performed by: INTERNAL MEDICINE

## 2024-08-30 PROCEDURE — 25010000002 PHENYLEPHRINE 10 MG/ML SOLUTION: Performed by: INTERNAL MEDICINE

## 2024-08-30 PROCEDURE — 25010000002 EPTIFIBATIDE 20 MG/10ML SOLUTION: Performed by: INTERNAL MEDICINE

## 2024-08-30 PROCEDURE — 85610 PROTHROMBIN TIME: CPT | Performed by: EMERGENCY MEDICINE

## 2024-08-30 PROCEDURE — 027034Z DILATION OF CORONARY ARTERY, ONE ARTERY WITH DRUG-ELUTING INTRALUMINAL DEVICE, PERCUTANEOUS APPROACH: ICD-10-PCS | Performed by: INTERNAL MEDICINE

## 2024-08-30 PROCEDURE — 93005 ELECTROCARDIOGRAM TRACING: CPT | Performed by: EMERGENCY MEDICINE

## 2024-08-30 PROCEDURE — 25010000002 NICARDIPINE 2.5 MG/ML SOLUTION: Performed by: INTERNAL MEDICINE

## 2024-08-30 PROCEDURE — 85025 COMPLETE CBC W/AUTO DIFF WBC: CPT | Performed by: EMERGENCY MEDICINE

## 2024-08-30 PROCEDURE — 25510000001 IOPAMIDOL PER 1 ML: Performed by: INTERNAL MEDICINE

## 2024-08-30 PROCEDURE — 25010000002 EPTIFIBATIDE PER 5 MG: Performed by: INTERNAL MEDICINE

## 2024-08-30 PROCEDURE — 25010000002 FENTANYL CITRATE (PF) 100 MCG/2ML SOLUTION: Performed by: INTERNAL MEDICINE

## 2024-08-30 PROCEDURE — 99223 1ST HOSP IP/OBS HIGH 75: CPT | Performed by: INTERNAL MEDICINE

## 2024-08-30 PROCEDURE — B2111ZZ FLUOROSCOPY OF MULTIPLE CORONARY ARTERIES USING LOW OSMOLAR CONTRAST: ICD-10-PCS | Performed by: INTERNAL MEDICINE

## 2024-08-30 PROCEDURE — 85379 FIBRIN DEGRADATION QUANT: CPT | Performed by: EMERGENCY MEDICINE

## 2024-08-30 PROCEDURE — 84484 ASSAY OF TROPONIN QUANT: CPT | Performed by: EMERGENCY MEDICINE

## 2024-08-30 PROCEDURE — C1887 CATHETER, GUIDING: HCPCS | Performed by: INTERNAL MEDICINE

## 2024-08-30 PROCEDURE — 85730 THROMBOPLASTIN TIME PARTIAL: CPT | Performed by: EMERGENCY MEDICINE

## 2024-08-30 DEVICE — XIENCE SKYPOINT™ EVEROLIMUS ELUTING CORONARY STENT SYSTEM 3.50 MM X 18 MM / RAPID-EXCHANGE
Type: IMPLANTABLE DEVICE | Status: FUNCTIONAL
Brand: XIENCE SKYPOINT™

## 2024-08-30 RX ORDER — ASPIRIN 81 MG/1
81 TABLET ORAL DAILY
Status: DISCONTINUED | OUTPATIENT
Start: 2024-08-31 | End: 2024-09-01 | Stop reason: HOSPADM

## 2024-08-30 RX ORDER — ATORVASTATIN CALCIUM 40 MG/1
40 TABLET, FILM COATED ORAL NIGHTLY
Status: DISCONTINUED | OUTPATIENT
Start: 2024-08-30 | End: 2024-09-01 | Stop reason: HOSPADM

## 2024-08-30 RX ORDER — NITROGLYCERIN 5 MG/ML
INJECTION, SOLUTION INTRAVENOUS
Status: DISCONTINUED | OUTPATIENT
Start: 2024-08-30 | End: 2024-08-30 | Stop reason: HOSPADM

## 2024-08-30 RX ORDER — ONDANSETRON 2 MG/ML
INJECTION INTRAMUSCULAR; INTRAVENOUS
Status: DISCONTINUED | OUTPATIENT
Start: 2024-08-30 | End: 2024-08-30 | Stop reason: HOSPADM

## 2024-08-30 RX ORDER — ONDANSETRON 2 MG/ML
4 INJECTION INTRAMUSCULAR; INTRAVENOUS EVERY 6 HOURS PRN
Status: DISCONTINUED | OUTPATIENT
Start: 2024-08-30 | End: 2024-09-01 | Stop reason: HOSPADM

## 2024-08-30 RX ORDER — ONDANSETRON 4 MG/1
4 TABLET, ORALLY DISINTEGRATING ORAL EVERY 6 HOURS PRN
Status: DISCONTINUED | OUTPATIENT
Start: 2024-08-30 | End: 2024-09-01 | Stop reason: HOSPADM

## 2024-08-30 RX ORDER — FENTANYL CITRATE 50 UG/ML
INJECTION, SOLUTION INTRAMUSCULAR; INTRAVENOUS
Status: DISCONTINUED | OUTPATIENT
Start: 2024-08-30 | End: 2024-08-30 | Stop reason: HOSPADM

## 2024-08-30 RX ORDER — MIDAZOLAM HYDROCHLORIDE 1 MG/ML
INJECTION INTRAMUSCULAR; INTRAVENOUS
Status: DISCONTINUED | OUTPATIENT
Start: 2024-08-30 | End: 2024-08-30 | Stop reason: HOSPADM

## 2024-08-30 RX ORDER — EPTIFIBATIDE 0.75 MG/ML
2 INJECTION, SOLUTION INTRAVENOUS CONTINUOUS
Status: DISPENSED | OUTPATIENT
Start: 2024-08-30 | End: 2024-08-31

## 2024-08-30 RX ORDER — NITROGLYCERIN 0.4 MG/1
0.4 TABLET SUBLINGUAL
Status: DISCONTINUED | OUTPATIENT
Start: 2024-08-30 | End: 2024-09-01 | Stop reason: HOSPADM

## 2024-08-30 RX ORDER — DILTIAZEM HYDROCHLORIDE 5 MG/ML
10 INJECTION INTRAVENOUS ONCE
Status: DISCONTINUED | OUTPATIENT
Start: 2024-08-30 | End: 2024-08-31

## 2024-08-30 RX ORDER — DIPHENHYDRAMINE HYDROCHLORIDE 50 MG/ML
INJECTION INTRAMUSCULAR; INTRAVENOUS
Status: DISCONTINUED | OUTPATIENT
Start: 2024-08-30 | End: 2024-08-30 | Stop reason: HOSPADM

## 2024-08-30 RX ORDER — SODIUM CHLORIDE 9 MG/ML
INJECTION, SOLUTION INTRAVENOUS
Status: COMPLETED | OUTPATIENT
Start: 2024-08-30 | End: 2024-08-30

## 2024-08-30 RX ORDER — NICARDIPINE HYDROCHLORIDE 2.5 MG/ML
INJECTION INTRAVENOUS
Status: DISCONTINUED | OUTPATIENT
Start: 2024-08-30 | End: 2024-08-30 | Stop reason: HOSPADM

## 2024-08-30 RX ORDER — EPTIFIBATIDE 2 MG/ML
INJECTION, SOLUTION INTRAVENOUS
Status: DISCONTINUED | OUTPATIENT
Start: 2024-08-30 | End: 2024-08-30 | Stop reason: HOSPADM

## 2024-08-30 RX ORDER — PHENYLEPHRINE HYDROCHLORIDE 10 MG/ML
INJECTION INTRAVENOUS
Status: DISCONTINUED | OUTPATIENT
Start: 2024-08-30 | End: 2024-08-30 | Stop reason: HOSPADM

## 2024-08-30 RX ORDER — ACETAMINOPHEN 325 MG/1
650 TABLET ORAL EVERY 4 HOURS PRN
Status: DISCONTINUED | OUTPATIENT
Start: 2024-08-30 | End: 2024-09-01 | Stop reason: HOSPADM

## 2024-08-30 RX ORDER — IOPAMIDOL 755 MG/ML
INJECTION, SOLUTION INTRAVASCULAR
Status: DISCONTINUED | OUTPATIENT
Start: 2024-08-30 | End: 2024-08-30 | Stop reason: HOSPADM

## 2024-08-30 RX ORDER — HEPARIN SODIUM 1000 [USP'U]/ML
INJECTION, SOLUTION INTRAVENOUS; SUBCUTANEOUS
Status: DISCONTINUED | OUTPATIENT
Start: 2024-08-30 | End: 2024-08-30 | Stop reason: HOSPADM

## 2024-08-30 RX ORDER — EPTIFIBATIDE 0.75 MG/ML
INJECTION, SOLUTION INTRAVENOUS
Status: COMPLETED | OUTPATIENT
Start: 2024-08-30 | End: 2024-08-30

## 2024-08-30 RX ORDER — ALUMINA, MAGNESIA, AND SIMETHICONE 2400; 2400; 240 MG/30ML; MG/30ML; MG/30ML
15 SUSPENSION ORAL EVERY 6 HOURS PRN
Status: DISCONTINUED | OUTPATIENT
Start: 2024-08-30 | End: 2024-09-01 | Stop reason: HOSPADM

## 2024-08-30 RX ORDER — HEPARIN SODIUM 10000 [USP'U]/100ML
8.13 INJECTION, SOLUTION INTRAVENOUS
Status: DISCONTINUED | OUTPATIENT
Start: 2024-08-30 | End: 2024-08-30

## 2024-08-30 RX ORDER — SODIUM CHLORIDE 0.9 % (FLUSH) 0.9 %
10 SYRINGE (ML) INJECTION AS NEEDED
Status: DISCONTINUED | OUTPATIENT
Start: 2024-08-30 | End: 2024-09-01 | Stop reason: HOSPADM

## 2024-08-30 RX ORDER — DILTIAZEM HCL/D5W 125 MG/125
5-15 PLASTIC BAG, INJECTION (ML) INTRAVENOUS
Status: DISCONTINUED | OUTPATIENT
Start: 2024-08-30 | End: 2024-08-31

## 2024-08-30 RX ORDER — LIDOCAINE HYDROCHLORIDE 20 MG/ML
INJECTION, SOLUTION INFILTRATION; PERINEURAL
Status: DISCONTINUED | OUTPATIENT
Start: 2024-08-30 | End: 2024-08-30 | Stop reason: HOSPADM

## 2024-08-30 RX ORDER — ASPIRIN 81 MG/1
TABLET, CHEWABLE ORAL
Status: DISCONTINUED | OUTPATIENT
Start: 2024-08-30 | End: 2024-08-30 | Stop reason: HOSPADM

## 2024-08-30 RX ORDER — SODIUM CHLORIDE 9 MG/ML
250 INJECTION, SOLUTION INTRAVENOUS ONCE AS NEEDED
Status: DISCONTINUED | OUTPATIENT
Start: 2024-08-30 | End: 2024-09-01 | Stop reason: HOSPADM

## 2024-08-30 RX ADMIN — ONDANSETRON 4 MG: 2 INJECTION INTRAMUSCULAR; INTRAVENOUS at 18:25

## 2024-08-30 RX ADMIN — ATORVASTATIN CALCIUM 40 MG: 40 TABLET, FILM COATED ORAL at 20:33

## 2024-08-30 RX ADMIN — EPTIFIBATIDE 2 MCG/KG/MIN: 0.75 INJECTION INTRAVENOUS at 18:13

## 2024-08-30 RX ADMIN — HEPARIN SODIUM 8.13 UNITS/KG/HR: 10000 INJECTION, SOLUTION INTRAVENOUS at 14:38

## 2024-08-30 RX ADMIN — SODIUM CHLORIDE, POTASSIUM CHLORIDE, SODIUM LACTATE AND CALCIUM CHLORIDE 1000 ML: 600; 310; 30; 20 INJECTION, SOLUTION INTRAVENOUS at 13:51

## 2024-08-30 NOTE — Clinical Note
First balloon inflation max pressure = 10 isabella. First balloon inflation duration = 6 seconds. Second inflation of balloon - Max pressure = 10 isabella. 2nd Inflation of balloon - Duration = 10 seconds.

## 2024-08-30 NOTE — PLAN OF CARE
Goal Outcome Evaluation:               Patient collapsed at work, shocked and CPR for about 2 minutes - ROSC obtained. Cath lab today with stent to RCA. Another stent planned in the future. Arrived to [facility] at 1610. Currently in Afib with no complaint of chest pain. Plan of care ongoing.

## 2024-08-30 NOTE — ED PROVIDER NOTES
Subjective   History of Present Illness  Chief complaint passed out    History of present illness this is a 44-year-old gentleman with a history of hypertension who states he has been having some heartburn for off-and-on for couple weeks last night he had some burning in his chest that lasted for several hours she was able to sleep and went into his neck there was no shortness of breath.  He was able go to work today he states he had no chest pain or heartburn work he worked all day was almost done with his shift when he reportedly passed out.  He does not remember this part.  Reported bystander said there were no pulse he had CPR established an AED was placed that he received a shock according to EMS.  Patient woke up and was transported to the hospital for further evaluation.  He denies any chest pain neck arm jaw pain maybe a little bit of shortness of breath currently.  No recent injury illness flus viruses vaccinations.  He states he worked all day today without chest pain or breathing problems.  No recent long car ride plane or immobilization foreign travels antibiotic use leg pain or swelling.  He has no known heart problems but does run in his family.      Review of Systems   Constitutional:  Negative for chills and fever.   Respiratory:  Positive for shortness of breath. Negative for chest tightness.    Cardiovascular:  Negative for chest pain, palpitations and leg swelling.   Gastrointestinal:  Negative for abdominal pain and vomiting.   Musculoskeletal:  Negative for back pain and neck pain.   Skin:  Negative for rash.   Neurological:  Positive for syncope. Negative for seizures and facial asymmetry.       Past Medical History:   Diagnosis Date    Biceps rupture, distal     Chondromalacia, patella     Elbow fracture, left     Essential (primary) hypertension 10/11/2017    Hearing loss     Obesity 12/21/2015       No Known Allergies    Past Surgical History:   Procedure Laterality Date    DISTAL BICEPS  TENDON REPAIR Right 2/4/2020    Procedure: TENDON DISTAL BICEPS REPAIR;  Surgeon: Richard Berger MD;  Location: [facility] MAIN OR;  Service: Orthopedics;  Laterality: Right;    EAR TUBES      TONSILLECTOMY         Family History   Problem Relation Age of Onset    Cancer Mother         Ovarian    Cancer Father         Prostate    Coronary artery disease Father        Social History     Socioeconomic History    Marital status:    Tobacco Use    Smoking status: Former     Types: Electronic Cigarette     Quit date: 2014     Years since quitting: 10.6     Passive exposure: Past    Smokeless tobacco: Never    Tobacco comments:     10-15 times per day   Vaping Use    Vaping status: Former    Quit date: 6/14/2019   Substance and Sexual Activity    Alcohol use: Not Currently     Comment: rare  2 times per year    Drug use: Never    Sexual activity: Yes     Partners: Female     Birth control/protection: Ring     Prior to Admission medications    Medication Sig Start Date End Date Taking? Authorizing Provider   lisdexamfetamine (Vyvanse) 50 MG capsule Take 1 capsule by mouth Every Morning 8/6/24  Yes Gaetano Zhu PA-C          Objective   Physical Exam  Constitutional this is a 44-year-old gentleman awake alert in no acute distress resting comfortably triage vital signs reviewed.  HEENT extraocular muscles are intact pupils equal round reactive sclera clear no photophobia neck supple no adenopathy no JV no bruits lungs clear no retraction no use of accessories.  Heart regular without murmur or rub.  Abdomen soft nontender good bowel sounds no peritoneal findings or pulsatile masses extremities pulses equal upper and lower extremities no edema cords or Homans' sign no evidence of DVT skin is warm and dry without rashes or cellulitic changes neurologic awake alert and orientated x 4 no face asymmetry speech normal no focal weakness.  Procedures           ED Course      Results for orders placed or performed  during the hospital encounter of 08/30/24   Comprehensive Metabolic Panel    Specimen: Blood   Result Value Ref Range    Glucose 135 (H) 65 - 99 mg/dL    BUN 17 6 - 20 mg/dL    Creatinine 1.25 0.76 - 1.27 mg/dL    Sodium 142 136 - 145 mmol/L    Potassium 3.6 3.5 - 5.2 mmol/L    Chloride 106 98 - 107 mmol/L    CO2 23.8 22.0 - 29.0 mmol/L    Calcium 10.4 8.6 - 10.5 mg/dL    Total Protein 6.8 6.0 - 8.5 g/dL    Albumin 4.4 3.5 - 5.2 g/dL    ALT (SGPT) 77 (H) 1 - 41 U/L    AST (SGOT) 104 (H) 1 - 40 U/L    Alkaline Phosphatase 71 39 - 117 U/L    Total Bilirubin 0.3 0.0 - 1.2 mg/dL    Globulin 2.4 gm/dL    A/G Ratio 1.8 g/dL    BUN/Creatinine Ratio 13.6 7.0 - 25.0    Anion Gap 12.2 5.0 - 15.0 mmol/L    eGFR 72.8 >60.0 mL/min/1.73   Magnesium    Specimen: Blood   Result Value Ref Range    Magnesium 2.1 1.6 - 2.6 mg/dL   High Sensitivity Troponin T    Specimen: Blood   Result Value Ref Range    HS Troponin T 140 (C) <22 ng/L   CBC Auto Differential    Specimen: Blood   Result Value Ref Range    WBC 16.46 (H) 3.40 - 10.80 10*3/mm3    RBC 4.91 4.14 - 5.80 10*6/mm3    Hemoglobin 14.3 13.0 - 17.7 g/dL    Hematocrit 42.1 37.5 - 51.0 %    MCV 85.7 79.0 - 97.0 fL    MCH 29.1 26.6 - 33.0 pg    MCHC 34.0 31.5 - 35.7 g/dL    RDW 12.5 12.3 - 15.4 %    RDW-SD 39.0 37.0 - 54.0 fl    MPV 10.6 6.0 - 12.0 fL    Platelets 257 140 - 450 10*3/mm3    Neutrophil % 83.0 (H) 42.7 - 76.0 %    Lymphocyte % 9.8 (L) 19.6 - 45.3 %    Monocyte % 4.7 (L) 5.0 - 12.0 %    Eosinophil % 1.5 0.3 - 6.2 %    Basophil % 0.5 0.0 - 1.5 %    Immature Grans % 0.5 0.0 - 0.5 %    Neutrophils, Absolute 13.67 (H) 1.70 - 7.00 10*3/mm3    Lymphocytes, Absolute 1.61 0.70 - 3.10 10*3/mm3    Monocytes, Absolute 0.77 0.10 - 0.90 10*3/mm3    Eosinophils, Absolute 0.24 0.00 - 0.40 10*3/mm3    Basophils, Absolute 0.09 0.00 - 0.20 10*3/mm3    Immature Grans, Absolute 0.08 (H) 0.00 - 0.05 10*3/mm3    nRBC 0.0 0.0 - 0.2 /100 WBC   D-dimer, Quantitative    Specimen: Blood   Result  Value Ref Range    D-Dimer, Quantitative 0.93 (H) 0.00 - 0.50 mg/L (FEU)   Protime-INR    Specimen: Blood   Result Value Ref Range    Protime 10.7 9.6 - 11.7 Seconds    INR 0.98 0.93 - 1.10   aPTT    Specimen: Blood   Result Value Ref Range    PTT 25.0 (L) 61.0 - 76.5 seconds   POC Activated Clotting Time    Specimen: Arterial Blood   Result Value Ref Range    Activated Clotting Time  171 (H) 89 - 137 Seconds   POC Activated Clotting Time    Specimen: Arterial Blood   Result Value Ref Range    Activated Clotting Time  152 (H) 89 - 137 Seconds   ECG 12 Lead Chest Pain   Result Value Ref Range    QT Interval 392 ms    QTC Interval 534 ms   ECG 12 Lead Rhythm Change   Result Value Ref Range    QT Interval 366 ms    QTC Interval 504 ms   ECG 12 Lead Tachycardia   Result Value Ref Range    QT Interval 380 ms    QTC Interval 496 ms   Green Top (Gel)   Result Value Ref Range    Extra Tube Hold for add-ons.    Lavender Top   Result Value Ref Range    Extra Tube hold for add-on    Gold Top - SST   Result Value Ref Range    Extra Tube Hold for add-ons.    Light Blue Top   Result Value Ref Range    Extra Tube Hold for add-ons.      No radiology results for the last day  Medications   sodium chloride 0.9 % flush 10 mL ( Intravenous MAR Unhold 8/30/24 1704)   dilTIAZem (CARDIZEM) injection 10 mg (10 mg Intravenous Not Given 8/30/24 1405)   dilTIAZem (CARDIZEM) 125 mg in 125 mL D5W infusion ( Intravenous Not Given 8/30/24 1405)   nitroglycerin (NITROSTAT) SL tablet 0.4 mg (has no administration in time range)   atropine sulfate injection 0.5 mg (0.5 mg Intravenous Not Given 8/30/24 2023)   sodium chloride 0.9 % infusion 250 mL (has no administration in time range)   acetaminophen (TYLENOL) tablet 650 mg (has no administration in time range)   aluminum-magnesium hydroxide-simethicone (MAALOX MAX) 400-400-40 MG/5ML suspension 15 mL (has no administration in time range)   ondansetron ODT (ZOFRAN-ODT) disintegrating tablet 4 mg (  Oral Not Given:  See Alt 8/30/24 1825)     Or   ondansetron (ZOFRAN) injection 4 mg (4 mg Intravenous Given 8/30/24 1825)   atorvastatin (LIPITOR) tablet 40 mg (40 mg Oral Given 8/30/24 2033)   ticagrelor (BRILINTA) tablet 90 mg (has no administration in time range)   aspirin EC tablet 81 mg (has no administration in time range)   eptifibatide (INTEGRILIN) 75 mg in 100 mL solution (0 mcg/kg/min × 123 kg Intravenous Stopped 8/30/24 2350)   lactated ringers bolus 1,000 mL (1,000 mL Intravenous New Bag 8/30/24 1351)   heparin bolus from bag solution 5,000 Units (5,000 Units Intravenous Bolus from Bag 8/30/24 1441)   sodium chloride 0.9 % infusion (75 mL/hr Intravenous Canceled Entry 8/30/24 1526)   sodium chloride 0.9 % bolus (250 mL Intravenous New Bag 8/30/24 1528)   eptifibatide (INTEGRILIN) 75 mg in 100 mL solution (2 mcg/kg/min × 123 kg Intravenous New Bag 8/30/24 1538)                                    EKG my interpretation atrial fibrillation rate of 110 nonspecific ST changes noted diffusely no acute ST elevation normal axis no hypertrophy abnormal EKG changed from previous sinus rhythm on 1/31/2020.  EKG #2 my interpretation atrial fibrillation rate of 100 normal axis hypertrophy.  Patient has some Q waves noted inferiorly in lead III but no acute ST elevation unchanged from the early 1 other than slower rate.          Medical Decision Making  Medical decision making.  Patient IV established monitor placement review shows atrial fibrillation with rapid ventricular spots about 115.  EKG my interpretation atrial fibrillation rate of 110 nonspecific ST changes noted throughout the inferior leads.  The patient has an abnormal EKG and it is changed from a sinus rhythm on 1/31/2020.  At this point because of the patient being shocked and his EKG I took some pictures and sent this to the on-call interventionalists Dr. Arita.  He reviewed the EKG.  And after discussion we will get labs and the patient has no current  chest pain and is resting comfortably and he will see the patient.  I was going to start Cardizem on the patient but heart rate improved on its own came down to 100 still in atrial fibrillation on repeat EKG my interpretation atrial fibrillation rate of 100 nonspecific changes noted inferior leads with ST segments but really no change from previous other than the slower rate.  This has been reviewed by cardiology as well.  Patient had been given a liter lactated ringer bolus.  He then given aspirin prior to arrival labs obtained my independent review comprehensive metabolic profile unremarkable finding was elevated at 140 CBC white count 16.4.  Cardiologist Dr. Arita is here in the ER seeing the patient the patient was started on weight-based heparin protocol.  He has no chest pain.  Nonspecific findings on his EKG he is in atrial fibrillation but currently controlled rate.  He has been reportedly shocked by an AED at work.  He has had no chest pain today.  Nonspecific symptoms for the last couple weeks.  I do not see any evidence here that suggest an acute DVT or pulmonary embolism or aortic dissection D-dimer mildly elevated.  But I suspect this is cardiac related.  He initially was going to a CT scan but cardiology will take the patient to the lab first.  I do not see any evidence that suggest meningitis encephalitis or stroke I do not see any evidence that suggest acute infectious process sepsis or bacteremia based on the history and physical and clinical findings.  He will go to the Cath Lab he is on heparin he is stable and pain-free.  Patient agreeable and made aware of the findings.  Critical care 30 minutes    Problems Addressed:  Acute coronary syndrome: complicated acute illness or injury  Atrial fibrillation with rapid ventricular response: complicated acute illness or injury  Subendocardial MI first episode care: complicated acute illness or injury  Syncope, unspecified syncope type: complicated acute  illness or injury    Amount and/or Complexity of Data Reviewed  Labs: ordered. Decision-making details documented in ED Course.  Radiology: ordered and independent interpretation performed. Decision-making details documented in ED Course.  ECG/medicine tests: ordered and independent interpretation performed. Decision-making details documented in ED Course.    Risk  Drug therapy requiring intensive monitoring for toxicity.        Final diagnoses:   Syncope, unspecified syncope type   Acute coronary syndrome   Atrial fibrillation with rapid ventricular response   Subendocardial MI first episode care       ED Disposition  ED Disposition       ED Disposition   Send to Cath Lab    Condition   --    Comment   --               No follow-up provider specified.       Medication List      No changes were made to your prescriptions during this visit.            Manuel Church MD  08/31/24 0024

## 2024-08-30 NOTE — Clinical Note
First balloon inflation max pressure = 9 isabella. First balloon inflation duration = 5 seconds. Second inflation of balloon - Max pressure = 10 isabella. 2nd Inflation of balloon - Duration = 6 seconds.

## 2024-08-30 NOTE — CONSULTS
Referring Provider: Dr. Manuel Church  Reason for Consultation: External AED shock and abnormal EKG    Chief complaint syncope    Cardiology assessment and plan      Acute coronary syndrome  Shocked by external AED  Abnormal EKG  Intermittent heartburn for 2 weeks with some symptoms last night and this morning  Abnormal EKG with inferior Q waves and nonspecific ST changes  Syncope  Newly diagnosed atrial fibrillation  Abnormal troponin    Plans to take patient to the Cath Lab emergently for further evaluation and treatment options  Risk benefits and alternatives reviewed and discussed with patient and family            History of present illness:  Scar Velásquez is a 44 y.o. male who presents with past medical history that is significant for history of obesity hyperlipidemia family history of coronary artery disease and abnormal coronary artery calcium score presented with an episode of syncope at work with some discomfort that he describes as heartburn and then because patient did not had any pulse AED was placed at workplace and patient received discharge from the ED device  Patient was seen in the emergency room though EKG changes have been nonspecific and patient's symptoms are somewhat nonspecific and patient looks stable plan to proceed with emergent cardiac catheterization for further evaluation and treatment options and risk benefits and alternatives reviewed and discussed patient and family        Review of Systems  Review of Systems   Constitutional: Negative for chills, decreased appetite and malaise/fatigue.   HENT:  Negative for congestion and nosebleeds.    Eyes:  Negative for blurred vision and double vision.   Cardiovascular:  Positive for chest pain. Negative for dyspnea on exertion, irregular heartbeat, leg swelling, near-syncope, orthopnea and palpitations.   Respiratory:  Negative for cough and shortness of breath.    Hematologic/Lymphatic: Negative for adenopathy. Does not bruise/bleed easily.    Skin:  Negative for color change and rash.   Musculoskeletal:  Negative for back pain and joint pain.   Gastrointestinal:  Negative for bloating, abdominal pain, hematemesis and hematochezia.   Genitourinary:  Negative for flank pain and hematuria.   Neurological:  Negative for dizziness and focal weakness.   Psychiatric/Behavioral:  Negative for altered mental status. The patient does not have insomnia.        Past Medical History  Past Medical History:   Diagnosis Date    Biceps rupture, distal     Chondromalacia, patella     Elbow fracture, left     Essential (primary) hypertension 10/11/2017    Hearing loss     Obesity 12/21/2015    and   Past Surgical History:   Procedure Laterality Date    DISTAL BICEPS TENDON REPAIR Right 2/4/2020    Procedure: TENDON DISTAL BICEPS REPAIR;  Surgeon: Richard Berger MD;  Location: [facility] MAIN OR;  Service: Orthopedics;  Laterality: Right;    EAR TUBES      TONSILLECTOMY         Family History  Family History   Problem Relation Age of Onset    Cancer Mother         Ovarian    Cancer Father         Prostate    Coronary artery disease Father        Social History  Social History     Socioeconomic History    Marital status:    Tobacco Use    Smoking status: Former     Types: Electronic Cigarette     Quit date: 2014     Years since quitting: 10.6     Passive exposure: Past    Smokeless tobacco: Never    Tobacco comments:     10-15 times per day   Vaping Use    Vaping status: Former    Quit date: 6/14/2019   Substance and Sexual Activity    Alcohol use: Not Currently     Comment: rare  2 times per year    Drug use: Never    Sexual activity: Yes     Partners: Female     Birth control/protection: Ring       Objective     Physical Exam:  Constitutional:       Appearance: Well-developed.   Eyes:      Conjunctiva/sclera: Conjunctivae normal.      Pupils: Pupils are equal, round, and reactive to light.   HENT:      Head: Normocephalic and atraumatic.   Neck:      Thyroid:  "No thyromegaly.   Pulmonary:      Effort: Pulmonary effort is normal.      Breath sounds: Normal breath sounds.   Cardiovascular:      Normal rate. Regular rhythm.   Pulses:     Intact distal pulses.   Edema:     Peripheral edema absent.   Abdominal:      General: Bowel sounds are normal.      Palpations: Abdomen is soft.   Musculoskeletal:      Cervical back: Normal range of motion and neck supple. Skin:     General: Skin is warm.   Neurological:      Mental Status: Alert and oriented to person, place, and time.         Vital Signs  Vitals:    08/30/24 1346 08/30/24 1349 08/30/24 1402 08/30/24 1404   BP: 120/85  126/81    BP Location:       Patient Position:       Pulse:  98  97   Resp:       Temp:       TempSrc:       SpO2:  98%  100%   Weight:       Height:           Weight  Flowsheet Rows      Flowsheet Row First Filed Value   Admission Height 185.4 cm (73\") Documented at 08/30/2024 1328   Admission Weight 123 kg (272 lb) Documented at 08/30/2024 1328                Results Review:  Lab Results (last 24 hours)       Procedure Component Value Units Date/Time    High Sensitivity Troponin T [[id number]]  (Abnormal) Collected: 08/30/24 1329    Specimen: Blood Updated: 08/30/24 1407     HS Troponin T 140 ng/L     Narrative:      High Sensitive Troponin T Reference Range:  <14.0 ng/L- Negative Female for AMI  <22.0 ng/L- Negative Male for AMI  >=14 - Abnormal Female indicating possible myocardial injury.  >=22 - Abnormal Male indicating possible myocardial injury.   Clinicians would have to utilize clinical acumen, EKG, Troponin, and serial changes to determine if it is an Acute Myocardial Infarction or myocardial injury due to an underlying chronic condition.         D-dimer, Quantitative [[id number]]  (Abnormal) Collected: 08/30/24 1329    Specimen: Blood Updated: 08/30/24 1404     D-Dimer, Quantitative 0.93 mg/L (FEU)     Narrative:      According to the assay 's published package insert, a normal (<0.50 " "mg/L (FEU)) D-dimer result in conjunction with a non-high clinical probability assessment, excludes deep vein thrombosis (DVT) and pulmonary embolism (PE) with high sensitivity.    D-dimer values increase with age and this can make VTE exclusion of an older population difficult. To address this, the [employer] College [employer], based on best available evidence and recent guidelines, recommends that clinicians use age-adjusted D-dimer thresholds in patients greater than 50 years of age with: a) a low probability of PE who do not meet all Pulmonary Embolism Rule Out Criteria, or b) in those with intermediate probability of PE.   The formula for an age-adjusted D-dimer cut-off is \"age/100\".  For example, a 60 year old patient would have an age-adjusted cut-off of 0.60 mg/L (FEU) and an 80 year old 0.80 mg/L (FEU).    Magnesium [[id number]]  (Normal) Collected: 08/30/24 1329    Specimen: Blood Updated: 08/30/24 1404     Magnesium 2.1 mg/dL     Comprehensive Metabolic Panel [[id number]]  (Abnormal) Collected: 08/30/24 1329    Specimen: Blood Updated: 08/30/24 1404     Glucose 135 mg/dL      BUN 17 mg/dL      Creatinine 1.25 mg/dL      Sodium 142 mmol/L      Potassium 3.6 mmol/L      Chloride 106 mmol/L      CO2 23.8 mmol/L      Calcium 10.4 mg/dL      Total Protein 6.8 g/dL      Albumin 4.4 g/dL      ALT (SGPT) 77 U/L      AST (SGOT) 104 U/L      Alkaline Phosphatase 71 U/L      Total Bilirubin 0.3 mg/dL      Globulin 2.4 gm/dL      A/G Ratio 1.8 g/dL      BUN/Creatinine Ratio 13.6     Anion Gap 12.2 mmol/L      eGFR 72.8 mL/min/1.73     Narrative:      GFR Normal >60  Chronic Kidney Disease <60  Kidney Failure <15      [place] Draw [[id number]] Collected: 08/30/24 1329    Specimen: Blood Updated: 08/30/24 1346    Narrative:      The following orders were created for panel order [place] Draw.  Procedure                               Abnormality         Status                     ---------                               " -----------         ------                     Green Top (Gel)[[id number]]                                  Final result               Lavender Top[[id number]]                                     Final result               Gold Top - SST[[id number]]                                   Final result               Light Blue Top[[id number]]                                   Final result                 Please view results for these tests on the individual orders.    Green Top (Gel) [[id number]] Collected: 08/30/24 1329    Specimen: Blood Updated: 08/30/24 1346     Extra Tube Hold for add-ons.     Comment: Auto resulted.       Lavender Top [[id number]] Collected: 08/30/24 1329    Specimen: Blood Updated: 08/30/24 1346     Extra Tube hold for add-on     Comment: Auto resulted       Gold Top - SST [[id number]] Collected: 08/30/24 1329    Specimen: Blood Updated: 08/30/24 1346     Extra Tube Hold for add-ons.     Comment: Auto resulted.       Light Blue Top [[id number]] Collected: 08/30/24 1329    Specimen: Blood Updated: 08/30/24 1346     Extra Tube Hold for add-ons.     Comment: Auto resulted       CBC & Differential [[id number]]  (Abnormal) Collected: 08/30/24 1329    Specimen: Blood Updated: 08/30/24 1339    Narrative:      The following orders were created for panel order CBC & Differential.  Procedure                               Abnormality         Status                     ---------                               -----------         ------                     CBC Auto Differential[[id number]]        Abnormal            Final result                 Please view results for these tests on the individual orders.    CBC Auto Differential [[id number]]  (Abnormal) Collected: 08/30/24 1329    Specimen: Blood Updated: 08/30/24 1339     WBC 16.46 10*3/mm3      RBC 4.91 10*6/mm3      Hemoglobin 14.3 g/dL      Hematocrit 42.1 %      MCV 85.7 fL      MCH 29.1 pg      MCHC 34.0 g/dL      RDW 12.5 %      RDW-SD 39.0 fl      MPV 10.6 fL       Platelets 257 10*3/mm3      Neutrophil % 83.0 %      Lymphocyte % 9.8 %      Monocyte % 4.7 %      Eosinophil % 1.5 %      Basophil % 0.5 %      Immature Grans % 0.5 %      Neutrophils, Absolute 13.67 10*3/mm3      Lymphocytes, Absolute 1.61 10*3/mm3      Monocytes, Absolute 0.77 10*3/mm3      Eosinophils, Absolute 0.24 10*3/mm3      Basophils, Absolute 0.09 10*3/mm3      Immature Grans, Absolute 0.08 10*3/mm3      nRBC 0.0 /100 WBC           Imaging Results (Last 72 Hours)       ** No results found for the last 72 hours. **                Medication Review  Scheduled Meds:dilTIAZem, 10 mg, Intravenous, Once  heparin, 5,000 Units, Intravenous, Once      Continuous Infusions:dilTIAZem, 5-15 mg/hr  heparin, 8.13 Units/kg/hr      PRN Meds:.  heparin    heparin    [COMPLETED] Insert Peripheral IV **AND** sodium chloride    Lab Results   Component Value Date    GLUCOSE 135 (H) 08/30/2024    BUN 17 08/30/2024    CREATININE 1.25 08/30/2024    EGFR 72.8 08/30/2024    BCR 13.6 08/30/2024    K 3.6 08/30/2024    CO2 23.8 08/30/2024    CALCIUM 10.4 08/30/2024    ALBUMIN 4.4 08/30/2024    BILITOT 0.3 08/30/2024     (H) 08/30/2024    ALT 77 (H) 08/30/2024     No results found for this or any previous visit.        Lab Results   Component Value Date    CHOL 245 (H) 05/06/2024    TRIG 129 05/06/2024    HDL 30 (L) 05/06/2024     (H) 05/06/2024            Assessment & Plan       * No active hospital problems. *          Stef Hercules MD  08/30/24  14:24 EDT

## 2024-08-30 NOTE — H&P
"Critical Care History and Physical     Scar Velásquez : 1980 MRN:[id number] LOS:0 ROOM: 3108/1     Reason for admission: Acute coronary syndrome     Assessment / Plan     Acute coronary syndrome  Shocked by external AED  S/p emergent cath --> Two-vessel CAD, 100% occlusion of the mid right coronary artery culprit vessel, stent x 1, significant stenosis involving the mid LAD plan to mange conservatively with medical therapy  Echo pending  Continue aspirin, brilinta  Integrilin per protocol post cath  Aggressive risk modifications  Observe in CVU overnight    New onset a-fib  Cardiology following    Chronic:  ADHD --- continue home vyvanse when medically appropriate    Level Of Support Discussed With: Patient  Code Status (Patient has no pulse and is not breathing): CPR (Attempt to Resuscitate)  Medical Interventions (Patient has pulse or is breathing): Full Support     Nutrition:   Diet: Cardiac; Healthy Heart (2-3 Na+); Fluid Consistency: Thin (IDDSI 0)     VTE Prophylaxis:  No VTE prophylaxis order currently exists.    History of Present illness     Scar Velásquez is a 44 y.o. male with PMH of ADHD, HTN, presented to the hospital after he collapsed at work. The patient received bystander CPR and was placed on AED which delivered a shock. The patient was brought to [facility] ER and taken to cathlab. The patient received a stent x 1 to the RCA.    Patient states that overnight he was experiencing what felt like indigestion. Symptoms will be worse with laying down so he slept in a recliner overnight.He stated that at one point he woke up and was soaking wet with from sweat. He reported that he did take a full strength bayor aspirin during the night. And this morning while at work he took some tums. Patient stated he was able to work but towards the end of his shift he remembers feeling lightheaded and things becoming \"fuzzy.\" The next thing he remembered was waking up and to EMS being there. Patient denies any known " cardiac history. Patient denies any previous episodes of symptoms. He reports that he vapes daily. He drinks rarely, and does not use smokeless tobacco or smoke.     Patient was admitted to CVU following intervention for further monitoring overnight.      ACP: CPR, Full Intervention. Patient's spouse is listed as his decision maker in the event he is unable.     Patient was seen and examined on 08/30/24 at 17:06 EDT .    Subjective / Review of systems     Review of Systems   Constitutional:  Negative for chills and fever.   Respiratory:  Negative for chest tightness and shortness of breath.    Cardiovascular:  Negative for chest pain.   Gastrointestinal:  Negative for abdominal pain, nausea and vomiting.   Genitourinary:  Negative for difficulty urinating.   Musculoskeletal:  Negative for arthralgias and myalgias.   Neurological:  Negative for dizziness and light-headedness.        Past Medical/Surgical/Social/Family History & Allergies     Past Medical History:   Diagnosis Date    Biceps rupture, distal     Chondromalacia, patella     Elbow fracture, left     Essential (primary) hypertension 10/11/2017    Hearing loss     Obesity 12/21/2015      Past Surgical History:   Procedure Laterality Date    DISTAL BICEPS TENDON REPAIR Right 2/4/2020    Procedure: TENDON DISTAL BICEPS REPAIR;  Surgeon: Richard Berger MD;  Location: [facility] OR;  Service: Orthopedics;  Laterality: Right;    EAR TUBES      TONSILLECTOMY        Social History     Socioeconomic History    Marital status:    Tobacco Use    Smoking status: Former     Types: Electronic Cigarette     Quit date: 2014     Years since quitting: 10.6     Passive exposure: Past    Smokeless tobacco: Never    Tobacco comments:     10-15 times per day   Vaping Use    Vaping status: Former    Quit date: 6/14/2019   Substance and Sexual Activity    Alcohol use: Not Currently     Comment: rare  2 times per year    Drug use: Never    Sexual activity: Yes      Partners: Female     Birth control/protection: Ring      Family History   Problem Relation Age of Onset    Cancer Mother         Ovarian    Cancer Father         Prostate    Coronary artery disease Father       No Known Allergies   Social Determinants of Health     Tobacco Use: Medium Risk (8/30/2024)    Patient History     Smoking Tobacco Use: Former     Smokeless Tobacco Use: Never     Passive Exposure: Past   Alcohol Use: Not At Risk (8/30/2024)    AUDIT-C     Frequency of Alcohol Consumption: Never     Average Number of Drinks: Patient does not drink     Frequency of Binge Drinking: Never   Financial Resource Strain: Not on file   Food Insecurity: Not on file   Transportation Needs: Not on file   Physical Activity: Not on file   Stress: Not on file   Social Connections: Unknown (10/9/2023)    Family and Community Support     Help with Day-to-Day Activities: Not on file     Lonely or Isolated: Not on file   Interpersonal Safety: Not At Risk (8/30/2024)    Abuse Screen     Unsafe at Home or Work/School: no     Feels Threatened by Someone?: no     Does Anyone Keep You from Contacting Others or Doint Things Outside the Home?: no     Physical Sign of Abuse Present: no   Depression: Not at risk (5/6/2024)    PHQ-2     PHQ-2 Score: 0   Housing Stability: Unknown (8/30/2024)    Housing Stability     Current Living Arrangements: home     Potentially Unsafe Housing Conditions: Not on file   Utilities: Not on file   Health Literacy: Unknown (10/9/2023)    Education     Help with school or training?: Not on file     Preferred Language: Not on file   Employment: Unknown (10/9/2023)    Employment     Do you want help finding or keeping work or a job?: Not on file   Disabilities: Not At Risk (8/30/2024)    Disabilities     Concentrating, Remembering, or Making Decisions Difficulty: no     Doing Errands Independently Difficulty: no        Home Medications     Prior to Admission medications    Medication Sig Start Date End Date  Taking? Authorizing Provider   lisdexamfetamine (Vyvanse) 50 MG capsule Take 1 capsule by mouth Every Morning 8/6/24  Yes Gaetano Zhu PA-C        Objective / Physical Exam     Vital signs:  Temp: 97.8 °F (36.6 °C)  BP: 105/81  Heart Rate: 106  Resp: 22  SpO2: 100 %  Weight: 124 kg (272 lb 7.8 oz)    Admission Weight: Weight: 123 kg (272 lb)    Physical Exam  Vitals and nursing note reviewed.   Constitutional:       General: He is not in acute distress.     Appearance: He is not ill-appearing.   HENT:      Head: Normocephalic.      Mouth/Throat:      Mouth: Mucous membranes are moist.      Pharynx: Oropharynx is clear.   Eyes:      Extraocular Movements: Extraocular movements intact.      Conjunctiva/sclera: Conjunctivae normal.      Pupils: Pupils are equal, round, and reactive to light.   Cardiovascular:      Rate and Rhythm: Normal rate. Rhythm irregular.      Pulses: Normal pulses.      Heart sounds: Normal heart sounds.   Pulmonary:      Effort: Pulmonary effort is normal.      Breath sounds: Normal breath sounds.   Abdominal:      General: Bowel sounds are normal.      Palpations: Abdomen is soft.   Musculoskeletal:      Right lower leg: No edema.      Left lower leg: No edema.   Skin:     General: Skin is warm and dry.      Findings: No rash.   Neurological:      Mental Status: He is alert and oriented to person, place, and time.   Psychiatric:         Mood and Affect: Mood normal.         Behavior: Behavior normal.          Labs     Results from last 7 days   Lab Units 08/30/24  1329   WBC 10*3/mm3 16.46*   HEMATOCRIT % 42.1   PLATELETS 10*3/mm3 257      Results from last 7 days   Lab Units 08/30/24  1329   SODIUM mmol/L 142   POTASSIUM mmol/L 3.6   CHLORIDE mmol/L 106   CO2 mmol/L 23.8   BUN mg/dL 17   CREATININE mg/dL 1.25        Imaging     No radiology results for the last day      Current Medications     Scheduled Meds:  aspirin, 81 mg, Oral, Daily  atorvastatin, 40 mg, Oral, Nightly  dilTIAZem,  10 mg, Intravenous, Once  ticagrelor, 90 mg, Oral, BID         Continuous Infusions:  dilTIAZem, 5-15 mg/hr  eptifibatide, 2 mcg/kg/min           HEAVENLY Prado   Critical Care  08/30/24   17:06 EDT

## 2024-08-31 ENCOUNTER — APPOINTMENT (OUTPATIENT)
Dept: CARDIOLOGY | Facility: HOSPITAL | Age: 44
DRG: 322 | End: 2024-08-31
Payer: COMMERCIAL

## 2024-08-31 LAB
ANION GAP SERPL CALCULATED.3IONS-SCNC: 7.8 MMOL/L (ref 5–15)
APTT PPP: 28.2 SECONDS (ref 61–76.5)
BH CV ECHO LEFT VENTRICLE GLOBAL LONGITUDINAL STRAIN: -17.8 %
BH CV ECHO MEAS - AO MAX PG: 5.4 MMHG
BH CV ECHO MEAS - AO MEAN PG: 3 MMHG
BH CV ECHO MEAS - AO ROOT DIAM: 3.1 CM
BH CV ECHO MEAS - AO V2 MAX: 116 CM/SEC
BH CV ECHO MEAS - AO V2 VTI: 19.4 CM
BH CV ECHO MEAS - AVA(I,D): 2.32 CM2
BH CV ECHO MEAS - EDV(CUBED): 185.2 ML
BH CV ECHO MEAS - EDV(MOD-SP4): 85.2 ML
BH CV ECHO MEAS - EF(MOD-BP): 58 %
BH CV ECHO MEAS - EF(MOD-SP4): 58.2 %
BH CV ECHO MEAS - ESV(CUBED): 59.3 ML
BH CV ECHO MEAS - ESV(MOD-SP4): 35.6 ML
BH CV ECHO MEAS - FS: 31.6 %
BH CV ECHO MEAS - IVS/LVPW: 1.09 CM
BH CV ECHO MEAS - IVSD: 1.2 CM
BH CV ECHO MEAS - LA DIMENSION: 4.3 CM
BH CV ECHO MEAS - LAT PEAK E' VEL: 14.7 CM/SEC
BH CV ECHO MEAS - LV DIASTOLIC VOL/BSA (35-75): 39 CM2
BH CV ECHO MEAS - LV MASS(C)D: 272.5 GRAMS
BH CV ECHO MEAS - LV MAX PG: 2.06 MMHG
BH CV ECHO MEAS - LV MEAN PG: 1 MMHG
BH CV ECHO MEAS - LV SYSTOLIC VOL/BSA (12-30): 16.3 CM2
BH CV ECHO MEAS - LV V1 MAX: 71.8 CM/SEC
BH CV ECHO MEAS - LV V1 VTI: 13 CM
BH CV ECHO MEAS - LVIDD: 5.7 CM
BH CV ECHO MEAS - LVIDS: 3.9 CM
BH CV ECHO MEAS - LVOT AREA: 3.5 CM2
BH CV ECHO MEAS - LVOT DIAM: 2.1 CM
BH CV ECHO MEAS - LVPWD: 1.1 CM
BH CV ECHO MEAS - MED PEAK E' VEL: 8.8 CM/SEC
BH CV ECHO MEAS - MR MAX PG: 16.6 MMHG
BH CV ECHO MEAS - MR MAX VEL: 204 CM/SEC
BH CV ECHO MEAS - MV A MAX VEL: 53.5 CM/SEC
BH CV ECHO MEAS - MV DEC TIME: 0.17 SEC
BH CV ECHO MEAS - MV E MAX VEL: 77.6 CM/SEC
BH CV ECHO MEAS - MV E/A: 1.45
BH CV ECHO MEAS - MV MAX PG: 2.9 MMHG
BH CV ECHO MEAS - MV MEAN PG: 2 MMHG
BH CV ECHO MEAS - MV V2 VTI: 18.1 CM
BH CV ECHO MEAS - MVA(VTI): 2.49 CM2
BH CV ECHO MEAS - PA V2 MAX: 85.8 CM/SEC
BH CV ECHO MEAS - PI END-D VEL: 96.3 CM/SEC
BH CV ECHO MEAS - PULM A REVS DUR: 0.09 SEC
BH CV ECHO MEAS - PULM A REVS VEL: 29.8 CM/SEC
BH CV ECHO MEAS - PULM DIAS VEL: 43.4 CM/SEC
BH CV ECHO MEAS - PULM S/D: 1.27
BH CV ECHO MEAS - PULM SYS VEL: 55.1 CM/SEC
BH CV ECHO MEAS - RAP SYSTOLE: 8 MMHG
BH CV ECHO MEAS - RV MAX PG: 1.44 MMHG
BH CV ECHO MEAS - RV V1 MAX: 59.9 CM/SEC
BH CV ECHO MEAS - RV V1 VTI: 12.3 CM
BH CV ECHO MEAS - SV(LVOT): 45 ML
BH CV ECHO MEAS - SV(MOD-SP4): 49.6 ML
BH CV ECHO MEAS - SVI(LVOT): 20.6 ML/M2
BH CV ECHO MEAS - SVI(MOD-SP4): 22.7 ML/M2
BH CV ECHO MEAS - TAPSE (>1.6): 1.83 CM
BH CV ECHO MEASUREMENTS AVERAGE E/E' RATIO: 6.6
BH CV XLRA - TDI S': 13.3 CM/SEC
BUN SERPL-MCNC: 12 MG/DL (ref 6–20)
BUN/CREAT SERPL: 11.5 (ref 7–25)
CALCIUM SPEC-SCNC: 9.4 MG/DL (ref 8.6–10.5)
CHLORIDE SERPL-SCNC: 104 MMOL/L (ref 98–107)
CHOLEST SERPL-MCNC: 236 MG/DL (ref 0–200)
CO2 SERPL-SCNC: 28.2 MMOL/L (ref 22–29)
CREAT SERPL-MCNC: 1.04 MG/DL (ref 0.76–1.27)
DEPRECATED RDW RBC AUTO: 40.7 FL (ref 37–54)
EGFRCR SERPLBLD CKD-EPI 2021: 90.8 ML/MIN/1.73
ERYTHROCYTE [DISTWIDTH] IN BLOOD BY AUTOMATED COUNT: 12.5 % (ref 12.3–15.4)
GLUCOSE BLDC GLUCOMTR-MCNC: 144 MG/DL (ref 70–105)
GLUCOSE SERPL-MCNC: 98 MG/DL (ref 65–99)
HCT VFR BLD AUTO: 42.6 % (ref 37.5–51)
HDLC SERPL-MCNC: 32 MG/DL (ref 40–60)
HGB BLD-MCNC: 14.2 G/DL (ref 13–17.7)
LDLC SERPL CALC-MCNC: 175 MG/DL (ref 0–100)
LDLC/HDLC SERPL: 5.4 {RATIO}
LEFT ATRIUM VOLUME INDEX: 24.4 ML/M2
MCH RBC QN AUTO: 29.3 PG (ref 26.6–33)
MCHC RBC AUTO-ENTMCNC: 33.3 G/DL (ref 31.5–35.7)
MCV RBC AUTO: 88 FL (ref 79–97)
PLATELET # BLD AUTO: 250 10*3/MM3 (ref 140–450)
PMV BLD AUTO: 10.9 FL (ref 6–12)
POTASSIUM SERPL-SCNC: 3.9 MMOL/L (ref 3.5–5.2)
QT INTERVAL: 392 MS
QT INTERVAL: 425 MS
QTC INTERVAL: 443 MS
QTC INTERVAL: 534 MS
RBC # BLD AUTO: 4.84 10*6/MM3 (ref 4.14–5.8)
SINUS: 3.2 CM
SODIUM SERPL-SCNC: 140 MMOL/L (ref 136–145)
STJ: 2.7 CM
TRIGL SERPL-MCNC: 156 MG/DL (ref 0–150)
VLDLC SERPL-MCNC: 29 MG/DL (ref 5–40)
WBC NRBC COR # BLD AUTO: 15.13 10*3/MM3 (ref 3.4–10.8)

## 2024-08-31 PROCEDURE — 93005 ELECTROCARDIOGRAM TRACING: CPT | Performed by: INTERNAL MEDICINE

## 2024-08-31 PROCEDURE — 80048 BASIC METABOLIC PNL TOTAL CA: CPT | Performed by: INTERNAL MEDICINE

## 2024-08-31 PROCEDURE — 80061 LIPID PANEL: CPT | Performed by: INTERNAL MEDICINE

## 2024-08-31 PROCEDURE — 93306 TTE W/DOPPLER COMPLETE: CPT | Performed by: INTERNAL MEDICINE

## 2024-08-31 PROCEDURE — 93356 MYOCRD STRAIN IMG SPCKL TRCK: CPT | Performed by: INTERNAL MEDICINE

## 2024-08-31 PROCEDURE — 82948 REAGENT STRIP/BLOOD GLUCOSE: CPT

## 2024-08-31 PROCEDURE — 99233 SBSQ HOSP IP/OBS HIGH 50: CPT | Performed by: INTERNAL MEDICINE

## 2024-08-31 PROCEDURE — 85027 COMPLETE CBC AUTOMATED: CPT | Performed by: INTERNAL MEDICINE

## 2024-08-31 PROCEDURE — 85730 THROMBOPLASTIN TIME PARTIAL: CPT | Performed by: INTERNAL MEDICINE

## 2024-08-31 PROCEDURE — 93306 TTE W/DOPPLER COMPLETE: CPT

## 2024-08-31 PROCEDURE — 93356 MYOCRD STRAIN IMG SPCKL TRCK: CPT

## 2024-08-31 RX ORDER — CARVEDILOL 3.12 MG/1
3.12 TABLET ORAL 2 TIMES DAILY WITH MEALS
Status: DISCONTINUED | OUTPATIENT
Start: 2024-08-31 | End: 2024-09-01

## 2024-08-31 RX ADMIN — ASPIRIN 81 MG: 81 TABLET, COATED ORAL at 11:11

## 2024-08-31 RX ADMIN — CARVEDILOL 3.12 MG: 3.12 TABLET, FILM COATED ORAL at 17:46

## 2024-08-31 RX ADMIN — ATORVASTATIN CALCIUM 40 MG: 40 TABLET, FILM COATED ORAL at 19:58

## 2024-08-31 RX ADMIN — TICAGRELOR 90 MG: 90 TABLET ORAL at 19:59

## 2024-08-31 RX ADMIN — TICAGRELOR 90 MG: 90 TABLET ORAL at 04:09

## 2024-08-31 RX ADMIN — CARVEDILOL 3.12 MG: 3.12 TABLET, FILM COATED ORAL at 11:11

## 2024-08-31 NOTE — PROGRESS NOTES
"Critical Care Progress Note     Scar Velásquez : 1980 MRN:[id number] LOS:1  Rm: 3108/1     Principal Problem: Acute coronary syndrome     Reason for follow up: All the medical problems listed below    Summary     44 y.o. male with PMH of ADHD, HTN, presented to the hospital after he collapsed at work. The patient received bystander CPR and was placed on AED which delivered a shock. The patient was brought to [facility] ER and taken to cathlab. The patient received a stent x 1 to the RCA.     Patient states that overnight he was experiencing what felt like indigestion. Symptoms will be worse with laying down so he slept in a recliner overnight.He stated that at one point he woke up and was soaking wet with from sweat. He reported that he did take a full strength bayor aspirin during the night. And this morning while at work he took some tums. Patient stated he was able to work but towards the end of his shift he remembers feeling lightheaded and things becoming \"fuzzy.\" The next thing he remembered was waking up and to EMS being there. Patient denies any known cardiac history. Patient denies any previous episodes of symptoms. He reports that he vapes daily. He drinks rarely, and does not use smokeless tobacco or smoke.      Patient was admitted to CVU following intervention for further monitoring overnight.      Significant Events     24 : Patient afebrile, vital signs are within normal limits.  Adequate urine output.  Chemistry panel completely unremarkable.  Lipid panel noted for a very low HDL at 32, total cholesterol elevated 236, triglycerides elevated at 166.  White blood cell count slightly elevated at 15,000 which is likely secondary to stress response from the acute cardiac event.  Patient is doing well and awaiting echocardiogram today.  No indication for ICU level care and will therefore downgrade to PCU.  Recommend OP PSG to eval for NORMA.    Assessment / Plan     Acute coronary syndrome  Shocked by " "external AED  S/p emergent cath --> Two-vessel CAD, 100% occlusion of the mid right coronary artery culprit vessel, stent x 1, significant stenosis involving the mid LAD plan to mange conservatively with medical therapy  Echo pending  Continue aspirin, brilinta  Integrilin per protocol post cath  Aggressive risk modifications  Observed in CVU overnight     New onset a-fib  Cardiology following     Chronic:  ADHD --- continue home vyvanse when medically appropriate      Other:  Recommend referral to outpt sleep medicine for PSG prior to d/c from this admission.      Disposition:  PCU    Code status:   Level Of Support Discussed With: Patient  Code Status (Patient has no pulse and is not breathing): CPR (Attempt to Resuscitate)  Medical Interventions (Patient has pulse or is breathing): Full Support       Nutrition:   Diet: Cardiac; Healthy Heart (2-3 Na+); Fluid Consistency: Thin (IDDSI 0)   Patient isn't on Tube Feeding     VTE Prophylaxis:  No VTE prophylaxis order currently exists.         Subjective / Review of systems     Review of Systems   Patient states that he feels \"right as rain\".  Denies shortness of breath or chest pain.  Denies fevers, chills, sweats, nausea, vomiting.  Has taken p.o. and did fine with that.  Objective / Physical Exam     Vital signs:  Temp: 98.2 °F (36.8 °C)  BP: 96/66  Heart Rate: 87  Resp: 21  SpO2: 99 %  Weight: 124 kg (272 lb 7.8 oz)    Admission Weight: Weight: 123 kg (272 lb)  Current Weight: Weight: 124 kg (272 lb 7.8 oz)    Input/Output in last 24 hours:    Intake/Output Summary (Last 24 hours) at 8/31/2024 1001  Last data filed at 8/31/2024 0600  Gross per 24 hour   Intake 670 ml   Output 625 ml   Net 45 ml      Net IO Since Admission: 45 mL [08/31/24 1001]     Physical Exam     GEN:  Pleasant, young male, slightly obese.  Appears appropriate for stated age.  WD/WN/WH.  Sitting up in bed on NC.  NAD.  NEURO:  Brainstem reflexes intact.  No obvious focal deficit.  Moves all 4 " ext.  HEENT:  N/AT.  PERRL.  MMM.  Oropharynx non-erythematous.  No drainage from the eyes/ears/nose.  No conjunctival petechiae.  No oral thrush.  Auditory and visual acuity grossly wnl.  Good dentition.  Voice normal.  NECK:  Supple, NT, trachea midline.  No meningismus.  No ROM limitation.  No torticollis.  No JVD.  No thyromegaly.    CHEST/LUNGS:  Breath sounds are clear and equal bilaterally.  No w/r/r.  Chest excursion equal bilaterally.    CARDIOVASCULAR:  RRR w/o murmur noted.  PMI not displaced.  GI:  Abdomen soft, NT, ND, +BS.  No HSM.  :  Deferred.  EXTREMITIES:  No deformity or amputation.  No cyanosis, edema, or asymmetry.  Pulses 2+ and equal in BLE's.    SKIN:  Warm, dry, and pink.  No rash, breakdown, or track marks noted.  LYMPHATICS/HEME:  No overt LAD or abnormal bruising.  No lymphedema.  MSK:  Normal ROM.  No joint abnormalities noted.  Strength is 5/5 and equal in BUE and BLE's.  PSYCH:  Pleasant.  A&Ox 3.  Normal mood and affect.  Responds appropriately to commands and appears to comprehend instructions.            Radiology and Labs     Results from last 7 days   Lab Units 08/31/24  0343 08/30/24  1329   WBC 10*3/mm3 15.13* 16.46*   HEMOGLOBIN g/dL 14.2 14.3   HEMATOCRIT % 42.6 42.1   PLATELETS 10*3/mm3 250 257      Results from last 7 days   Lab Units 08/31/24  0343 08/30/24  1329   PROTIME Seconds  --  10.7   INR   --  0.98   APTT seconds 28.2* 25.0*      Results from last 7 days   Lab Units 08/31/24  0343 08/30/24  1329   SODIUM mmol/L 140 142   POTASSIUM mmol/L 3.9 3.6   CHLORIDE mmol/L 104 106   CO2 mmol/L 28.2 23.8   BUN mg/dL 12 17   CREATININE mg/dL 1.04 1.25   GLUCOSE mg/dL 98 135*   MAGNESIUM mg/dL  --  2.1      Results from last 7 days   Lab Units 08/30/24  1329   ALK PHOS U/L 71   AST (SGOT) U/L 104*   ALT (SGPT) U/L 77*           No radiology results for the last day    Current medications     Scheduled Meds:   aspirin, 81 mg, Oral, Daily  atorvastatin, 40 mg, Oral,  Nightly  carvedilol, 3.125 mg, Oral, BID With Meals  ticagrelor, 90 mg, Oral, BID        Continuous Infusions:          Plan discussed with RN. Reviewed all other data in the last 24 hours, including but not limited to vitals, labs, microbiology, imaging and pertinent notes from other providers.  Plan also discussed with pt and his wife at the bedside.      Dre Church,    Critical Care  08/31/24   10:01 EDT

## 2024-08-31 NOTE — PLAN OF CARE
Goal Outcome Evaluation:      Patient without chest pain, ambulating well, and tolerating new medication.

## 2024-08-31 NOTE — PROGRESS NOTES
Cardiology [facility]      Patient Care Team:  Gaetano Zhu PA-C as PCP - General (Physician Assistant)            Cardiology assessment and plan        Acute coronary syndrome  Shocked by external AED  Abnormal EKG  Intermittent heartburn for 2 weeks with some symptoms last night and this morning  Abnormal EKG with inferior Q waves and nonspecific ST changes  Syncope  Newly diagnosed atrial fibrillation  Abnormal troponin  Atrial fibrillation with controlled ventricular response  Hyperlipidemia  Severe two-vessel coronary artery disease  100% occlusion of the mid right coronary artery culprit vessel for patient acute coronary syndrome and possible arrhythmia and syncope 6 will treat with placement of Xience drug-eluting stent  Significant stenosis involving the mid LAD plan to manage conservatively medical therapy at this time  Echocardiogram to assess LV systolic function  Aspirin 81 mg p.o. once a day  Brilinta 90 mg p.o. twice daily  Continue Integrilin for 1 bottle  Continued aggressive risk factor modification  High-dose statin  Likely will benefit from long-term oral anticoagulation for atrial fibrillation for stroke prophylaxis  Maykel vascular score of 2  Echocardiogram to assess LV systolic function  Diagnosis and treatment options reviewed and discussed with patient  Tmax is 98.2 pulse is 86 respirations are 20 blood pressure is 96/66 to 140/90 sats are 99%  Groin site looks good except for a small bruise  Sodium is 140 potassium is 3.9 creatinine is 1.0 hemoglobin is 14.2  Echocardiogram to assess LV systolic function  Current medications include aspirin 81 mg p.o. once a day Lipitor 40 mg p.o. once a day  Brilinta 90 mg p.o. twice daily  Start patient on low-dose beta-blocker if the blood pressure tolerates  Likely consider starting patient on anticoagulation therapy either tonight or tomorrow morning before discharge  Further recommendation based on patient course        Chief Complaint: Chest discomfort  "syncope and AED shock    Subjective no new complaints    Interval History: No significant change in the overall    History taken from: patient    Review of Systems:  ROS    Objective    Vital Signs  Visit Vitals  BP 96/66 (BP Location: Right arm, Patient Position: Lying)   Pulse 87   Temp 98.2 °F (36.8 °C) (Oral)   Resp 21   Ht 185.4 cm (73\")   Wt 124 kg (272 lb 7.8 oz)   SpO2 99%   BMI 35.95 kg/m²     Oxygen Therapy  SpO2: 99 %  Pulse Oximetry Type: Continuous  Device (Oxygen Therapy): room air  Flow (L/min): 2  Flowsheet Rows      Flowsheet Row First Filed Value   Admission Height 185.4 cm (73\") Documented at 08/30/2024 1328   Admission Weight 123 kg (272 lb) Documented at 08/30/2024 1328          Intake & Output (last 3 days)         08/28 0701  08/29 0700 08/29 0701  08/30 0700 08/30 0701  08/31 0700 08/31 0701  09/01 0700    P.O.   480     I.V. (mL/kg)   190 (1.5)     Total Intake(mL/kg)   670 (5.4)     Urine (mL/kg/hr)   625     Emesis/NG output   0     Stool   0     Total Output   625     Net   +45             Urine Unmeasured Occurrence   1 x     Stool Unmeasured Occurrence   0 x     Emesis Unmeasured Occurrence   1 x           Lines, Drains & Airways       Active LDAs       Name Placement date Placement time Site Days    Peripheral IV 08/30/24 1330 Left Antecubital 08/30/24  1330  Antecubital  less than 1                    Physical Exam:  Physical Exam    Results Review:     I reviewed the patient's new clinical results.    Lab Results (last 24 hours)       Procedure Component Value Units Date/Time    Basic Metabolic Panel [[id number]]  (Normal) Collected: 08/31/24 0343    Specimen: Blood Updated: 08/31/24 0416     Glucose 98 mg/dL      BUN 12 mg/dL      Creatinine 1.04 mg/dL      Sodium 140 mmol/L      Potassium 3.9 mmol/L      Chloride 104 mmol/L      CO2 28.2 mmol/L      Calcium 9.4 mg/dL      BUN/Creatinine Ratio 11.5     Anion Gap 7.8 mmol/L      eGFR 90.8 mL/min/1.73     Narrative:      GFR Normal " >60  Chronic Kidney Disease <60  Kidney Failure <15      Lipid Panel [[id number]]  (Abnormal) Collected: 08/31/24 0343    Specimen: Blood Updated: 08/31/24 0416     Total Cholesterol 236 mg/dL      Triglycerides 156 mg/dL      HDL Cholesterol 32 mg/dL      LDL Cholesterol  175 mg/dL      VLDL Cholesterol 29 mg/dL      LDL/HDL Ratio 5.40    Narrative:      Cholesterol Reference Ranges  (U.S. Department of Health and Human Services ATP III Classifications)    Desirable          <200 mg/dL  Borderline High    200-239 mg/dL  High Risk          >240 mg/dL      Triglyceride Reference Ranges  (U.S. Department of Health and Human Services ATP III Classifications)    Normal           <150 mg/dL  Borderline High  150-199 mg/dL  High             200-499 mg/dL  Very High        >500 mg/dL    HDL Reference Ranges  (U.S. Department of Health and Human Services ATP III Classifications)    Low     <40 mg/dl (major risk factor for CHD)  High    >60 mg/dl ('negative' risk factor for CHD)        LDL Reference Ranges  (U.S. Department of Health and Human Services ATP III Classifications)    Optimal          <100 mg/dL  Near Optimal     100-129 mg/dL  Borderline High  130-159 mg/dL  High             160-189 mg/dL  Very High        >189 mg/dL    aPTT [[id number]]  (Abnormal) Collected: 08/31/24 0343    Specimen: Blood Updated: 08/31/24 0413     PTT 28.2 seconds     CBC (No Diff) [[id number]]  (Abnormal) Collected: 08/31/24 0343    Specimen: Blood Updated: 08/31/24 0357     WBC 15.13 10*3/mm3      RBC 4.84 10*6/mm3      Hemoglobin 14.2 g/dL      Hematocrit 42.6 %      MCV 88.0 fL      MCH 29.3 pg      MCHC 33.3 g/dL      RDW 12.5 %      RDW-SD 40.7 fl      MPV 10.9 fL      Platelets 250 10*3/mm3     POC Activated Clotting Time [[id number]]  (Abnormal) Collected: 08/30/24 1933    Specimen: Arterial Blood Updated: 08/30/24 2004     Activated Clotting Time  152 Seconds      Comment: Serial Number: [id number]:  [id number]       POC Activated  Clotting Time [[id number]]  (Abnormal) Collected: 08/30/24 1851    Specimen: Arterial Blood Updated: 08/30/24 1857     Activated Clotting Time  171 Seconds      Comment: Serial Number: [id number]:  [id number]       Protime-INR [[id number]]  (Normal) Collected: 08/30/24 1329    Specimen: Blood Updated: 08/30/24 1445     Protime 10.7 Seconds      INR 0.98    aPTT [[id number]]  (Abnormal) Collected: 08/30/24 1329    Specimen: Blood Updated: 08/30/24 1445     PTT 25.0 seconds     High Sensitivity Troponin T [[id number]]  (Abnormal) Collected: 08/30/24 1329    Specimen: Blood Updated: 08/30/24 1407     HS Troponin T 140 ng/L     Narrative:      High Sensitive Troponin T Reference Range:  <14.0 ng/L- Negative Female for AMI  <22.0 ng/L- Negative Male for AMI  >=14 - Abnormal Female indicating possible myocardial injury.  >=22 - Abnormal Male indicating possible myocardial injury.   Clinicians would have to utilize clinical acumen, EKG, Troponin, and serial changes to determine if it is an Acute Myocardial Infarction or myocardial injury due to an underlying chronic condition.         D-dimer, Quantitative [[id number]]  (Abnormal) Collected: 08/30/24 1329    Specimen: Blood Updated: 08/30/24 1404     D-Dimer, Quantitative 0.93 mg/L (FEU)     Narrative:      According to the assay 's published package insert, a normal (<0.50 mg/L (FEU)) D-dimer result in conjunction with a non-high clinical probability assessment, excludes deep vein thrombosis (DVT) and pulmonary embolism (PE) with high sensitivity.    D-dimer values increase with age and this can make VTE exclusion of an older population difficult. To address this, the American College [employer], based on best available evidence and recent guidelines, recommends that clinicians use age-adjusted D-dimer thresholds in patients greater than 50 years of age with: a) a low probability of PE who do not meet all Pulmonary Embolism Rule Out Criteria, or b) in those with  "intermediate probability of PE.   The formula for an age-adjusted D-dimer cut-off is \"age/100\".  For example, a 60 year old patient would have an age-adjusted cut-off of 0.60 mg/L (FEU) and an 80 year old 0.80 mg/L (FEU).    Magnesium [[id number]]  (Normal) Collected: 08/30/24 1329    Specimen: Blood Updated: 08/30/24 1404     Magnesium 2.1 mg/dL     Comprehensive Metabolic Panel [[id number]]  (Abnormal) Collected: 08/30/24 1329    Specimen: Blood Updated: 08/30/24 1404     Glucose 135 mg/dL      BUN 17 mg/dL      Creatinine 1.25 mg/dL      Sodium 142 mmol/L      Potassium 3.6 mmol/L      Chloride 106 mmol/L      CO2 23.8 mmol/L      Calcium 10.4 mg/dL      Total Protein 6.8 g/dL      Albumin 4.4 g/dL      ALT (SGPT) 77 U/L      AST (SGOT) 104 U/L      Alkaline Phosphatase 71 U/L      Total Bilirubin 0.3 mg/dL      Globulin 2.4 gm/dL      A/G Ratio 1.8 g/dL      BUN/Creatinine Ratio 13.6     Anion Gap 12.2 mmol/L      eGFR 72.8 mL/min/1.73     Narrative:      GFR Normal >60  Chronic Kidney Disease <60  Kidney Failure <15      Immaculata Draw [[id number]] Collected: 08/30/24 1329    Specimen: Blood Updated: 08/30/24 1346    Narrative:      The following orders were created for panel order Immaculata Draw.  Procedure                               Abnormality         Status                     ---------                               -----------         ------                     Green Top (Gel)[[id number]]                                  Final result               Lavender Top[[id number]]                                     Final result               Gold Top - SST[[id number]]                                   Final result               Light Blue Top[[id number]]                                   Final result                 Please view results for these tests on the individual orders.    Green Top (Gel) [[id number]] Collected: 08/30/24 1329    Specimen: Blood Updated: 08/30/24 1346     Extra Tube Hold for add-ons.     Comment: " Auto resulted.       Lavender Top [[id number]] Collected: 08/30/24 1329    Specimen: Blood Updated: 08/30/24 1346     Extra Tube hold for add-on     Comment: Auto resulted       Gold Top - SST [[id number]] Collected: 08/30/24 1329    Specimen: Blood Updated: 08/30/24 1346     Extra Tube Hold for add-ons.     Comment: Auto resulted.       Light Blue Top [[id number]] Collected: 08/30/24 1329    Specimen: Blood Updated: 08/30/24 1346     Extra Tube Hold for add-ons.     Comment: Auto resulted       CBC & Differential [[id number]]  (Abnormal) Collected: 08/30/24 1329    Specimen: Blood Updated: 08/30/24 1339    Narrative:      The following orders were created for panel order CBC & Differential.  Procedure                               Abnormality         Status                     ---------                               -----------         ------                     CBC Auto Differential[[id number]]        Abnormal            Final result                 Please view results for these tests on the individual orders.    CBC Auto Differential [[id number]]  (Abnormal) Collected: 08/30/24 1329    Specimen: Blood Updated: 08/30/24 1339     WBC 16.46 10*3/mm3      RBC 4.91 10*6/mm3      Hemoglobin 14.3 g/dL      Hematocrit 42.1 %      MCV 85.7 fL      MCH 29.1 pg      MCHC 34.0 g/dL      RDW 12.5 %      RDW-SD 39.0 fl      MPV 10.6 fL      Platelets 257 10*3/mm3      Neutrophil % 83.0 %      Lymphocyte % 9.8 %      Monocyte % 4.7 %      Eosinophil % 1.5 %      Basophil % 0.5 %      Immature Grans % 0.5 %      Neutrophils, Absolute 13.67 10*3/mm3      Lymphocytes, Absolute 1.61 10*3/mm3      Monocytes, Absolute 0.77 10*3/mm3      Eosinophils, Absolute 0.24 10*3/mm3      Basophils, Absolute 0.09 10*3/mm3      Immature Grans, Absolute 0.08 10*3/mm3      nRBC 0.0 /100 WBC           No results found for this or any previous visit.        Medication Review:   I have reviewed the patient's current medication list  Scheduled Meds:aspirin,  81 mg, Oral, Daily  atorvastatin, 40 mg, Oral, Nightly  dilTIAZem, 10 mg, Intravenous, Once  ticagrelor, 90 mg, Oral, BID      Continuous Infusions:dilTIAZem, 5-15 mg/hr      PRN Meds:.  acetaminophen    aluminum-magnesium hydroxide-simethicone    atropine    nitroglycerin    ondansetron ODT **OR** ondansetron    [COMPLETED] Insert Peripheral IV **AND** sodium chloride    sodium chloride    ECG/EMG Results (last 24 hours)       Procedure Component Value Units Date/Time    ECG 12 Lead Tachycardia [[id number]] Collected: 08/30/24 1406     Updated: 08/30/24 1516     QT Interval 380 ms      QTC Interval 496 ms     Narrative:      HEART CIGJ=019  bpm  RR Kmjcqyjs=980  ms  FL Interval=  ms  P Horizontal Axis=  deg  P Front Axis=  deg  QRSD Slxusyzq=440  ms  QT Qnrazzui=947  ms  QFvO=428  ms  QRS Axis=5  deg  T Wave Axis=-33  deg  - ABNORMAL ECG -  Atrial fibrillation  Inferior  infarct, age indeterminate  Electronically Signed By: Manuel Church ([facility]) 2024-08-30 15:16:31  Date and Time of Study:2024-08-30 14:06:51    Telemetry Scan [[id number]] Resulted: 08/30/24     Updated: 08/30/24 1703    Telemetry Scan [[id number]] Resulted: 08/30/24     Updated: 08/30/24 1720    ECG 12 Lead Rhythm Change [[id number]] Collected: 08/30/24 1615     Updated: 08/30/24 1817     QT Interval 366 ms      QTC Interval 504 ms     Narrative:      HEART QIZF=229  bpm  RR Wncbkoqh=864  ms  FL Interval=  ms  P Horizontal Axis=  deg  P Front Axis=  deg  QRSD Pyrohgrj=609  ms  QT Ogzmccgw=777  ms  AIcZ=037  ms  QRS Axis=13  deg  T Wave Axis=-42  deg  - ABNORMAL ECG -  Atrial fibrillation  Inferior  infarct, age indeterminate  Prolonged QT interval  Electronically Signed By: Stef Hercules ([facility]) 2024-08-30 18:13:35  Date and Time of Study:2024-08-30 16:15:31    Telemetry Scan [[id number]] Resulted: 08/30/24     Updated: 08/30/24 2309    Telemetry Scan [[id number]] Resulted: 08/30/24     Updated: 08/31/24 0313    ECG 12 Lead Rhythm Change [[id number]]  Collected: 08/31/24 0339     Updated: 08/31/24 0340     QT Interval 425 ms      QTC Interval 443 ms     Narrative:      HEART RATE=65  bpm  RR Krilyyha=826  ms  W[place] Interval=  ms  P Horizontal Axis=  deg  P Front Axis=  deg  QRSD Interval=98  ms  QT Gbatfqpm=731  ms  EDnV=485  ms  QRS Axis=-28  deg  T Wave Axis=-31  deg  - ABNORMAL ECG -  Atrial fibrillation  Inferior infarct, age indeterminate  Date and Time of Study:2024-08-31 03:39:35    ECG 12 Lead Chest Pain [[id number]] Collected: 08/30/24 1326     Updated: 08/31/24 0712     QT Interval 392 ms      QTC Interval 534 ms     Narrative:      HEART MVOY=199  bpm  RR Sfoyokdr=215  ms  W[place] Interval=  ms  P Horizontal Axis=  deg  P Front Axis=  deg  QRSD Gdbecoxc=636  ms  QT Wcplcqfo=084  ms  ZYeC=207  ms  QRS Axis=8  deg  T Wave Axis=91  deg  - ABNORMAL ECG -  Atrial fibrillation  Inferior  infarct, old  Nonspecific  T abnormalities, lateral leads  Prolonged QT interval  Electronically Signed By: Manuel Church ([facility]) 2024-08-31 07:12:28  Date and Time of Study:2024-08-30 13:26:10            Imaging Results (Last 24 Hours)       ** No results found for the last 24 hours. **          Results for orders placed during the hospital encounter of 08/30/24    Cardiac Catheterization/Vascular Study    Conclusion  Table formatting from the original result was not included.  Cardiac Catheterization with PCI Report    Scar PACHECO Didier  [id number]  8/30/2024  @PCP@    He underwent cardiac catheterization and percutaneous coronary intervention.    Indications for the procedure include: acute coronary syndrome.  Acute myocardial infarction  Chest discomfort  External AICD shock for possible ventricular arrhythmia  Syncope  Abnormal troponin  Abnormal EKG    Procedure Details:  The risks, benefits, complications, treatment options, and expected outcomes were discussed with the patient. The patient and/or family concurred with the proposed plan, giving informed consent.    After informed  consent the patient was brought to the cath lab after appropriate IV hydration was begun and oral premedication was given. He was further sedated with fentanyl. He was prepped and draped in the usual manner. Using the modified Seldinger access technique, a 6 Armenian sheath was placed in the femoral artery. A left heart catheterization with coronary arteriography was performed. Findings are discussed below.    The decision was made to proceed with intervention. He received Heparin as per protocol. The details of the intervention are as follows:    For the interventional procedure we used a JR4 guide catheter with a VM Discovery guidewire initially and then eventually switched to [employer] wire Entroease rebellion support across the lesion in the distal right coronary artery lesion is predilated with a 3.0 x 12 mm noncompliant balloon up to 8 isabella and lesion is stented with a 3.5 x 23 mm Xience drug-eluting stent that was deployed at 10 isabella with good angiographic results.  Patient tolerated procedure well with no immediate possible complication plan to obtain hemostasis by manual pressure.  Residual anticoagulation was heparin patient received Integrilin bolus and drip at the end of the procedure.  Patient also received 180 mg of Brilinta at the end of the procedure.    After the procedure was completed, sedation was stopped and the sheaths and catheters were all removed according to established hospital protocols.    Conscious sedation:  Conscious sedation was performed according to protocol.  I supervised and directed an independent trained observer with the assistance of monitoring the patient's level of consciousness and physiologic status throughout the procedure.  Intraoperative service time was 90 minutes.    Findings:    Hemodynamics Central aortic pressure systolic 150 and diastolic 82 with a mean pressure of 90 mmHg  LV end-diastolic pressure of 20 mmHg  There was no gradient across the aortic valve on the pullback of  the pigtail catheter  Left Main Large-caliber vessel angiographically normal bifurcates into left into descending and left circumflex arteries  RCA Large-caliber dominant vessel with 100% occlusion in the distal right coronary artery culprit vessel for patient acute coronary syndrome successfully treated with placement of a Xience drug-eluting stent  LAD Large-caliber vessel  LAD has mid focal area of angiographic 80% stenosis at the diagonal bifurcation  Ostium of the diagonal branch has angiographic 50% stenosis  Apical LAD has another focal area of angiographic 50% stenosis with some intramyocardial bridging  Circ Large-caliber vessel mid focal angiographic 30% stenosis otherwise rest of the left circumflex artery is angiographically normal  Marginal branch of left circumflex artery is a large-caliber vessel angiographically normal  LV Not done  Coronary dominance Right coronary artery    Interventions/Vessels Accessible PCI and stenting of the distal right coronary artery with placement of a Xience drug-eluting stent  Guides/Wires BMW and water guidewire  Devices Xience drug-eluting stent 3.5 x 23 mm  Post % Stenosis  0  Pre-procedure CARL flow  0  Post procedure CARL flow  3  Closure Device None  Complications None    Estimated Blood Loss:  Minimal    Specimens: None    Complications:  None; patient tolerated the procedure well.    Disposition: PACU - hemodynamically stable.    Condition: stable    Impressions:  Severe two-vessel coronary artery disease  100% occlusion of the mid right coronary artery culprit vessel for patient acute coronary syndrome and possible arrhythmia and syncope 6 will treat with placement of Xience drug-eluting stent  Significant stenosis involving the mid LAD plan to manage conservatively medical therapy at this time  Echocardiogram to assess LV systolic function    Recommendations:  Aspirin 81 mg p.o. once a day  Brilinta 90 mg p.o. twice daily  Continue Integrilin for 1  bottle  Continued aggressive risk factor modification  Observe patient in CVU bed overnight  Test results reviewed and discussed with patient and family     No results found for this or any previous visit.     Lab Results   Component Value Date    GLUCOSE 98 08/31/2024    BUN 12 08/31/2024    CREATININE 1.04 08/31/2024    EGFR 90.8 08/31/2024    BCR 11.5 08/31/2024    K 3.9 08/31/2024    CO2 28.2 08/31/2024    CALCIUM 9.4 08/31/2024    ALBUMIN 4.4 08/30/2024    BILITOT 0.3 08/30/2024     (H) 08/30/2024    ALT 77 (H) 08/30/2024      Lab Results   Component Value Date    CHOL 236 (H) 08/31/2024    TRIG 156 (H) 08/31/2024    HDL 32 (L) 08/31/2024     (H) 08/31/2024      Lab Results   Component Value Date    TROPONINT 140 (C) 08/30/2024        Assessment & Plan       Acute coronary syndrome    Elevated blood pressure reading    Attention deficit hyperactivity disorder (ADHD), predominantly inattentive type        Stef Hercules MD  08/31/24  09:04 EDT

## 2024-08-31 NOTE — CONSULTS
"    [facility] MEDICINE SERVICE  TRANSFER OF CARE/ACCEPTANCE NOTE    PATIENT NAME: Scar Velásquez  : 1980  MRN: [id number]     Active Hospital Problems    Diagnosis  POA    **Acute coronary syndrome [I24.9]  Yes    Attention deficit hyperactivity disorder (ADHD), predominantly inattentive type [F90.0]  Yes    Elevated blood pressure reading [R03.0]  Yes      Resolved Hospital Problems    Diagnosis Date Resolved POA    STEMI (ST elevation myocardial infarction) [I21.3] 2024 Yes     This is not a billable encounter, please see Intensivist's note.     Patient seen and examined by me on 24 at 1150.    Interim History: Patient was admitted yesterday, and HPI is as follows; \" 44 y.o. male with PMH of ADHD, HTN, presented to the hospital after he collapsed at work. The patient received bystander CPR and was placed on AED which delivered a shock. The patient was brought to [facility] ER and taken to cathlab. The patient received a stent x 1 to the RCA.     Patient states that overnight he was experiencing what felt like indigestion. Symptoms will be worse with laying down so he slept in a recliner overnight.He stated that at one point he woke up and was soaking wet with from sweat. He reported that he did take a full strength bayor aspirin during the night. And this morning while at work he took some tums. Patient stated he was able to work but towards the end of his shift he remembers feeling lightheaded and things becoming \"fuzzy.\" The next thing he remembered was waking up and to EMS being there. Patient denies any known cardiac history. Patient denies any previous episodes of symptoms. He reports that he vapes daily. He drinks rarely, and does not use smokeless tobacco or smoke.   Patient was admitted to CVU following intervention for further monitoring overnight.  \"    I have noted the following changes since admission: Patient is being actively treated for ACS, he is S/P one stent placed to the RCA. He " also has new onset atrial fibrillation. ECHO ordered and pending. Patient to be downgraded from ICU level care to PCU today.     I have reviewed the H&P, diagnostic data and plan of care for Scar Velásquez.  The Hospitalist will be taking over care of this patient during the current hospitalization.        Signature: Electronically signed by HEAVENLY Green, 08/31/24, 11:50 EDT.  [facility] Hospitalist Team

## 2024-09-01 ENCOUNTER — APPOINTMENT (OUTPATIENT)
Dept: RESPIRATORY THERAPY | Facility: HOSPITAL | Age: 44
DRG: 322 | End: 2024-09-01
Payer: COMMERCIAL

## 2024-09-01 ENCOUNTER — NURSE TRIAGE (OUTPATIENT)
Dept: CALL CENTER | Facility: HOSPITAL | Age: 44
End: 2024-09-01
Payer: COMMERCIAL

## 2024-09-01 ENCOUNTER — READMISSION MANAGEMENT (OUTPATIENT)
Dept: CALL CENTER | Facility: HOSPITAL | Age: 44
End: 2024-09-01
Payer: COMMERCIAL

## 2024-09-01 VITALS
TEMPERATURE: 98.7 F | HEIGHT: 73 IN | BODY MASS INDEX: 36.05 KG/M2 | WEIGHT: 272 LBS | RESPIRATION RATE: 16 BRPM | SYSTOLIC BLOOD PRESSURE: 144 MMHG | DIASTOLIC BLOOD PRESSURE: 99 MMHG | HEART RATE: 77 BPM | OXYGEN SATURATION: 95 %

## 2024-09-01 LAB
ACT BLD: 177 SECONDS (ref 89–137)
ACT BLD: 226 SECONDS (ref 89–137)
QT INTERVAL: 432 MS
QTC INTERVAL: 433 MS

## 2024-09-01 PROCEDURE — 93005 ELECTROCARDIOGRAM TRACING: CPT | Performed by: INTERNAL MEDICINE

## 2024-09-01 PROCEDURE — 99232 SBSQ HOSP IP/OBS MODERATE 35: CPT | Performed by: INTERNAL MEDICINE

## 2024-09-01 RX ORDER — METOPROLOL TARTRATE 25 MG/1
25 TABLET, FILM COATED ORAL EVERY 12 HOURS SCHEDULED
Qty: 60 TABLET | Refills: 1 | Status: SHIPPED | OUTPATIENT
Start: 2024-09-01 | End: 2024-09-01 | Stop reason: SDUPTHER

## 2024-09-01 RX ORDER — LOSARTAN POTASSIUM 25 MG/1
25 TABLET ORAL DAILY
Qty: 30 TABLET | Refills: 1 | Status: SHIPPED | OUTPATIENT
Start: 2024-09-01

## 2024-09-01 RX ORDER — NITROGLYCERIN 0.4 MG/1
0.4 TABLET SUBLINGUAL
Qty: 20 TABLET | Refills: 1 | Status: SHIPPED | OUTPATIENT
Start: 2024-09-01 | End: 2024-09-01 | Stop reason: SDUPTHER

## 2024-09-01 RX ORDER — ATORVASTATIN CALCIUM 40 MG/1
40 TABLET, FILM COATED ORAL NIGHTLY
Qty: 90 TABLET | Refills: 1 | Status: SHIPPED | OUTPATIENT
Start: 2024-09-01

## 2024-09-01 RX ORDER — LOSARTAN POTASSIUM 25 MG/1
25 TABLET ORAL DAILY
Qty: 30 TABLET | Refills: 1 | Status: SHIPPED | OUTPATIENT
Start: 2024-09-01 | End: 2024-09-01 | Stop reason: SDUPTHER

## 2024-09-01 RX ORDER — ATORVASTATIN CALCIUM 40 MG/1
40 TABLET, FILM COATED ORAL NIGHTLY
Qty: 90 TABLET | Refills: 1 | Status: SHIPPED | OUTPATIENT
Start: 2024-09-01 | End: 2024-09-01 | Stop reason: SDUPTHER

## 2024-09-01 RX ORDER — METOPROLOL TARTRATE 25 MG/1
25 TABLET, FILM COATED ORAL EVERY 12 HOURS SCHEDULED
Qty: 60 TABLET | Refills: 1 | Status: SHIPPED | OUTPATIENT
Start: 2024-09-01

## 2024-09-01 RX ORDER — ASPIRIN 81 MG/1
81 TABLET ORAL DAILY
Qty: 30 TABLET | Refills: 3 | Status: SHIPPED | OUTPATIENT
Start: 2024-09-02

## 2024-09-01 RX ORDER — NITROGLYCERIN 0.4 MG/1
0.4 TABLET SUBLINGUAL
Qty: 20 TABLET | Refills: 1 | Status: SHIPPED | OUTPATIENT
Start: 2024-09-01

## 2024-09-01 RX ORDER — METOPROLOL TARTRATE 25 MG/1
25 TABLET, FILM COATED ORAL EVERY 12 HOURS SCHEDULED
Status: DISCONTINUED | OUTPATIENT
Start: 2024-09-01 | End: 2024-09-01 | Stop reason: HOSPADM

## 2024-09-01 RX ADMIN — ASPIRIN 81 MG: 81 TABLET, COATED ORAL at 09:05

## 2024-09-01 RX ADMIN — CARVEDILOL 3.12 MG: 3.12 TABLET, FILM COATED ORAL at 09:05

## 2024-09-01 RX ADMIN — TICAGRELOR 90 MG: 90 TABLET ORAL at 09:05

## 2024-09-01 NOTE — DISCHARGE SUMMARY
[facility] Medicine Services  Discharge Summary    Date of Service: 2024  Patient Name: Scar Velásquez  : 1980  MRN: [id number]    Date of Admission: 2024  Discharge Diagnosis:       Acute nontransmural myocardial infarct  Coronary artery disease status post percutaneous intervention  Transaminitis, possible fatty liver  Atrial fibs  Syncope  Date of Discharge: 2024  Primary Care Physician: Gaetano Zhu PA-C      Presenting Problem:   Acute coronary syndrome [I24.9]  Subendocardial MI first episode care [I21.4]  Atrial fibrillation with rapid ventricular response [I48.91]  Syncope, unspecified syncope type [R55]  STEMI (ST elevation myocardial infarction) [I21.3]    Active and Resolved Hospital Problems:  Active Hospital Problems    Diagnosis POA    **Acute coronary syndrome [I24.9] Yes    Attention deficit hyperactivity disorder (ADHD), predominantly inattentive type [F90.0] Yes    Elevated blood pressure reading [R03.0] Yes      Resolved Hospital Problems    Diagnosis POA    STEMI (ST elevation myocardial infarction) [I21.3] Yes         Hospital Course     HPI:  Per the H&P dated 2024        This is a 44-year-old white male who presented with issues as discussed in history and physical.  I was asked to discharge patient after cardiology clearance on my initial day of contact. He remained afebrile during his hospitalization.  Oxygenation was maintained on room air.  Blood pressures remained well-controlled with additional antihypertensives.  No microbiology or radiographic data.  It is noted initial troponin elevation.  He had had syncopal episode and identified atrial fibs on presentation.  He had some moderate liver function abnormalities and had no viral hepatitis studies.  CBC was nonremarkable.  Thyroid functions were not obtained.  Cholesterol level was elevated.  He underwent cardiac catheterization and subsequent stenting as detailed in  catheterization report.  It was thought by cardiology that he could be discharged and followed up in the office.  There was a noted discussion as to a Holter monitor for any recurrence of atrial fibrillation.  He has not felt that he merited anticoagulation at present.  Medication was discussed at time of discharge.  He will continue to monitor blood pressure and record for office review and follow-up.  He otherwise will benefit from cardiac rehab that should be arranged as an outpatient.  He otherwise should follow-up with his liver functions and any further needed evaluation when followed up with primary care.      Vital Signs:  Temp:  [97.8 °F (36.6 °C)-98.7 °F (37.1 °C)] 98.7 °F (37.1 °C)  Heart Rate:  [59-95] 77  Resp:  [16] 16  BP: (109-144)/(77-99) 144/99    Physical Exam:  Physical Exam  Vitals reviewed.   Constitutional:       Appearance: Normal appearance.   HENT:      Head: Normocephalic.   Cardiovascular:      Rate and Rhythm: Normal rate and regular rhythm.   Pulmonary:      Effort: Pulmonary effort is normal.      Breath sounds: Normal breath sounds.   Abdominal:      General: Bowel sounds are normal.      Palpations: Abdomen is soft.      Tenderness: There is no abdominal tenderness.   Musculoskeletal:         General: No swelling.   Neurological:      Mental Status: He is alert. Mental status is at baseline.            Pertinent  and/or Most Recent Results     LAB RESULTS:      Lab 08/31/24  0343 08/30/24  1329   WBC 15.13* 16.46*   HEMOGLOBIN 14.2 14.3   HEMATOCRIT 42.6 42.1   PLATELETS 250 257   NEUTROS ABS  --  13.67*   IMMATURE GRANS (ABS)  --  0.08*   LYMPHS ABS  --  1.61   MONOS ABS  --  0.77   EOS ABS  --  0.24   MCV 88.0 85.7   PROTIME  --  10.7   APTT 28.2* 25.0*         Lab 08/31/24  0343 08/30/24  1329   SODIUM 140 142   POTASSIUM 3.9 3.6   CHLORIDE 104 106   CO2 28.2 23.8   ANION GAP 7.8 12.2   BUN 12 17   CREATININE 1.04 1.25   EGFR 90.8 72.8   GLUCOSE 98 135*   CALCIUM 9.4 10.4    MAGNESIUM  --  2.1         Lab 08/30/24  1329   TOTAL PROTEIN 6.8   ALBUMIN 4.4   GLOBULIN 2.4   ALT (SGPT) 77*   AST (SGOT) 104*   BILIRUBIN 0.3   ALK PHOS 71         Lab 08/30/24  1329   HSTROP T 140*   PROTIME 10.7   INR 0.98         Lab 08/31/24  0343   CHOLESTEROL 236*   LDL CHOL 175*   HDL CHOL 32*   TRIGLYCERIDES 156*             Brief Urine Lab Results       None          Microbiology Results (last 10 days)       ** No results found for the last 240 hours. **                         Results for orders placed during the hospital encounter of 08/30/24    Adult Transthoracic Echo Complete W/ Cont if Necessary Per Protocol    Interpretation Summary    Left ventricular systolic function is normal. Calculated left ventricular EF = 58% Left ventricular ejection fraction appears to be 56 - 60%.    Left ventricular wall thickness is consistent with mild concentric hypertrophy.    Left ventricular diastolic function is consistent with (grade I) impaired relaxation.    The left atrial cavity is mildly dilated.    Estimated right ventricular systolic pressure from tricuspid regurgitation is normal (<35 mmHg).      Labs Pending at Discharge:      Procedures Performed  Procedure(s):  Left Heart Cath         Consults:   Consults       Date and Time Order Name Status Description    8/31/2024  9:28 AM Inpatient Hospitalist Consult Completed     8/30/2024  4:25 PM Inpatient Intensivist Consult                Discharge Details        Discharge Medications        New Medications        Instructions Start Date   aspirin 81 MG EC tablet   81 mg, Oral, Daily   Start Date: September 2, 2024     atorvastatin 40 MG tablet  Commonly known as: LIPITOR   40 mg, Oral, Nightly      losartan 25 MG tablet  Commonly known as: Cozaar   25 mg, Oral, Daily      metoprolol tartrate 25 MG tablet  Commonly known as: LOPRESSOR   25 mg, Oral, Every 12 Hours Scheduled      nitroglycerin 0.4 MG SL tablet  Commonly known as: NITROSTAT   0.4 mg,  Sublingual, Every 5 Minutes PRN, Take no more than 3 doses in 15 minutes.      ticagrelor 90 MG tablet tablet  Commonly known as: BRILINTA   90 mg, Oral, 2 Times Daily             Continue These Medications        Instructions Start Date   lisdexamfetamine 50 MG capsule  Commonly known as: Vyvanse   50 mg, Oral, Every Morning               No Known Allergies      Discharge Disposition: home  Home or Self Care    Diet:  Hospital:  Diet Order   Procedures    Diet: Cardiac; Healthy Heart (2-3 Na+); Fluid Consistency: Thin (IDDSI 0)         Discharge Activity:         CODE STATUS:  Code Status and Medical Interventions: CPR (Attempt to Resuscitate); Full Support   Ordered at: 08/30/24 1704     Level Of Support Discussed With:    Patient     Code Status (Patient has no pulse and is not breathing):    CPR (Attempt to Resuscitate)     Medical Interventions (Patient has pulse or is breathing):    Full Support         No future appointments.    Additional Instructions for the Follow-ups that You Need to Schedule       Discharge Follow-up with PCP   As directed       Currently Documented PCP:    Gaetano Zhu PA-C    PCP Phone Number:    [phone]     Follow Up Details: one week        Discharge Follow-up with Specified Provider: cardiology; 2 Weeks   As directed      To: cardiology   Follow Up: 2 Weeks                Time spent on Discharge including face to face service:  >30 minutes    Signature: Electronically signed by Timothy Duane Brammell, MD, 09/01/24, 12:51 EDT.  [facility] Hospitalist Team

## 2024-09-01 NOTE — OUTREACH NOTE
Prep Survey      Flowsheet Row Responses   [facility] facility patient discharged from? [facility]   Is LACE score < 7 ? Yes   Eligibility [facility]   Date of Admission 08/30/24   Discharge Disposition Home or Self Care   Discharge diagnosis Acute coronary syndrome   STEMI (ST christa   Does the patient have one of the following disease processes/diagnoses(primary or secondary)? Acute MI (STEMI,NSTEMI)   Does the patient have Home health ordered? No   Is there a DME ordered? No   Prep survey completed? Yes            BON JANSEN - Registered Nurse

## 2024-09-01 NOTE — TELEPHONE ENCOUNTER
"Went to  his meds from the requested pharmacy and they are closed. Asking for reroute to [address] in [facility]. Scripts sent. Verified receipt. Patient wife updated     Reason for Disposition   [1] Prescription prescribed recently is not at pharmacy AND [2] triager has access to patient's EMR AND [3] prescription is recorded in the EMR    Additional Information   Negative: New-onset or worsening symptoms, see that guideline (e.g., diarrhea, runny nose, sore throat)   Negative: Medicine question not related to refill or renewal   Negative: Caller (e.g., patient or pharmacist) requesting information about a new medicine   Negative: Caller requesting information unrelated to medicine   Negative: [1] Prescription refill request for ESSENTIAL medicine (i.e., likelihood of harm to patient if not taken) AND [2] triager unable to refill per department policy   Negative: [1] Prescription not at pharmacy AND [2] was prescribed by PCP recently  (Exception: Triager has access to EMR and prescription is recorded there. Go to Home Care and confirm for pharmacy.)   Negative: [1] Pharmacy calling with prescription questions AND [2] triager unable to answer question   Negative: Prescription request for new medicine (not a refill)   Negative: Caller requesting a CONTROLLED substance prescription refill (e.g., narcotics, ADHD medicines)   Negative: [1] Prescription refill request for NON-ESSENTIAL medicine (i.e., no harm to patient if med not taken) AND [2] triager unable to refill per department policy   Negative: [1] Caller has NON-URGENT medicine question about med that PCP prescribed AND [2] triager unable to answer question    Answer Assessment - Initial Assessment Questions  1. DRUG NAME: \"What medicine do you need to have refilled?\"  Went to  his meds from the requested pharmacy and they are closed. Asking for reroute to [address] in [facility]. Scripts sent. Verified receipt. Patient wife updated    See EPIC note " for four meds    Protocols used: Medication Refill and Renewal Call-ADULT-AH

## 2024-09-01 NOTE — NURSING NOTE
Pt and wife walked off unit. Discussed with charge nurse who spoke with house supervisor. Hospitalist did not approve of walking off unit. ICU NP explained why he was not allowed to walk off unit, that he needs to be monitored for his safety and being able to get to him in case of an evenyt. Pt and wife requested to talk to doctor, message sent to Bimal on secure chat, it was seen and never responded to it. Pt walked off unit again and was found by security. Pt returned to unit and wife requested ama paperwork. Message sent to hospitalist NP. Call placed to hospitalist doc.

## 2024-09-01 NOTE — PROGRESS NOTES
Cardiology [facility]      Patient Care Team:  Gaetano Zhu PA-C as PCP - General (Physician Assistant)            Cardiology assessment and plan        Acute coronary syndrome  Shocked by external AED  Abnormal EKG  Intermittent heartburn for 2 weeks with some symptoms last night and this morning  Abnormal EKG with inferior Q waves and nonspecific ST changes  Syncope  Newly diagnosed atrial fibrillation  Abnormal troponin  Atrial fibrillation with controlled ventricular response  Hyperlipidemia  Severe two-vessel coronary artery disease  100% occlusion of the mid right coronary artery culprit vessel for patient acute coronary syndrome and possible arrhythmia and syncope 6 will treat with placement of Xience drug-eluting stent  Significant stenosis involving the mid LAD plan to manage conservatively medical therapy at this time  Echocardiogram to assess LV systolic function  Aspirin 81 mg p.o. once a day  Brilinta 90 mg p.o. twice daily  Continue Integrilin for 1 bottle  Continued aggressive risk factor modification  High-dose statin  Likely will benefit from long-term oral anticoagulation for atrial fibrillation for stroke prophylaxis  Maykel vascular score of 2  Echocardiogram to assess LV systolic function  Diagnosis and treatment options reviewed and discussed with patient  Tmax is 98.2 pulse is 86 respirations are 20 blood pressure is 96/66 to 140/90 sats are 99%  Groin site looks good except for a small bruise  Sodium is 140 potassium is 3.9 creatinine is 1.0 hemoglobin is 14.2  Echocardiogram to assess LV systolic function  Current medications include aspirin 81 mg p.o. once a day Lipitor 40 mg p.o. once a day  Brilinta 90 mg p.o. twice daily  Start patient on low-dose beta-blocker if the blood pressure tolerates  Likely consider starting patient on anticoagulation therapy either tonight or tomorrow morning before discharge  Further recommendation based on patient course          September 1, 2024  Patient is  clinically doing better  Denies any new complaints  Denies any chest pain  Patient is back in sinus rhythm and maintaining sinus rhythm  No recurrence of atrial fibrillation  Hemodynamics are stable  Blood pressure is better  Groin site looks good with no hematoma or bleeding  Clinically feels better  Ambulating without any further discomfort  Tmax is 98.6 pulse is 66-95 respirations are 20 blood pressure is 96/66 to 144/99  Sats are 98%  Sodium is 140 potassium is 3.9 creatinine is 1.0 hemoglobin is 14 point  Plans to avoid anticoagulation for now  Continue antiplatelet therapy with aspirin and Brilinta  Continue Lipitor 40 mg p.o. once a day  Metoprolol 25 mg p.o. twice daily  Started patient on angiotensin receptor blocker as an outpatient as blood pressure tolerates  Recommend cardiac rehab  Extended Holter monitor for 4 weeks for any recurrence of atrial fibrillation  Need for close monitoring and follow-up reviewed and discussed with patient and family  Okay to discharge from cardiac standpoint  Follow-up in office in 2 weeks              Chief Complaint: Chest discomfort syncope and AED shock    Subjective no new complaints    Interval History: No significant change in the overall    History taken from: patient    Review of Systems:  Review of Systems   Constitutional: Negative for chills, decreased appetite and malaise/fatigue.   HENT:  Negative for congestion and nosebleeds.    Eyes:  Negative for blurred vision and double vision.   Cardiovascular:  Negative for chest pain, dyspnea on exertion, irregular heartbeat, leg swelling, near-syncope, orthopnea, palpitations and paroxysmal nocturnal dyspnea.   Respiratory:  Negative for cough and shortness of breath.    Hematologic/Lymphatic: Negative for adenopathy. Does not bruise/bleed easily.   Skin:  Negative for color change and rash.   Musculoskeletal:  Negative for back pain and joint pain.   Gastrointestinal:  Negative for bloating, abdominal pain,  "hematemesis and hematochezia.   Genitourinary:  Negative for flank pain and hematuria.   Neurological:  Negative for dizziness and focal weakness.   Psychiatric/Behavioral:  Negative for altered mental status and memory loss.        Objective    Vital Signs  Visit Vitals  /99 (BP Location: Right arm, Patient Position: Sitting)   Pulse 74   Temp 98.7 °F (37.1 °C) (Oral)   Resp 16   Ht 185.4 cm (73\")   Wt 123 kg (272 lb)   SpO2 95%   BMI 35.89 kg/m²     Oxygen Therapy  SpO2: 95 %  Pulse Oximetry Type: Continuous  Device (Oxygen Therapy): room air  Flow (L/min): 2  Flowsheet Rows      Flowsheet Row First Filed Value   Admission Height 185.4 cm (73\") Documented at 08/30/2024 1328   Admission Weight 123 kg (272 lb) Documented at 08/30/2024 1328          Intake & Output (last 3 days)         08/28 0701  08/29 0700 08/29 0701  08/30 0700 08/30 0701  08/31 0700 08/31 0701  09/01 0700    P.O.   480     I.V. (mL/kg)   190 (1.5)     Total Intake(mL/kg)   670 (5.4)     Urine (mL/kg/hr)   625     Emesis/NG output   0     Stool   0     Total Output   625     Net   +45             Urine Unmeasured Occurrence   1 x     Stool Unmeasured Occurrence   0 x     Emesis Unmeasured Occurrence   1 x           Lines, Drains & Airways       Active LDAs       Name Placement date Placement time Site Days    Peripheral IV 08/30/24 1330 Left Antecubital 08/30/24  1330  Antecubital  less than 1                    Physical Exam:  Physical Exam    Results Review:     I reviewed the patient's new clinical results.    Lab Results (last 24 hours)       Procedure Component Value Units Date/Time    POC Activated Clotting Time [[id number]]  (Abnormal) Collected: 08/30/24 1538    Specimen: Arterial Blood Updated: 09/01/24 0015     Activated Clotting Time  226 Seconds      Comment: Serial Number: [id number]:  [id number]       POC Activated Clotting Time [[id number]]  (Abnormal) Collected: 08/30/24 1503    Specimen: Arterial Blood Updated: 09/01/24 0015 "     Activated Clotting Time  177 Seconds      Comment: Serial Number: [id number]:  [id number]       POC Glucose Once [[id number]]  (Abnormal) Collected: 08/31/24 1932    Specimen: Blood Updated: 08/31/24 1934     Glucose 144 mg/dL      Comment: Serial Number: [id number]:  [id number]             No results found for this or any previous visit.        Medication Review:   I have reviewed the patient's current medication list  Scheduled Meds:aspirin, 81 mg, Oral, Daily  atorvastatin, 40 mg, Oral, Nightly  metoprolol tartrate, 25 mg, Oral, Q12H  ticagrelor, 90 mg, Oral, BID      Continuous Infusions:     PRN Meds:.  acetaminophen    aluminum-magnesium hydroxide-simethicone    atropine    nitroglycerin    ondansetron ODT **OR** ondansetron    [COMPLETED] Insert Peripheral IV **AND** sodium chloride    sodium chloride    ECG/EMG Results (last 24 hours)       Procedure Component Value Units Date/Time    ECG 12 Lead Tachycardia [[id number]] Collected: 08/30/24 1406     Updated: 08/30/24 1516     QT Interval 380 ms      QTC Interval 496 ms     Narrative:      HEART SCRA=691  bpm  RR Jigmanji=796  ms  AL Interval=  ms  P Horizontal Axis=  deg  P Front Axis=  deg  QRSD Abiunlol=672  ms  QT Ehtaxcfm=091  ms  WUlY=460  ms  QRS Axis=5  deg  T Wave Axis=-33  deg  - ABNORMAL ECG -  Atrial fibrillation  Inferior  infarct, age indeterminate  Electronically Signed By: Manuel Church (MIGUELINA) 2024-08-30 15:16:31  Date and Time of Study:2024-08-30 14:06:51    Telemetry Scan [[id number]] Resulted: 08/30/24     Updated: 08/30/24 1703    Telemetry Scan [[id number]] Resulted: 08/30/24     Updated: 08/30/24 1720    ECG 12 Lead Rhythm Change [[id number]] Collected: 08/30/24 1615     Updated: 08/30/24 1817     QT Interval 366 ms      QTC Interval 504 ms     Narrative:      HEART UYUJ=174  bpm  RR Uoctazcc=628  ms  AL Interval=  ms  P Horizontal Axis=  deg  P Front Axis=  deg  QRSD Xeaqpaqw=771  ms  QT Bayesgjw=518  ms  TDeM=169  ms  QRS Axis=13   deg  T Wave Axis=-42  deg  - ABNORMAL ECG -  Atrial fibrillation  Inferior  infarct, age indeterminate  Prolonged QT interval  Electronically Signed By: Stef Hercules ([facility]) 2024-08-30 18:13:35  Date and Time of Study:2024-08-30 16:15:31    Telemetry Scan [[id number]] Resulted: 08/30/24     Updated: 08/30/24 2309    Telemetry Scan [[id number]] Resulted: 08/30/24     Updated: 08/31/24 0313    ECG 12 Lead Rhythm Change [[id number]] Collected: 08/31/24 0339     Updated: 08/31/24 0340     QT Interval 425 ms      QTC Interval 443 ms     Narrative:      HEART RATE=65  bpm  RR Zbhlntdw=238  ms  CO Interval=  ms  P Horizontal Axis=  deg  P Front Axis=  deg  QRSD Interval=98  ms  QT Kfuisxuy=985  ms  RWcS=952  ms  QRS Axis=-28  deg  T Wave Axis=-31  deg  - ABNORMAL ECG -  Atrial fibrillation  Inferior infarct, age indeterminate  Date and Time of Study:2024-08-31 03:39:35    ECG 12 Lead Chest Pain [[id number]] Collected: 08/30/24 1326     Updated: 08/31/24 0712     QT Interval 392 ms      QTC Interval 534 ms     Narrative:      HEART QAPF=012  bpm  RR Otbdjsxr=847  ms  CO Interval=  ms  P Horizontal Axis=  deg  P Front Axis=  deg  QRSD Wlxocthy=484  ms  QT Rqwsrelh=768  ms  YEyH=402  ms  QRS Axis=8  deg  T Wave Axis=91  deg  - ABNORMAL ECG -  Atrial fibrillation  Inferior  infarct, old  Nonspecific  T abnormalities, lateral leads  Prolonged QT interval  Electronically Signed By: Manuel Church ([facility]) 2024-08-31 07:12:28  Date and Time of Study:2024-08-30 13:26:10            Imaging Results (Last 24 Hours)       ** No results found for the last 24 hours. **          Results for orders placed during the hospital encounter of 08/30/24    Cardiac Catheterization/Vascular Study    Conclusion  Table formatting from the original result was not included.  Cardiac Catheterization with PCI Report    Scar PACHECO Didier  [id number]  8/30/2024  @PCP@    He underwent cardiac catheterization and percutaneous coronary intervention.    Indications  for the procedure include: acute coronary syndrome.  Acute myocardial infarction  Chest discomfort  External AICD shock for possible ventricular arrhythmia  Syncope  Abnormal troponin  Abnormal EKG    Procedure Details:  The risks, benefits, complications, treatment options, and expected outcomes were discussed with the patient. The patient and/or family concurred with the proposed plan, giving informed consent.    After informed consent the patient was brought to the cath lab after appropriate IV hydration was begun and oral premedication was given. He was further sedated with fentanyl. He was prepped and draped in the usual manner. Using the modified Seldinger access technique, a 6 [place] sheath was placed in the femoral artery. A left heart catheterization with coronary arteriography was performed. Findings are discussed below.    The decision was made to proceed with intervention. He received Heparin as per protocol. The details of the intervention are as follows:    For the interventional procedure we used a JR4 guide catheter with a BMW guidewire initially and then eventually switched to [employer] Secure wire Entroease rebellion support across the lesion in the distal right coronary artery lesion is predilated with a 3.0 x 12 mm noncompliant balloon up to 8 isabella and lesion is stented with a 3.5 x 23 mm Xience drug-eluting stent that was deployed at 10 isabella with good angiographic results.  Patient tolerated procedure well with no immediate possible complication plan to obtain hemostasis by manual pressure.  Residual anticoagulation was heparin patient received Integrilin bolus and drip at the end of the procedure.  Patient also received 180 mg of Brilinta at the end of the procedure.    After the procedure was completed, sedation was stopped and the sheaths and catheters were all removed according to established hospital protocols.    Conscious sedation:  Conscious sedation was performed according to protocol.  I  supervised and directed an independent trained observer with the assistance of monitoring the patient's level of consciousness and physiologic status throughout the procedure.  Intraoperative service time was 90 minutes.    Findings:    Hemodynamics Central aortic pressure systolic 150 and diastolic 82 with a mean pressure of 90 mmHg  LV end-diastolic pressure of 20 mmHg  There was no gradient across the aortic valve on the pullback of the pigtail catheter  Left Main Large-caliber vessel angiographically normal bifurcates into left into descending and left circumflex arteries  RCA Large-caliber dominant vessel with 100% occlusion in the distal right coronary artery culprit vessel for patient acute coronary syndrome successfully treated with placement of a Xience drug-eluting stent  LAD Large-caliber vessel  LAD has mid focal area of angiographic 80% stenosis at the diagonal bifurcation  Ostium of the diagonal branch has angiographic 50% stenosis  Apical LAD has another focal area of angiographic 50% stenosis with some intramyocardial bridging  Circ Large-caliber vessel mid focal angiographic 30% stenosis otherwise rest of the left circumflex artery is angiographically normal  Marginal branch of left circumflex artery is a large-caliber vessel angiographically normal  LV Not done  Coronary dominance Right coronary artery    Interventions/Vessels Accessible PCI and stenting of the distal right coronary artery with placement of a Xience drug-eluting stent  Guides/Wires BMW and water guidewire  Devices Xience drug-eluting stent 3.5 x 23 mm  Post % Stenosis  0  Pre-procedure CARL flow  0  Post procedure CARL flow  3  Closure Device None  Complications None    Estimated Blood Loss:  Minimal    Specimens: None    Complications:  None; patient tolerated the procedure well.    Disposition: PACU - hemodynamically stable.    Condition: stable    Impressions:  Severe two-vessel coronary artery disease  100% occlusion of the mid  right coronary artery culprit vessel for patient acute coronary syndrome and possible arrhythmia and syncope 6 will treat with placement of Xience drug-eluting stent  Significant stenosis involving the mid LAD plan to manage conservatively medical therapy at this time  Echocardiogram to assess LV systolic function    Recommendations:  Aspirin 81 mg p.o. once a day  Brilinta 90 mg p.o. twice daily  Continue Integrilin for 1 bottle  Continued aggressive risk factor modification  Observe patient in CVU bed overnight  Test results reviewed and discussed with patient and family     No results found for this or any previous visit.     Lab Results   Component Value Date    GLUCOSE 98 08/31/2024    BUN 12 08/31/2024    CREATININE 1.04 08/31/2024    EGFR 90.8 08/31/2024    BCR 11.5 08/31/2024    K 3.9 08/31/2024    CO2 28.2 08/31/2024    CALCIUM 9.4 08/31/2024    ALBUMIN 4.4 08/30/2024    BILITOT 0.3 08/30/2024     (H) 08/30/2024    ALT 77 (H) 08/30/2024      Lab Results   Component Value Date    CHOL 236 (H) 08/31/2024    TRIG 156 (H) 08/31/2024    HDL 32 (L) 08/31/2024     (H) 08/31/2024      Lab Results   Component Value Date    TROPONINT 140 (C) 08/30/2024        Assessment & Plan       Acute coronary syndrome    Elevated blood pressure reading    Attention deficit hyperactivity disorder (ADHD), predominantly inattentive type        Stef Hercules MD  09/01/24  09:12 EDT

## 2024-09-01 NOTE — TELEPHONE ENCOUNTER
Patient having difficulty getting prescriptions. Initially sent to [facility] which is not open. Resent to [facility], but they were unable to process the patient's insurance due to the prescription being locked up in [employer] system. During call, patient was able to cancel prescriptions through [employer] geo. [facility] has still not received Metoprolol or Losartan prescriptions. Prescriptions for Metoprolol and Losartan. Called [facility], spoke with Abbie Pharmacist. Would not allow me to call prescription directly to her, left information on voicemail as requested.     Reason for Disposition   [1] Caller has URGENT medicine question about med that PCP or specialist prescribed AND [2] triager unable to answer question   [1] Pharmacy calling with prescription question AND [2] triager unable to answer question    Additional Information   Negative: [1] Intentional drug overdose AND [2] suicidal thoughts or ideas   Negative: Drug overdose and triager unable to answer question   Negative: Caller requesting a renewal or refill of a medicine patient is currently taking   Negative: Caller requesting information unrelated to medicine   Negative: Caller requesting information about COVID-19 Vaccine   Negative: Caller requesting information about Emergency Contraception   Negative: Caller requesting information about Combined Birth Control Pills   Negative: Caller requesting information about Progestin Birth Control Pills   Negative: Caller requesting information about Post-Op pain or medicines   Negative: Caller requesting a prescription antibiotic (such as Penicillin) for Strep throat and has a positive culture result   Negative: Caller requesting a prescription anti-viral med (such as Tamiflu) and has influenza (flu) symptoms   Negative: Immunization reaction suspected   Negative: Rash while taking a medicine or within 3 days of stopping it   Negative: [1] Asthma and [2] having symptoms of asthma  "(cough, wheezing, etc.)   Negative: [1] Symptom of illness (e.g., headache, abdominal pain, earache, vomiting) AND [2] more than mild   Negative: Breastfeeding questions about mother's medicines and diet   Negative: MORE THAN A DOUBLE DOSE of a prescription or over-the-counter (OTC) drug   Negative: [1] DOUBLE DOSE (an extra dose or lesser amount) of prescription drug AND [2] any symptoms (e.g., dizziness, nausea, pain, sleepiness)   Negative: [1] DOUBLE DOSE (an extra dose or lesser amount) of over-the-counter (OTC) drug AND [2] any symptoms (e.g., dizziness, nausea, pain, sleepiness)   Negative: Took another person's prescription drug   Negative: [1] DOUBLE DOSE (an extra dose or lesser amount) of prescription drug AND [2] NO symptoms  (Exception: A double dose of antibiotics.)   Negative: Diabetes drug error or overdose (e.g., took wrong type of insulin or took extra dose)   Negative: [1] Prescription not at pharmacy AND [2] was prescribed by PCP recently (Exception: Triager has access to EMR and prescription is recorded there. Go to Home Care and confirm for pharmacy.)    Answer Assessment - Initial Assessment Questions  1. NAME of MEDICINE: \"What medicine(s) are you calling about?\"      Discharge medications  2. QUESTION: \"What is your question?\" (e.g., double dose of medicine, side effect)      Issues getting medications due to pharmacy hours  3. PRESCRIBER: \"Who prescribed the medicine?\" Reason: if prescribed by specialist, call should be referred to that group.      Dr. Morris  4. SYMPTOMS: \"Do you have any symptoms?\" If Yes, ask: \"What symptoms are you having?\"  \"How bad are the symptoms (e.g., mild, moderate, severe)      N/A  5. PREGNANCY:  \"Is there any chance that you are pregnant?\" \"When was your last menstrual period?\"      N/A    Protocols used: Medication Question Call-ADULT-    "

## 2024-09-01 NOTE — TELEPHONE ENCOUNTER
Caller/Wife states [facility] pharmacy did not have all meds that were prescribed at discharge. Reviewed with caller med list on AVS in EPIC. Found that patient did not receive the following meds as ordered:  1) Atorvastatin  2) Losartan  3)Metoprolol  4) Nitroglycerin    These meds reviewed in EPIC chart and reodered/e-xcribed to [address] as requested per caller.    Wife was notified when completed.    Reason for Disposition   [1] Prescription prescribed recently is not at pharmacy AND [2] triager has access to patient's EMR AND [3] prescription is recorded in the EMR    Additional Information   Negative: [1] Intentional drug overdose AND [2] suicidal thoughts or ideas   Negative: Drug overdose and triager unable to answer question   Negative: Caller requesting a renewal or refill of a medicine patient is currently taking   Negative: Caller requesting information unrelated to medicine   Negative: Caller requesting information about COVID-19 Vaccine   Negative: Caller requesting information about Emergency Contraception   Negative: Caller requesting information about Combined Birth Control Pills   Negative: Caller requesting information about Progestin Birth Control Pills   Negative: Caller requesting information about Post-Op pain or medicines   Negative: Caller requesting a prescription antibiotic (such as Penicillin) for Strep throat and has a positive culture result   Negative: Caller requesting a prescription anti-viral med (such as Tamiflu) and has influenza (flu) symptoms   Negative: Immunization reaction suspected   Negative: Rash while taking a medicine or within 3 days of stopping it   Negative: [1] Asthma and [2] having symptoms of asthma (cough, wheezing, etc.)   Negative: [1] Symptom of illness (e.g., headache, abdominal pain, earache, vomiting) AND [2] more than mild   Negative: Breastfeeding questions about mother's medicines and diet   Negative: MORE THAN A DOUBLE DOSE of a prescription  "or over-the-counter (OTC) drug   Negative: [1] DOUBLE DOSE (an extra dose or lesser amount) of prescription drug AND [2] any symptoms (e.g., dizziness, nausea, pain, sleepiness)   Negative: [1] DOUBLE DOSE (an extra dose or lesser amount) of over-the-counter (OTC) drug AND [2] any symptoms (e.g., dizziness, nausea, pain, sleepiness)   Negative: Took another person's prescription drug   Negative: [1] DOUBLE DOSE (an extra dose or lesser amount) of prescription drug AND [2] NO symptoms  (Exception: A double dose of antibiotics.)   Negative: Diabetes drug error or overdose (e.g., took wrong type of insulin or took extra dose)   Negative: [1] Prescription not at pharmacy AND [2] was prescribed by PCP recently (Exception: Triager has access to EMR and prescription is recorded there. Go to Home Care and confirm for pharmacy.)   Negative: [1] Pharmacy calling with prescription question AND [2] triager unable to answer question   Negative: [1] Caller has URGENT medicine question about med that PCP or specialist prescribed AND [2] triager unable to answer question   Negative: Medicine patch causing local rash or itching   Negative: [1] Caller has medicine question about med NOT prescribed by PCP AND [2] triager unable to answer question (e.g., compatibility with other med, storage)   Negative: Prescription request for new medicine (not a refill)   Negative: [1] Caller has NON-URGENT medicine question about med that PCP prescribed AND [2] triager unable to answer question   Negative: Caller wants to use a complementary or alternative medicine   Negative: [1] DOUBLE DOSE (an extra dose or lesser amount) of over-the-counter (OTC) drug AND [2] NO symptoms   Negative: [1] DOUBLE DOSE (an extra dose or lesser amount) of antibiotic drug AND [2] NO symptoms    Answer Assessment - Initial Assessment Questions  1. NAME of MEDICINE: \"What medicine(s) are you calling about?\"     Atorvastatin, Losartan, metoprolol, Nitroglycerin  2. " "QUESTION: \"What is your question?\" (e.g., double dose of medicine, side effect)      [facility] does not have these avaiable and nees to be sent to [address]  3. PRESCRIBER: \"Who prescribed the medicine?\" Reason: if prescribed by specialist, call should be referred to that group.      Dr. Joaquin Morris  4. SYMPTOMS: \"Do you have any symptoms?\" If Yes, ask: \"What symptoms are you having?\"  \"How bad are the symptoms (e.g., mild, moderate, severe)      No. These are meds ordered upon discharge today  5. PREGNANCY:  \"Is there any chance that you are pregnant?\" \"When was your last menstrual period?\"    Protocols used: Medication Question Call-ADULT-    "

## 2024-09-01 NOTE — TELEPHONE ENCOUNTER
"Reason for Disposition   General information question, no triage required and triager able to answer question    Additional Information   Negative: [1] Caller is not with the adult (patient) AND [2] reporting urgent symptoms   Negative: Lab result questions   Negative: Medication questions   Negative: Caller can't be reached by phone   Negative: Caller has already spoken to PCP or another triager   Negative: RN needs further essential information from caller in order to complete triage   Negative: Requesting regular office appointment   Negative: [1] Caller requesting NON-URGENT health information AND [2] PCP's office is the best resource   Negative: Health Information question, no triage required and triager able to answer question    Answer Assessment - Initial Assessment Questions  1. REASON FOR CALL or QUESTION: \"What is your reason for calling today?\" or \"How can I best help you?\" or \"What question do you have that I can help answer?Calling to see if we knew of other pharmacy's in the area that said open past 6pm.    Protocols used: Information Only Call - No Triage-ADULT-    "

## 2024-09-01 NOTE — NURSING NOTE
House supervisor and Dr. Wallis at bedside. They and the pt are agreeable to walking with house supervisor when pt would like to go outside of the unit. Pt agreeable to stay.

## 2024-09-02 NOTE — CASE MANAGEMENT/SOCIAL WORK
Case Management Discharge Note      Final Note: Home prior to CM assessment         Selected Continued Care - Discharged on 9/1/2024 Admission date: 8/30/2024 - Discharge disposition: Home or Self Care         Transportation Services  Private: Car    Final Discharge Disposition Code: 01 - home or self-care

## 2024-09-03 ENCOUNTER — TELEPHONE (OUTPATIENT)
Dept: CARDIOLOGY | Facility: CLINIC | Age: 44
End: 2024-09-03

## 2024-09-03 ENCOUNTER — TRANSITIONAL CARE MANAGEMENT TELEPHONE ENCOUNTER (OUTPATIENT)
Dept: CALL CENTER | Facility: HOSPITAL | Age: 44
End: 2024-09-03
Payer: COMMERCIAL

## 2024-09-03 NOTE — PAYOR COMM NOTE
"ER ADMISSION    INPATIENT ORDER PLACED 24    DISCHARGED HOME ROUTINE ON 24      Scar Ruggiero (44 y.o. Male)       Date of Birth   1980    Social Security Number       [address] [facility] [place] [address]    Home Phone   [phone]    MRN   [id number]       Sabianist   None    Marital Status                               Admission Date   24    Admission Type   Emergency    Admitting Provider   Stef Hercules MD    Attending Provider       Department, Room/Bed   [facility] CARDIOVASCULAR CARE UNIT,        Discharge Date   2024    Discharge Disposition   Home or Self Care    Discharge Destination                                 Attending Provider: (none)   Allergies: No Known Allergies    Isolation: None   Infection: None   Code Status: Prior    Ht: 185.4 cm (73\")   Wt: 123 kg (272 lb)    Admission Cmt: None   Principal Problem: Acute coronary syndrome [I24.9]                   Active Insurance as of 2024       Primary Coverage       Payor Plan Insurance Group Employer/Plan Group    [employer] [facility] [employer]UE [employer] PPO [id number]       Payor Plan Address Payor Plan Phone Number Payor Plan Fax Number Effective Dates    [address] [phone]  2018 - None Entered    [place] [address]         Subscriber Name Subscriber Birth Date Member ID       SCAR RUGGIERO 1980 [id number]                     Emergency Contacts        (Rel.) Home Phone Work Phone Mobile Phone    JESS RUGGIERO (Spouse) [phone] -- [phone]                 History & Physical        Haley Garcia APRN at 24 1706       Attestation signed by Dre Church DO at 24 0853    I have reviewed this documentation and agree.                    Critical Care History and Physical     Scar Ruggiero : 1980 MRN:[id number] LOS:0 ROOM:      Reason for admission: Acute coronary syndrome     Assessment " "/ Plan     Acute coronary syndrome  Shocked by external AED  S/p emergent cath --> Two-vessel CAD, 100% occlusion of the mid right coronary artery culprit vessel, stent x 1, significant stenosis involving the mid LAD plan to mange conservatively with medical therapy  Echo pending  Continue aspirin, brilinta  Integrilin per protocol post cath  Aggressive risk modifications  Observe in CVU overnight    New onset a-fib  Cardiology following    Chronic:  ADHD --- continue home vyvanse when medically appropriate    Level Of Support Discussed With: Patient  Code Status (Patient has no pulse and is not breathing): CPR (Attempt to Resuscitate)  Medical Interventions (Patient has pulse or is breathing): Full Support     Nutrition:   Diet: Cardiac; Healthy Heart (2-3 Na+); Fluid Consistency: Thin (IDDSI 0)     VTE Prophylaxis:  No VTE prophylaxis order currently exists.    History of Present illness     Scar Velásquez is a 44 y.o. male with PMH of ADHD, HTN, presented to the hospital after he collapsed at work. The patient received bystander CPR and was placed on AED which delivered a shock. The patient was brought to [facility] ER and taken to cathlab. The patient received a stent x 1 to the RCA.    Patient states that overnight he was experiencing what felt like indigestion. Symptoms will be worse with laying down so he slept in a recliner overnight.He stated that at one point he woke up and was soaking wet with from sweat. He reported that he did take a full strength bayor aspirin during the night. And this morning while at work he took some tums. Patient stated he was able to work but towards the end of his shift he remembers feeling lightheaded and things becoming \"fuzzy.\" The next thing he remembered was waking up and to EMS being there. Patient denies any known cardiac history. Patient denies any previous episodes of symptoms. He reports that he vapes daily. He drinks rarely, and does not use smokeless tobacco or smoke. "     Patient was admitted to CVU following intervention for further monitoring overnight.      ACP: CPR, Full Intervention. Patient's spouse is listed as his decision maker in the event he is unable.     Patient was seen and examined on 08/30/24 at 17:06 EDT .    Subjective / Review of systems     Review of Systems   Constitutional:  Negative for chills and fever.   Respiratory:  Negative for chest tightness and shortness of breath.    Cardiovascular:  Negative for chest pain.   Gastrointestinal:  Negative for abdominal pain, nausea and vomiting.   Genitourinary:  Negative for difficulty urinating.   Musculoskeletal:  Negative for arthralgias and myalgias.   Neurological:  Negative for dizziness and light-headedness.        Past Medical/Surgical/Social/Family History & Allergies     Past Medical History:   Diagnosis Date    Biceps rupture, distal     Chondromalacia, patella     Elbow fracture, left     Essential (primary) hypertension 10/11/2017    Hearing loss     Obesity 12/21/2015      Past Surgical History:   Procedure Laterality Date    DISTAL BICEPS TENDON REPAIR Right 2/4/2020    Procedure: TENDON DISTAL BICEPS REPAIR;  Surgeon: Richard Berger MD;  Location: [facility];  Service: Orthopedics;  Laterality: Right;    EAR TUBES      TONSILLECTOMY        Social History     Socioeconomic History    Marital status:    Tobacco Use    Smoking status: Former     Types: Electronic Cigarette     Quit date: 2014     Years since quitting: 10.6     Passive exposure: Past    Smokeless tobacco: Never    Tobacco comments:     10-15 times per day   Vaping Use    Vaping status: Former    Quit date: 6/14/2019   Substance and Sexual Activity    Alcohol use: Not Currently     Comment: rare  2 times per year    Drug use: Never    Sexual activity: Yes     Partners: Female     Birth control/protection: Ring      Family History   Problem Relation Age of Onset    Cancer Mother         Ovarian    Cancer Father          Prostate    Coronary artery disease Father       No Known Allergies   Social Determinants of Health     Tobacco Use: Medium Risk (8/30/2024)    Patient History     Smoking Tobacco Use: Former     Smokeless Tobacco Use: Never     Passive Exposure: Past   Alcohol Use: Not At Risk (8/30/2024)    AUDIT-C     Frequency of Alcohol Consumption: Never     Average Number of Drinks: Patient does not drink     Frequency of Binge Drinking: Never   Financial Resource Strain: Not on file   Food Insecurity: Not on file   Transportation Needs: Not on file   Physical Activity: Not on file   Stress: Not on file   Social Connections: Unknown (10/9/2023)    Family and Community Support     Help with Day-to-Day Activities: Not on file     Lonely or Isolated: Not on file   Interpersonal Safety: Not At Risk (8/30/2024)    Abuse Screen     Unsafe at Home or Work/School: no     Feels Threatened by Someone?: no     Does Anyone Keep You from Contacting Others or Doint Things Outside the Home?: no     Physical Sign of Abuse Present: no   Depression: Not at risk (5/6/2024)    PHQ-2     PHQ-2 Score: 0   Housing Stability: Unknown (8/30/2024)    Housing Stability     Current Living Arrangements: home     Potentially Unsafe Housing Conditions: Not on file   Utilities: Not on file   Health Literacy: Unknown (10/9/2023)    Education     Help with school or training?: Not on file     Preferred Language: Not on file   Employment: Unknown (10/9/2023)    Employment     Do you want help finding or keeping work or a job?: Not on file   Disabilities: Not At Risk (8/30/2024)    Disabilities     Concentrating, Remembering, or Making Decisions Difficulty: no     Doing Errands Independently Difficulty: no        Home Medications     Prior to Admission medications    Medication Sig Start Date End Date Taking? Authorizing Provider   lisdexamfetamine (Vyvanse) 50 MG capsule Take 1 capsule by mouth Every Morning 8/6/24  Yes Gaetano Zhu PA-C        Objective  / Physical Exam     Vital signs:  Temp: 97.8 °F (36.6 °C)  BP: 105/81  Heart Rate: 106  Resp: 22  SpO2: 100 %  Weight: 124 kg (272 lb 7.8 oz)    Admission Weight: Weight: 123 kg (272 lb)    Physical Exam  Vitals and nursing note reviewed.   Constitutional:       General: He is not in acute distress.     Appearance: He is not ill-appearing.   HENT:      Head: Normocephalic.      Mouth/Throat:      Mouth: Mucous membranes are moist.      Pharynx: Oropharynx is clear.   Eyes:      Extraocular Movements: Extraocular movements intact.      Conjunctiva/sclera: Conjunctivae normal.      Pupils: Pupils are equal, round, and reactive to light.   Cardiovascular:      Rate and Rhythm: Normal rate. Rhythm irregular.      Pulses: Normal pulses.      Heart sounds: Normal heart sounds.   Pulmonary:      Effort: Pulmonary effort is normal.      Breath sounds: Normal breath sounds.   Abdominal:      General: Bowel sounds are normal.      Palpations: Abdomen is soft.   Musculoskeletal:      Right lower leg: No edema.      Left lower leg: No edema.   Skin:     General: Skin is warm and dry.      Findings: No rash.   Neurological:      Mental Status: He is alert and oriented to person, place, and time.   Psychiatric:         Mood and Affect: Mood normal.         Behavior: Behavior normal.          Labs     Results from last 7 days   Lab Units 08/30/24  1329   WBC 10*3/mm3 16.46*   HEMATOCRIT % 42.1   PLATELETS 10*3/mm3 257      Results from last 7 days   Lab Units 08/30/24  1329   SODIUM mmol/L 142   POTASSIUM mmol/L 3.6   CHLORIDE mmol/L 106   CO2 mmol/L 23.8   BUN mg/dL 17   CREATININE mg/dL 1.25        Imaging     No radiology results for the last day      Current Medications     Scheduled Meds:  aspirin, 81 mg, Oral, Daily  atorvastatin, 40 mg, Oral, Nightly  dilTIAZem, 10 mg, Intravenous, Once  ticagrelor, 90 mg, Oral, BID         Continuous Infusions:  dilTIAZem, 5-15 mg/hr  eptifibatide, 2 mcg/kg/min           Haley  HEAVENLY Garcia   Critical Care  08/30/24   17:06 EDT      Electronically signed by Dre Church DO at 08/31/24 0853       Operative/Procedure Notes (last 5 days)  Notes from 08/29/24 0841 through 09/03/24 0841   No notes of this type exist for this encounter.          Physician Progress Notes (last 5 days)        Stef Hercules MD at 09/01/24 0912          Cardiology [facility]      Patient Care Team:  Gaetano Zhu PA-C as PCP - General (Physician Assistant)            Cardiology assessment and plan        Acute coronary syndrome  Shocked by external AED  Abnormal EKG  Intermittent heartburn for 2 weeks with some symptoms last night and this morning  Abnormal EKG with inferior Q waves and nonspecific ST changes  Syncope  Newly diagnosed atrial fibrillation  Abnormal troponin  Atrial fibrillation with controlled ventricular response  Hyperlipidemia  Severe two-vessel coronary artery disease  100% occlusion of the mid right coronary artery culprit vessel for patient acute coronary syndrome and possible arrhythmia and syncope 6 will treat with placement of Xience drug-eluting stent  Significant stenosis involving the mid LAD plan to manage conservatively medical therapy at this time  Echocardiogram to assess LV systolic function  Aspirin 81 mg p.o. once a day  Brilinta 90 mg p.o. twice daily  Continue Integrilin for 1 bottle  Continued aggressive risk factor modification  High-dose statin  Likely will benefit from long-term oral anticoagulation for atrial fibrillation for stroke prophylaxis  Maykel vascular score of 2  Echocardiogram to assess LV systolic function  Diagnosis and treatment options reviewed and discussed with patient  Tmax is 98.2 pulse is 86 respirations are 20 blood pressure is 96/66 to 140/90 sats are 99%  Groin site looks good except for a small bruise  Sodium is 140 potassium is 3.9 creatinine is 1.0 hemoglobin is 14.2  Echocardiogram to assess LV systolic function  Current  medications include aspirin 81 mg p.o. once a day Lipitor 40 mg p.o. once a day  Brilinta 90 mg p.o. twice daily  Start patient on low-dose beta-blocker if the blood pressure tolerates  Likely consider starting patient on anticoagulation therapy either tonight or tomorrow morning before discharge  Further recommendation based on patient course          September 1, 2024  Patient is clinically doing better  Denies any new complaints  Denies any chest pain  Patient is back in sinus rhythm and maintaining sinus rhythm  No recurrence of atrial fibrillation  Hemodynamics are stable  Blood pressure is better  Groin site looks good with no hematoma or bleeding  Clinically feels better  Ambulating without any further discomfort  Tmax is 98.6 pulse is 66-95 respirations are 20 blood pressure is 96/66 to 144/99  Sats are 98%  Sodium is 140 potassium is 3.9 creatinine is 1.0 hemoglobin is 14 point  Plans to avoid anticoagulation for now  Continue antiplatelet therapy with aspirin and Brilinta  Continue Lipitor 40 mg p.o. once a day  Metoprolol 25 mg p.o. twice daily  Started patient on angiotensin receptor blocker as an outpatient as blood pressure tolerates  Recommend cardiac rehab  Extended Holter monitor for 4 weeks for any recurrence of atrial fibrillation  Need for close monitoring and follow-up reviewed and discussed with patient and family  Okay to discharge from cardiac standpoint  Follow-up in office in 2 weeks              Chief Complaint: Chest discomfort syncope and AED shock    Subjective no new complaints    Interval History: No significant change in the overall    History taken from: patient    Review of Systems:  Review of Systems   Constitutional: Negative for chills, decreased appetite and malaise/fatigue.   HENT:  Negative for congestion and nosebleeds.    Eyes:  Negative for blurred vision and double vision.   Cardiovascular:  Negative for chest pain, dyspnea on exertion, irregular heartbeat, leg swelling,  "near-syncope, orthopnea, palpitations and paroxysmal nocturnal dyspnea.   Respiratory:  Negative for cough and shortness of breath.    Hematologic/Lymphatic: Negative for adenopathy. Does not bruise/bleed easily.   Skin:  Negative for color change and rash.   Musculoskeletal:  Negative for back pain and joint pain.   Gastrointestinal:  Negative for bloating, abdominal pain, hematemesis and hematochezia.   Genitourinary:  Negative for flank pain and hematuria.   Neurological:  Negative for dizziness and focal weakness.   Psychiatric/Behavioral:  Negative for altered mental status and memory loss.        Objective    Vital Signs  Visit Vitals  /99 (BP Location: Right arm, Patient Position: Sitting)   Pulse 74   Temp 98.7 °F (37.1 °C) (Oral)   Resp 16   Ht 185.4 cm (73\")   Wt 123 kg (272 lb)   SpO2 95%   BMI 35.89 kg/m²     Oxygen Therapy  SpO2: 95 %  Pulse Oximetry Type: Continuous  Device (Oxygen Therapy): room air  Flow (L/min): 2  Flowsheet Rows      Flowsheet Row First Filed Value   Admission Height 185.4 cm (73\") Documented at 08/30/2024 1328   Admission Weight 123 kg (272 lb) Documented at 08/30/2024 1328          Intake & Output (last 3 days)         08/28 0701  08/29 0700 08/29 0701  08/30 0700 08/30 0701  08/31 0700 08/31 0701  09/01 0700    P.O.   480     I.V. (mL/kg)   190 (1.5)     Total Intake(mL/kg)   670 (5.4)     Urine (mL/kg/hr)   625     Emesis/NG output   0     Stool   0     Total Output   625     Net   +45             Urine Unmeasured Occurrence   1 x     Stool Unmeasured Occurrence   0 x     Emesis Unmeasured Occurrence   1 x           Lines, Drains & Airways       Active LDAs       Name Placement date Placement time Site Days    Peripheral IV 08/30/24 1330 Left Antecubital 08/30/24  1330  Antecubital  less than 1                    Physical Exam:  Physical Exam    Results Review:     I reviewed the patient's new clinical results.    Lab Results (last 24 hours)       Procedure Component " Value Units Date/Time    POC Activated Clotting Time [[id number]]  (Abnormal) Collected: 08/30/24 1538    Specimen: Arterial Blood Updated: 09/01/24 0015     Activated Clotting Time  226 Seconds      Comment: Serial Number: [id number]:  [id number]       POC Activated Clotting Time [[id number]]  (Abnormal) Collected: 08/30/24 1503    Specimen: Arterial Blood Updated: 09/01/24 0015     Activated Clotting Time  177 Seconds      Comment: Serial Number: [id number]:  [id number]       POC Glucose Once [[id number]]  (Abnormal) Collected: 08/31/24 1932    Specimen: Blood Updated: 08/31/24 1934     Glucose 144 mg/dL      Comment: Serial Number: [id number]:  [id number]             No results found for this or any previous visit.        Medication Review:   I have reviewed the patient's current medication list  Scheduled Meds:aspirin, 81 mg, Oral, Daily  atorvastatin, 40 mg, Oral, Nightly  metoprolol tartrate, 25 mg, Oral, Q12H  ticagrelor, 90 mg, Oral, BID      Continuous Infusions:     PRN Meds:.  acetaminophen    aluminum-magnesium hydroxide-simethicone    atropine    nitroglycerin    ondansetron ODT **OR** ondansetron    [COMPLETED] Insert Peripheral IV **AND** sodium chloride    sodium chloride    ECG/EMG Results (last 24 hours)       Procedure Component Value Units Date/Time    ECG 12 Lead Tachycardia [[id number]] Collected: 08/30/24 1406     Updated: 08/30/24 1516     QT Interval 380 ms      QTC Interval 496 ms     Narrative:      HEART ZZGJ=806  bpm  RR Xmhzlwnj=469  ms  WA Interval=  ms  P Horizontal Axis=  deg  P Front Axis=  deg  QRSD Ojohctdq=952  ms  QT Vpqwlrqg=237  ms  BQeM=325  ms  QRS Axis=5  deg  T Wave Axis=-33  deg  - ABNORMAL ECG -  Atrial fibrillation  Inferior  infarct, age indeterminate  Electronically Signed By: Manuel Church (MIGUELINA) 2024-08-30 15:16:31  Date and Time of Study:2024-08-30 14:06:51    Telemetry Scan [[id number]] Resulted: 08/30/24     Updated: 08/30/24 1703    Telemetry Scan [[id number]]  Resulted: 08/30/24     Updated: 08/30/24 1720    ECG 12 Lead Rhythm Change [[id number]] Collected: 08/30/24 1615     Updated: 08/30/24 1817     QT Interval 366 ms      QTC Interval 504 ms     Narrative:      HEART RPGH=201  bpm  RR Fhwxrexi=826  ms  GA Interval=  ms  P Horizontal Axis=  deg  P Front Axis=  deg  QRSD Caqrqrtg=132  ms  QT Hmvezptb=550  ms  ZIxG=429  ms  QRS Axis=13  deg  T Wave Axis=-42  deg  - ABNORMAL ECG -  Atrial fibrillation  Inferior  infarct, age indeterminate  Prolonged QT interval  Electronically Signed By: Stef Hercules ([facility]) 2024-08-30 18:13:35  Date and Time of Study:2024-08-30 16:15:31    Telemetry Scan [[id number]] Resulted: 08/30/24     Updated: 08/30/24 2309    Telemetry Scan [[id number]] Resulted: 08/30/24     Updated: 08/31/24 0313    ECG 12 Lead Rhythm Change [[id number]] Collected: 08/31/24 0339     Updated: 08/31/24 0340     QT Interval 425 ms      QTC Interval 443 ms     Narrative:      HEART RATE=65  bpm  RR Zviwntwu=935  ms  GA Interval=  ms  P Horizontal Axis=  deg  P Front Axis=  deg  QRSD Interval=98  ms  QT Leaopdbs=108  ms  RDhF=929  ms  QRS Axis=-28  deg  T Wave Axis=-31  deg  - ABNORMAL ECG -  Atrial fibrillation  Inferior infarct, age indeterminate  Date and Time of Study:2024-08-31 03:39:35    ECG 12 Lead Chest Pain [[id number]] Collected: 08/30/24 1326     Updated: 08/31/24 0712     QT Interval 392 ms      QTC Interval 534 ms     Narrative:      HEART NHQT=670  bpm  RR Kczihvtv=521  ms  GA Interval=  ms  P Horizontal Axis=  deg  P Front Axis=  deg  QRSD Legmyepk=021  ms  QT Tussvraa=250  ms  EIvC=665  ms  QRS Axis=8  deg  T Wave Axis=91  deg  - ABNORMAL ECG -  Atrial fibrillation  Inferior  infarct, old  Nonspecific  T abnormalities, lateral leads  Prolonged QT interval  Electronically Signed By: Manuel Church ([facility]) 2024-08-31 07:12:28  Date and Time of Study:2024-08-30 13:26:10            Imaging Results (Last 24 Hours)       ** No results found for the last 24  hours. **          Results for orders placed during the hospital encounter of 08/30/24    Cardiac Catheterization/Vascular Study    Conclusion  Table formatting from the original result was not included.  Cardiac Catheterization with PCI Report    Scar Velásquez  [id number]  8/30/2024  @PCP@    He underwent cardiac catheterization and percutaneous coronary intervention.    Indications for the procedure include: acute coronary syndrome.  Acute myocardial infarction  Chest discomfort  External AICD shock for possible ventricular arrhythmia  Syncope  Abnormal troponin  Abnormal EKG    Procedure Details:  The risks, benefits, complications, treatment options, and expected outcomes were discussed with the patient. The patient and/or family concurred with the proposed plan, giving informed consent.    After informed consent the patient was brought to the cath lab after appropriate IV hydration was begun and oral premedication was given. He was further sedated with fentanyl. He was prepped and draped in the usual manner. Using the modified Seldinger access technique, a 6 Gibraltarian sheath was placed in the femoral artery. A left heart catheterization with coronary arteriography was performed. Findings are discussed below.    The decision was made to proceed with intervention. He received Heparin as per protocol. The details of the intervention are as follows:    For the interventional procedure we used a JR4 guide catheter with a BMW guidewire initially and then eventually switched to Prowater wire Entroease rebellion support across the lesion in the distal right coronary artery lesion is predilated with a 3.0 x 12 mm noncompliant balloon up to 8 isabella and lesion is stented with a 3.5 x 23 mm Xience drug-eluting stent that was deployed at 10 isabella with good angiographic results.  Patient tolerated procedure well with no immediate possible complication plan to obtain hemostasis by manual pressure.  Residual anticoagulation was  heparin patient received Integrilin bolus and drip at the end of the procedure.  Patient also received 180 mg of Brilinta at the end of the procedure.    After the procedure was completed, sedation was stopped and the sheaths and catheters were all removed according to established hospital protocols.    Conscious sedation:  Conscious sedation was performed according to protocol.  I supervised and directed an independent trained observer with the assistance of monitoring the patient's level of consciousness and physiologic status throughout the procedure.  Intraoperative service time was 90 minutes.    Findings:    Hemodynamics Central aortic pressure systolic 150 and diastolic 82 with a mean pressure of 90 mmHg  LV end-diastolic pressure of 20 mmHg  There was no gradient across the aortic valve on the pullback of the pigtail catheter  Left Main Large-caliber vessel angiographically normal bifurcates into left into descending and left circumflex arteries  RCA Large-caliber dominant vessel with 100% occlusion in the distal right coronary artery culprit vessel for patient acute coronary syndrome successfully treated with placement of a Xience drug-eluting stent  LAD Large-caliber vessel  LAD has mid focal area of angiographic 80% stenosis at the diagonal bifurcation  Ostium of the diagonal branch has angiographic 50% stenosis  Apical LAD has another focal area of angiographic 50% stenosis with some intramyocardial bridging  Circ Large-caliber vessel mid focal angiographic 30% stenosis otherwise rest of the left circumflex artery is angiographically normal  Marginal branch of left circumflex artery is a large-caliber vessel angiographically normal  LV Not done  Coronary dominance Right coronary artery    Interventions/Vessels Accessible PCI and stenting of the distal right coronary artery with placement of a Xience drug-eluting stent  Guides/Wires BMW and water guidewire  Devices Xience drug-eluting stent 3.5 x 23  mm  Post % Stenosis  0  Pre-procedure CARL flow  0  Post procedure CARL flow  3  Closure Device None  Complications None    Estimated Blood Loss:  Minimal    Specimens: None    Complications:  None; patient tolerated the procedure well.    Disposition: PACU - hemodynamically stable.    Condition: stable    Impressions:  Severe two-vessel coronary artery disease  100% occlusion of the mid right coronary artery culprit vessel for patient acute coronary syndrome and possible arrhythmia and syncope 6 will treat with placement of Xience drug-eluting stent  Significant stenosis involving the mid LAD plan to manage conservatively medical therapy at this time  Echocardiogram to assess LV systolic function    Recommendations:  Aspirin 81 mg p.o. once a day  Brilinta 90 mg p.o. twice daily  Continue Integrilin for 1 bottle  Continued aggressive risk factor modification  Observe patient in CVU bed overnight  Test results reviewed and discussed with patient and family     No results found for this or any previous visit.     Lab Results   Component Value Date    GLUCOSE 98 08/31/2024    BUN 12 08/31/2024    CREATININE 1.04 08/31/2024    EGFR 90.8 08/31/2024    BCR 11.5 08/31/2024    K 3.9 08/31/2024    CO2 28.2 08/31/2024    CALCIUM 9.4 08/31/2024    ALBUMIN 4.4 08/30/2024    BILITOT 0.3 08/30/2024     (H) 08/30/2024    ALT 77 (H) 08/30/2024      Lab Results   Component Value Date    CHOL 236 (H) 08/31/2024    TRIG 156 (H) 08/31/2024    HDL 32 (L) 08/31/2024     (H) 08/31/2024      Lab Results   Component Value Date    TROPONINT 140 (C) 08/30/2024        Assessment & Plan       Acute coronary syndrome    Elevated blood pressure reading    Attention deficit hyperactivity disorder (ADHD), predominantly inattentive type        Stef Hercules MD  09/01/24  09:12 EDT                 Electronically signed by Stef Hercules MD at 09/01/24 0915       Dre Church DO at 08/31/24 1000         "    Critical Care Progress Note     Scar Velásquez : 1980 MRN:[id number] LOS:1  Rm: 3108/1     Principal Problem: Acute coronary syndrome     Reason for follow up: All the medical problems listed below    Summary     44 y.o. male with PMH of ADHD, HTN, presented to the hospital after he collapsed at work. The patient received bystander CPR and was placed on AED which delivered a shock. The patient was brought to [facility] ER and taken to cathlab. The patient received a stent x 1 to the RCA.     Patient states that overnight he was experiencing what felt like indigestion. Symptoms will be worse with laying down so he slept in a recliner overnight.He stated that at one point he woke up and was soaking wet with from sweat. He reported that he did take a full strength bayor aspirin during the night. And this morning while at work he took some tums. Patient stated he was able to work but towards the end of his shift he remembers feeling lightheaded and things becoming \"fuzzy.\" The next thing he remembered was waking up and to EMS being there. Patient denies any known cardiac history. Patient denies any previous episodes of symptoms. He reports that he vapes daily. He drinks rarely, and does not use smokeless tobacco or smoke.      Patient was admitted to CVU following intervention for further monitoring overnight.      Significant Events     24 : Patient afebrile, vital signs are within normal limits.  Adequate urine output.  Chemistry panel completely unremarkable.  Lipid panel noted for a very low HDL at 32, total cholesterol elevated 236, triglycerides elevated at 166.  White blood cell count slightly elevated at 15,000 which is likely secondary to stress response from the acute cardiac event.  Patient is doing well and awaiting echocardiogram today.  No indication for ICU level care and will therefore downgrade to PCU.  Recommend OP PSG to eval for NORMA.    Assessment / Plan     Acute coronary syndrome  Shocked " "by external AED  S/p emergent cath --> Two-vessel CAD, 100% occlusion of the mid right coronary artery culprit vessel, stent x 1, significant stenosis involving the mid LAD plan to mange conservatively with medical therapy  Echo pending  Continue aspirin, brilinta  Integrilin per protocol post cath  Aggressive risk modifications  Observed in CVU overnight     New onset a-fib  Cardiology following     Chronic:  ADHD --- continue home vyvanse when medically appropriate      Other:  Recommend referral to outpt sleep medicine for PSG prior to d/c from this admission.      Disposition:  PCU    Code status:   Level Of Support Discussed With: Patient  Code Status (Patient has no pulse and is not breathing): CPR (Attempt to Resuscitate)  Medical Interventions (Patient has pulse or is breathing): Full Support       Nutrition:   Diet: Cardiac; Healthy Heart (2-3 Na+); Fluid Consistency: Thin (IDDSI 0)   Patient isn't on Tube Feeding     VTE Prophylaxis:  No VTE prophylaxis order currently exists.         Subjective / Review of systems     Review of Systems   Patient states that he feels \"right as rain\".  Denies shortness of breath or chest pain.  Denies fevers, chills, sweats, nausea, vomiting.  Has taken p.o. and did fine with that.  Objective / Physical Exam     Vital signs:  Temp: 98.2 °F (36.8 °C)  BP: 96/66  Heart Rate: 87  Resp: 21  SpO2: 99 %  Weight: 124 kg (272 lb 7.8 oz)    Admission Weight: Weight: 123 kg (272 lb)  Current Weight: Weight: 124 kg (272 lb 7.8 oz)    Input/Output in last 24 hours:    Intake/Output Summary (Last 24 hours) at 8/31/2024 1001  Last data filed at 8/31/2024 0600  Gross per 24 hour   Intake 670 ml   Output 625 ml   Net 45 ml      Net IO Since Admission: 45 mL [08/31/24 1001]     Physical Exam     GEN:  Pleasant, young male, slightly obese.  Appears appropriate for stated age.  WD/WN/WH.  Sitting up in bed on NC.  NAD.  NEURO:  Brainstem reflexes intact.  No obvious focal deficit.  Moves all " 4 ext.  HEENT:  N/AT.  PERRL.  MMM.  Oropharynx non-erythematous.  No drainage from the eyes/ears/nose.  No conjunctival petechiae.  No oral thrush.  Auditory and visual acuity grossly wnl.  Good dentition.  Voice normal.  NECK:  Supple, NT, trachea midline.  No meningismus.  No ROM limitation.  No torticollis.  No JVD.  No thyromegaly.    CHEST/LUNGS:  Breath sounds are clear and equal bilaterally.  No w/r/r.  Chest excursion equal bilaterally.    CARDIOVASCULAR:  RRR w/o murmur noted.  PMI not displaced.  GI:  Abdomen soft, NT, ND, +BS.  No HSM.  :  Deferred.  EXTREMITIES:  No deformity or amputation.  No cyanosis, edema, or asymmetry.  Pulses 2+ and equal in BLE's.    SKIN:  Warm, dry, and pink.  No rash, breakdown, or track marks noted.  LYMPHATICS/HEME:  No overt LAD or abnormal bruising.  No lymphedema.  MSK:  Normal ROM.  No joint abnormalities noted.  Strength is 5/5 and equal in BUE and BLE's.  PSYCH:  Pleasant.  A&Ox 3.  Normal mood and affect.  Responds appropriately to commands and appears to comprehend instructions.            Radiology and Labs     Results from last 7 days   Lab Units 08/31/24  0343 08/30/24  1329   WBC 10*3/mm3 15.13* 16.46*   HEMOGLOBIN g/dL 14.2 14.3   HEMATOCRIT % 42.6 42.1   PLATELETS 10*3/mm3 250 257      Results from last 7 days   Lab Units 08/31/24  0343 08/30/24  1329   PROTIME Seconds  --  10.7   INR   --  0.98   APTT seconds 28.2* 25.0*      Results from last 7 days   Lab Units 08/31/24  0343 08/30/24  1329   SODIUM mmol/L 140 142   POTASSIUM mmol/L 3.9 3.6   CHLORIDE mmol/L 104 106   CO2 mmol/L 28.2 23.8   BUN mg/dL 12 17   CREATININE mg/dL 1.04 1.25   GLUCOSE mg/dL 98 135*   MAGNESIUM mg/dL  --  2.1      Results from last 7 days   Lab Units 08/30/24  1329   ALK PHOS U/L 71   AST (SGOT) U/L 104*   ALT (SGPT) U/L 77*           No radiology results for the last day    Current medications     Scheduled Meds:   aspirin, 81 mg, Oral, Daily  atorvastatin, 40 mg, Oral,  Nightly  carvedilol, 3.125 mg, Oral, BID With Meals  ticagrelor, 90 mg, Oral, BID        Continuous Infusions:          Plan discussed with RN. Reviewed all other data in the last 24 hours, including but not limited to vitals, labs, microbiology, imaging and pertinent notes from other providers.  Plan also discussed with pt and his wife at the bedside.      Dre Church DO   Critical Care  08/31/24   10:01 EDT       Electronically signed by Dre Church DO at 08/31/24 1014       Stef Hercules MD at 08/31/24 0904          Cardiology [facility]      Patient Care Team:  Gaetano Zhu PA-C as PCP - General (Physician Assistant)            Cardiology assessment and plan        Acute coronary syndrome  Shocked by external AED  Abnormal EKG  Intermittent heartburn for 2 weeks with some symptoms last night and this morning  Abnormal EKG with inferior Q waves and nonspecific ST changes  Syncope  Newly diagnosed atrial fibrillation  Abnormal troponin  Atrial fibrillation with controlled ventricular response  Hyperlipidemia  Severe two-vessel coronary artery disease  100% occlusion of the mid right coronary artery culprit vessel for patient acute coronary syndrome and possible arrhythmia and syncope 6 will treat with placement of Xience drug-eluting stent  Significant stenosis involving the mid LAD plan to manage conservatively medical therapy at this time  Echocardiogram to assess LV systolic function  Aspirin 81 mg p.o. once a day  Brilinta 90 mg p.o. twice daily  Continue Integrilin for 1 bottle  Continued aggressive risk factor modification  High-dose statin  Likely will benefit from long-term oral anticoagulation for atrial fibrillation for stroke prophylaxis  Maykel vascular score of 2  Echocardiogram to assess LV systolic function  Diagnosis and treatment options reviewed and discussed with patient  Tmax is 98.2 pulse is 86 respirations are 20 blood pressure is 96/66 to 140/90 sats are  "99%  Groin site looks good except for a small bruise  Sodium is 140 potassium is 3.9 creatinine is 1.0 hemoglobin is 14.2  Echocardiogram to assess LV systolic function  Current medications include aspirin 81 mg p.o. once a day Lipitor 40 mg p.o. once a day  Brilinta 90 mg p.o. twice daily  Start patient on low-dose beta-blocker if the blood pressure tolerates  Likely consider starting patient on anticoagulation therapy either tonight or tomorrow morning before discharge  Further recommendation based on patient course        Chief Complaint: Chest discomfort syncope and AED shock    Subjective no new complaints    Interval History: No significant change in the overall    History taken from: patient    Review of Systems:  ROS    Objective    Vital Signs  Visit Vitals  BP 96/66 (BP Location: Right arm, Patient Position: Lying)   Pulse 87   Temp 98.2 °F (36.8 °C) (Oral)   Resp 21   Ht 185.4 cm (73\")   Wt 124 kg (272 lb 7.8 oz)   SpO2 99%   BMI 35.95 kg/m²     Oxygen Therapy  SpO2: 99 %  Pulse Oximetry Type: Continuous  Device (Oxygen Therapy): room air  Flow (L/min): 2  Flowsheet Rows      Flowsheet Row First Filed Value   Admission Height 185.4 cm (73\") Documented at 08/30/2024 1328   Admission Weight 123 kg (272 lb) Documented at 08/30/2024 1328          Intake & Output (last 3 days)         08/28 0701  08/29 0700 08/29 0701  08/30 0700 08/30 0701  08/31 0700 08/31 0701  09/01 0700    P.O.   480     I.V. (mL/kg)   190 (1.5)     Total Intake(mL/kg)   670 (5.4)     Urine (mL/kg/hr)   625     Emesis/NG output   0     Stool   0     Total Output   625     Net   +45             Urine Unmeasured Occurrence   1 x     Stool Unmeasured Occurrence   0 x     Emesis Unmeasured Occurrence   1 x           Lines, Drains & Airways       Active LDAs       Name Placement date Placement time Site Days    Peripheral IV 08/30/24 1330 Left Antecubital 08/30/24  1330  Antecubital  less than 1                    Physical Exam:  Physical " Exam    Results Review:     I reviewed the patient's new clinical results.    Lab Results (last 24 hours)       Procedure Component Value Units Date/Time    Basic Metabolic Panel [[id number]]  (Normal) Collected: 08/31/24 0343    Specimen: Blood Updated: 08/31/24 0416     Glucose 98 mg/dL      BUN 12 mg/dL      Creatinine 1.04 mg/dL      Sodium 140 mmol/L      Potassium 3.9 mmol/L      Chloride 104 mmol/L      CO2 28.2 mmol/L      Calcium 9.4 mg/dL      BUN/Creatinine Ratio 11.5     Anion Gap 7.8 mmol/L      eGFR 90.8 mL/min/1.73     Narrative:      GFR Normal >60  Chronic Kidney Disease <60  Kidney Failure <15      Lipid Panel [[id number]]  (Abnormal) Collected: 08/31/24 0343    Specimen: Blood Updated: 08/31/24 0416     Total Cholesterol 236 mg/dL      Triglycerides 156 mg/dL      HDL Cholesterol 32 mg/dL      LDL Cholesterol  175 mg/dL      VLDL Cholesterol 29 mg/dL      LDL/HDL Ratio 5.40    Narrative:      Cholesterol Reference Ranges  (U.S. Department of Health and Human Services ATP III Classifications)    Desirable          <200 mg/dL  Borderline High    200-239 mg/dL  High Risk          >240 mg/dL      Triglyceride Reference Ranges  (U.S. Department of Health and Human Services ATP III Classifications)    Normal           <150 mg/dL  Borderline High  150-199 mg/dL  High             200-499 mg/dL  Very High        >500 mg/dL    HDL Reference Ranges  (U.S. Department of Health and Human Services ATP III Classifications)    Low     <40 mg/dl (major risk factor for CHD)  High    >60 mg/dl ('negative' risk factor for CHD)        LDL Reference Ranges  (U.S. Department of Health and Human Services ATP III Classifications)    Optimal          <100 mg/dL  Near Optimal     100-129 mg/dL  Borderline High  130-159 mg/dL  High             160-189 mg/dL  Very High        >189 mg/dL    aPTT [[id number]]  (Abnormal) Collected: 08/31/24 0343    Specimen: Blood Updated: 08/31/24 0413     PTT 28.2 seconds     CBC (No Diff)  [[id number]]  (Abnormal) Collected: 08/31/24 0343    Specimen: Blood Updated: 08/31/24 0357     WBC 15.13 10*3/mm3      RBC 4.84 10*6/mm3      Hemoglobin 14.2 g/dL      Hematocrit 42.6 %      MCV 88.0 fL      MCH 29.3 pg      MCHC 33.3 g/dL      RDW 12.5 %      RDW-SD 40.7 fl      MPV 10.9 fL      Platelets 250 10*3/mm3     POC Activated Clotting Time [[id number]]  (Abnormal) Collected: 08/30/24 1933    Specimen: Arterial Blood Updated: 08/30/24 2004     Activated Clotting Time  152 Seconds      Comment: Serial Number: [id number]:  [id number]       POC Activated Clotting Time [[id number]]  (Abnormal) Collected: 08/30/24 1851    Specimen: Arterial Blood Updated: 08/30/24 1857     Activated Clotting Time  171 Seconds      Comment: Serial Number: [id number]:  [id number]       Protime-INR [[id number]]  (Normal) Collected: 08/30/24 1329    Specimen: Blood Updated: 08/30/24 1445     Protime 10.7 Seconds      INR 0.98    aPTT [[id number]]  (Abnormal) Collected: 08/30/24 1329    Specimen: Blood Updated: 08/30/24 1445     PTT 25.0 seconds     High Sensitivity Troponin T [[id number]]  (Abnormal) Collected: 08/30/24 1329    Specimen: Blood Updated: 08/30/24 1407     HS Troponin T 140 ng/L     Narrative:      High Sensitive Troponin T Reference Range:  <14.0 ng/L- Negative Female for AMI  <22.0 ng/L- Negative Male for AMI  >=14 - Abnormal Female indicating possible myocardial injury.  >=22 - Abnormal Male indicating possible myocardial injury.   Clinicians would have to utilize clinical acumen, EKG, Troponin, and serial changes to determine if it is an Acute Myocardial Infarction or myocardial injury due to an underlying chronic condition.         D-dimer, Quantitative [[id number]]  (Abnormal) Collected: 08/30/24 1329    Specimen: Blood Updated: 08/30/24 1404     D-Dimer, Quantitative 0.93 mg/L (FEU)     Narrative:      According to the assay 's published package insert, a normal (<0.50 mg/L (FEU)) D-dimer result in  "conjunction with a non-high clinical probability assessment, excludes deep vein thrombosis (DVT) and pulmonary embolism (PE) with high sensitivity.    D-dimer values increase with age and this can make VTE exclusion of an older population difficult. To address this, the [employer], based on best available evidence and recent guidelines, recommends that clinicians use age-adjusted D-dimer thresholds in patients greater than 50 years of age with: a) a low probability of PE who do not meet all Pulmonary Embolism Rule Out Criteria, or b) in those with intermediate probability of PE.   The formula for an age-adjusted D-dimer cut-off is \"age/100\".  For example, a 60 year old patient would have an age-adjusted cut-off of 0.60 mg/L (FEU) and an 80 year old 0.80 mg/L (FEU).    Magnesium [[id number]]  (Normal) Collected: 08/30/24 1329    Specimen: Blood Updated: 08/30/24 1404     Magnesium 2.1 mg/dL     Comprehensive Metabolic Panel [[id number]]  (Abnormal) Collected: 08/30/24 1329    Specimen: Blood Updated: 08/30/24 1404     Glucose 135 mg/dL      BUN 17 mg/dL      Creatinine 1.25 mg/dL      Sodium 142 mmol/L      Potassium 3.6 mmol/L      Chloride 106 mmol/L      CO2 23.8 mmol/L      Calcium 10.4 mg/dL      Total Protein 6.8 g/dL      Albumin 4.4 g/dL      ALT (SGPT) 77 U/L      AST (SGOT) 104 U/L      Alkaline Phosphatase 71 U/L      Total Bilirubin 0.3 mg/dL      Globulin 2.4 gm/dL      A/G Ratio 1.8 g/dL      BUN/Creatinine Ratio 13.6     Anion Gap 12.2 mmol/L      eGFR 72.8 mL/min/1.73     Narrative:      GFR Normal >60  Chronic Kidney Disease <60  Kidney Failure <15      Schlater Draw [[id number]] Collected: 08/30/24 1329    Specimen: Blood Updated: 08/30/24 1346    Narrative:      The following orders were created for panel order Schlater Draw.  Procedure                               Abnormality         Status                     ---------                               -----------         ------       "               Green Top (Gel)[[id number]]                                  Final result               Lavender Top[[id number]]                                     Final result               Gold Top - SST[[id number]]                                   Final result               Light Blue Top[[id number]]                                   Final result                 Please view results for these tests on the individual orders.    Green Top (Gel) [[id number]] Collected: 08/30/24 1329    Specimen: Blood Updated: 08/30/24 1346     Extra Tube Hold for add-ons.     Comment: Auto resulted.       Lavender Top [[id number]] Collected: 08/30/24 1329    Specimen: Blood Updated: 08/30/24 1346     Extra Tube hold for add-on     Comment: Auto resulted       Gold Top - SST [[id number]] Collected: 08/30/24 1329    Specimen: Blood Updated: 08/30/24 1346     Extra Tube Hold for add-ons.     Comment: Auto resulted.       Light Blue Top [[id number]] Collected: 08/30/24 1329    Specimen: Blood Updated: 08/30/24 1346     Extra Tube Hold for add-ons.     Comment: Auto resulted       CBC & Differential [[id number]]  (Abnormal) Collected: 08/30/24 1329    Specimen: Blood Updated: 08/30/24 1339    Narrative:      The following orders were created for panel order CBC & Differential.  Procedure                               Abnormality         Status                     ---------                               -----------         ------                     CBC Auto Differential[[id number]]        Abnormal            Final result                 Please view results for these tests on the individual orders.    CBC Auto Differential [[id number]]  (Abnormal) Collected: 08/30/24 1329    Specimen: Blood Updated: 08/30/24 1339     WBC 16.46 10*3/mm3      RBC 4.91 10*6/mm3      Hemoglobin 14.3 g/dL      Hematocrit 42.1 %      MCV 85.7 fL      MCH 29.1 pg      MCHC 34.0 g/dL      RDW 12.5 %      RDW-SD 39.0 fl      MPV 10.6 fL      Platelets 257 10*3/mm3       Neutrophil % 83.0 %      Lymphocyte % 9.8 %      Monocyte % 4.7 %      Eosinophil % 1.5 %      Basophil % 0.5 %      Immature Grans % 0.5 %      Neutrophils, Absolute 13.67 10*3/mm3      Lymphocytes, Absolute 1.61 10*3/mm3      Monocytes, Absolute 0.77 10*3/mm3      Eosinophils, Absolute 0.24 10*3/mm3      Basophils, Absolute 0.09 10*3/mm3      Immature Grans, Absolute 0.08 10*3/mm3      nRBC 0.0 /100 WBC           No results found for this or any previous visit.        Medication Review:   I have reviewed the patient's current medication list  Scheduled Meds:aspirin, 81 mg, Oral, Daily  atorvastatin, 40 mg, Oral, Nightly  dilTIAZem, 10 mg, Intravenous, Once  ticagrelor, 90 mg, Oral, BID      Continuous Infusions:dilTIAZem, 5-15 mg/hr      PRN Meds:.  acetaminophen    aluminum-magnesium hydroxide-simethicone    atropine    nitroglycerin    ondansetron ODT **OR** ondansetron    [COMPLETED] Insert Peripheral IV **AND** sodium chloride    sodium chloride    ECG/EMG Results (last 24 hours)       Procedure Component Value Units Date/Time    ECG 12 Lead Tachycardia [[id number]] Collected: 08/30/24 1406     Updated: 08/30/24 1516     QT Interval 380 ms      QTC Interval 496 ms     Narrative:      HEART ZVLM=004  bpm  RR Fomncnnz=805  ms  ND Interval=  ms  P Horizontal Axis=  deg  P Front Axis=  deg  QRSD Crkucpag=908  ms  QT Czjpesor=080  ms  MRlP=838  ms  QRS Axis=5  deg  T Wave Axis=-33  deg  - ABNORMAL ECG -  Atrial fibrillation  Inferior  infarct, age indeterminate  Electronically Signed By: Manuel Church (MIGUELINA) 2024-08-30 15:16:31  Date and Time of Study:2024-08-30 14:06:51    Telemetry Scan [[id number]] Resulted: 08/30/24     Updated: 08/30/24 1703    Telemetry Scan [[id number]] Resulted: 08/30/24     Updated: 08/30/24 1720    ECG 12 Lead Rhythm Change [[id number]] Collected: 08/30/24 1615     Updated: 08/30/24 1817     QT Interval 366 ms      QTC Interval 504 ms     Narrative:      HEART LSHE=325  bpm  RR Lftolkde=659   ms  PA Interval=  ms  P Horizontal Axis=  deg  P Front Axis=  deg  QRSD Kzjqjoan=684  ms  QT Ukzurlzk=964  ms  PSgF=159  ms  QRS Axis=13  deg  T Wave Axis=-42  deg  - ABNORMAL ECG -  Atrial fibrillation  Inferior  infarct, age indeterminate  Prolonged QT interval  Electronically Signed By: Stef Hercules ([facility]) 2024-08-30 18:13:35  Date and Time of Study:2024-08-30 16:15:31    Telemetry Scan [[id number]] Resulted: 08/30/24     Updated: 08/30/24 2309    Telemetry Scan [[id number]] Resulted: 08/30/24     Updated: 08/31/24 0313    ECG 12 Lead Rhythm Change [[id number]] Collected: 08/31/24 0339     Updated: 08/31/24 0340     QT Interval 425 ms      QTC Interval 443 ms     Narrative:      HEART RATE=65  bpm  RR Qdvrefkv=623  ms  PA Interval=  ms  P Horizontal Axis=  deg  P Front Axis=  deg  QRSD Interval=98  ms  QT Zqxynzdh=848  ms  RIxN=122  ms  QRS Axis=-28  deg  T Wave Axis=-31  deg  - ABNORMAL ECG -  Atrial fibrillation  Inferior infarct, age indeterminate  Date and Time of Study:2024-08-31 03:39:35    ECG 12 Lead Chest Pain [[id number]] Collected: 08/30/24 1326     Updated: 08/31/24 0712     QT Interval 392 ms      QTC Interval 534 ms     Narrative:      HEART YECA=271  bpm  RR Svdsnmhd=374  ms  PA Interval=  ms  P Horizontal Axis=  deg  P Front Axis=  deg  QRSD Xggcfsau=935  ms  QT Wrcobcvz=425  ms  YDoJ=861  ms  QRS Axis=8  deg  T Wave Axis=91  deg  - ABNORMAL ECG -  Atrial fibrillation  Inferior  infarct, old  Nonspecific  T abnormalities, lateral leads  Prolonged QT interval  Electronically Signed By: Manuel Church ([facility]) 2024-08-31 07:12:28  Date and Time of Study:2024-08-30 13:26:10            Imaging Results (Last 24 Hours)       ** No results found for the last 24 hours. **          Results for orders placed during the hospital encounter of 08/30/24    Cardiac Catheterization/Vascular Study    Conclusion  Table formatting from the original result was not included.  Cardiac Catheterization with PCI  Report    Scar PACHECO [place]  [id number]  8/30/2024  @PCP@    He underwent cardiac catheterization and percutaneous coronary intervention.    Indications for the procedure include: acute coronary syndrome.  Acute myocardial infarction  Chest discomfort  External AICD shock for possible ventricular arrhythmia  Syncope  Abnormal troponin  Abnormal EKG    Procedure Details:  The risks, benefits, complications, treatment options, and expected outcomes were discussed with the patient. The patient and/or family concurred with the proposed plan, giving informed consent.    After informed consent the patient was brought to the cath lab after appropriate IV hydration was begun and oral premedication was given. He was further sedated with fentanyl. He was prepped and draped in the usual manner. Using the modified Seldinger access technique, a 6 Urdu sheath was placed in the femoral artery. A left heart catheterization with coronary arteriography was performed. Findings are discussed below.    The decision was made to proceed with intervention. He received Heparin as per protocol. The details of the intervention are as follows:    For the interventional procedure we used a JR4 guide catheter with a GetBulbW guidewire initially and then eventually switched to ICONIC wire Entroease rebellion support across the lesion in the distal right coronary artery lesion is predilated with a 3.0 x 12 mm noncompliant balloon up to 8 isabella and lesion is stented with a 3.5 x 23 mm Xience drug-eluting stent that was deployed at 10 isabella with good angiographic results.  Patient tolerated procedure well with no immediate possible complication plan to obtain hemostasis by manual pressure.  Residual anticoagulation was heparin patient received Integrilin bolus and drip at the end of the procedure.  Patient also received 180 mg of Brilinta at the end of the procedure.    After the procedure was completed, sedation was stopped and the sheaths and catheters  were all removed according to established hospital protocols.    Conscious sedation:  Conscious sedation was performed according to protocol.  I supervised and directed an independent trained observer with the assistance of monitoring the patient's level of consciousness and physiologic status throughout the procedure.  Intraoperative service time was 90 minutes.    Findings:    Hemodynamics Central aortic pressure systolic 150 and diastolic 82 with a mean pressure of 90 mmHg  LV end-diastolic pressure of 20 mmHg  There was no gradient across the aortic valve on the pullback of the pigtail catheter  Left Main Large-caliber vessel angiographically normal bifurcates into left into descending and left circumflex arteries  RCA Large-caliber dominant vessel with 100% occlusion in the distal right coronary artery culprit vessel for patient acute coronary syndrome successfully treated with placement of a Xience drug-eluting stent  LAD Large-caliber vessel  LAD has mid focal area of angiographic 80% stenosis at the diagonal bifurcation  Ostium of the diagonal branch has angiographic 50% stenosis  Apical LAD has another focal area of angiographic 50% stenosis with some intramyocardial bridging  Circ Large-caliber vessel mid focal angiographic 30% stenosis otherwise rest of the left circumflex artery is angiographically normal  Marginal branch of left circumflex artery is a large-caliber vessel angiographically normal  LV Not done  Coronary dominance Right coronary artery    Interventions/Vessels Accessible PCI and stenting of the distal right coronary artery with placement of a Xience drug-eluting stent  Guides/Wires BMW and water guidewire  Devices Xience drug-eluting stent 3.5 x 23 mm  Post % Stenosis  0  Pre-procedure CARL flow  0  Post procedure CARL flow  3  Closure Device None  Complications None    Estimated Blood Loss:  Minimal    Specimens: None    Complications:  None; patient tolerated the procedure  well.    Disposition: PACU - hemodynamically stable.    Condition: stable    Impressions:  Severe two-vessel coronary artery disease  100% occlusion of the mid right coronary artery culprit vessel for patient acute coronary syndrome and possible arrhythmia and syncope 6 will treat with placement of Xience drug-eluting stent  Significant stenosis involving the mid LAD plan to manage conservatively medical therapy at this time  Echocardiogram to assess LV systolic function    Recommendations:  Aspirin 81 mg p.o. once a day  Brilinta 90 mg p.o. twice daily  Continue Integrilin for 1 bottle  Continued aggressive risk factor modification  Observe patient in CVU bed overnight  Test results reviewed and discussed with patient and family     No results found for this or any previous visit.     Lab Results   Component Value Date    GLUCOSE 98 08/31/2024    BUN 12 08/31/2024    CREATININE 1.04 08/31/2024    EGFR 90.8 08/31/2024    BCR 11.5 08/31/2024    K 3.9 08/31/2024    CO2 28.2 08/31/2024    CALCIUM 9.4 08/31/2024    ALBUMIN 4.4 08/30/2024    BILITOT 0.3 08/30/2024     (H) 08/30/2024    ALT 77 (H) 08/30/2024      Lab Results   Component Value Date    CHOL 236 (H) 08/31/2024    TRIG 156 (H) 08/31/2024    HDL 32 (L) 08/31/2024     (H) 08/31/2024      Lab Results   Component Value Date    TROPONINT 140 (C) 08/30/2024        Assessment & Plan       Acute coronary syndrome    Elevated blood pressure reading    Attention deficit hyperactivity disorder (ADHD), predominantly inattentive type        Stef Hercules MD  08/31/24  09:04 EDT                 Electronically signed by Stef Hercules MD at 08/31/24 0908          Consult Notes (last 5 days)        Melissa Navarrete APRN at 08/31/24 1150        Consult Orders    1. Inpatient Hospitalist Consult [[id number]] ordered by Becky Zeng APRN at 08/31/24 0928              Attestation signed by Rosalba Rodriguez MD at 08/31/24 1243    I have reviewed this  "documentation and agree.                      [facility] MEDICINE SERVICE  TRANSFER OF CARE/ACCEPTANCE NOTE    PATIENT NAME: Scar Velásquez  : 1980  MRN: [id number]     Active Hospital Problems    Diagnosis  POA    **Acute coronary syndrome [I24.9]  Yes    Attention deficit hyperactivity disorder (ADHD), predominantly inattentive type [F90.0]  Yes    Elevated blood pressure reading [R03.0]  Yes      Resolved Hospital Problems    Diagnosis Date Resolved POA    STEMI (ST elevation myocardial infarction) [I21.3] 2024 Yes     This is not a billable encounter, please see Intensivist's note.     Patient seen and examined by me on 24 at 1150.    Interim History: Patient was admitted yesterday, and HPI is as follows; \" 44 y.o. male with PMH of ADHD, HTN, presented to the hospital after he collapsed at work. The patient received bystander CPR and was placed on AED which delivered a shock. The patient was brought to [facility] ER and taken to cathlab. The patient received a stent x 1 to the RCA.     Patient states that overnight he was experiencing what felt like indigestion. Symptoms will be worse with laying down so he slept in a recliner overnight.He stated that at one point he woke up and was soaking wet with from sweat. He reported that he did take a full strength bayor aspirin during the night. And this morning while at work he took some tums. Patient stated he was able to work but towards the end of his shift he remembers feeling lightheaded and things becoming \"fuzzy.\" The next thing he remembered was waking up and to EMS being there. Patient denies any known cardiac history. Patient denies any previous episodes of symptoms. He reports that he vapes daily. He drinks rarely, and does not use smokeless tobacco or smoke.   Patient was admitted to CVU following intervention for further monitoring overnight.  \"    I have noted the following changes since admission: Patient is being actively treated for ACS, " he is S/P one stent placed to the RCA. He also has new onset atrial fibrillation. ECHO ordered and pending. Patient to be downgraded from ICU level care to PCU today.     I have reviewed the H&P, diagnostic data and plan of care for Scar Velásquez.  The Hospitalist will be taking over care of this patient during the current hospitalization.        Signature: Electronically signed by HEAVENLY Green, 08/31/24, 11:50 EDT.  [facility] Hospitalist Team             Electronically signed by Rosalba Rodriguez MD at 08/31/24 8875       Stef Hercules MD at 08/30/24 4988            Referring Provider: Dr. Manuel Church  Reason for Consultation: External AED shock and abnormal EKG    Chief complaint syncope    Cardiology assessment and plan      Acute coronary syndrome  Shocked by external AED  Abnormal EKG  Intermittent heartburn for 2 weeks with some symptoms last night and this morning  Abnormal EKG with inferior Q waves and nonspecific ST changes  Syncope  Newly diagnosed atrial fibrillation  Abnormal troponin    Plans to take patient to the Cath Lab emergently for further evaluation and treatment options  Risk benefits and alternatives reviewed and discussed with patient and family            History of present illness:  Scar Velásquez is a 44 y.o. male who presents with past medical history that is significant for history of obesity hyperlipidemia family history of coronary artery disease and abnormal coronary artery calcium score presented with an episode of syncope at work with some discomfort that he describes as heartburn and then because patient did not had any pulse AED was placed at workplace and patient received discharge from the ED device  Patient was seen in the emergency room though EKG changes have been nonspecific and patient's symptoms are somewhat nonspecific and patient looks stable plan to proceed with emergent cardiac catheterization for further evaluation and treatment options and risk  benefits and alternatives reviewed and discussed patient and family        Review of Systems  Review of Systems   Constitutional: Negative for chills, decreased appetite and malaise/fatigue.   HENT:  Negative for congestion and nosebleeds.    Eyes:  Negative for blurred vision and double vision.   Cardiovascular:  Positive for chest pain. Negative for dyspnea on exertion, irregular heartbeat, leg swelling, near-syncope, orthopnea and palpitations.   Respiratory:  Negative for cough and shortness of breath.    Hematologic/Lymphatic: Negative for adenopathy. Does not bruise/bleed easily.   Skin:  Negative for color change and rash.   Musculoskeletal:  Negative for back pain and joint pain.   Gastrointestinal:  Negative for bloating, abdominal pain, hematemesis and hematochezia.   Genitourinary:  Negative for flank pain and hematuria.   Neurological:  Negative for dizziness and focal weakness.   Psychiatric/Behavioral:  Negative for altered mental status. The patient does not have insomnia.        Past Medical History  Past Medical History:   Diagnosis Date    Biceps rupture, distal     Chondromalacia, patella     Elbow fracture, left     Essential (primary) hypertension 10/11/2017    Hearing loss     Obesity 12/21/2015    and   Past Surgical History:   Procedure Laterality Date    DISTAL BICEPS TENDON REPAIR Right 2/4/2020    Procedure: TENDON DISTAL BICEPS REPAIR;  Surgeon: Richard Berger MD;  Location: [facility] MAIN OR;  Service: Orthopedics;  Laterality: Right;    EAR TUBES      TONSILLECTOMY         Family History  Family History   Problem Relation Age of Onset    Cancer Mother         Ovarian    Cancer Father         Prostate    Coronary artery disease Father        Social History  Social History     Socioeconomic History    Marital status:    Tobacco Use    Smoking status: Former     Types: Electronic Cigarette     Quit date: 2014     Years since quitting: 10.6     Passive exposure: Past     "Smokeless tobacco: Never    Tobacco comments:     10-15 times per day   Vaping Use    Vaping status: Former    Quit date: 6/14/2019   Substance and Sexual Activity    Alcohol use: Not Currently     Comment: rare  2 times per year    Drug use: Never    Sexual activity: Yes     Partners: Female     Birth control/protection: Ring       Objective     Physical Exam:  Constitutional:       Appearance: Well-developed.   Eyes:      Conjunctiva/sclera: Conjunctivae normal.      Pupils: Pupils are equal, round, and reactive to light.   HENT:      Head: Normocephalic and atraumatic.   Neck:      Thyroid: No thyromegaly.   Pulmonary:      Effort: Pulmonary effort is normal.      Breath sounds: Normal breath sounds.   Cardiovascular:      Normal rate. Regular rhythm.   Pulses:     Intact distal pulses.   Edema:     Peripheral edema absent.   Abdominal:      General: Bowel sounds are normal.      Palpations: Abdomen is soft.   Musculoskeletal:      Cervical back: Normal range of motion and neck supple. Skin:     General: Skin is warm.   Neurological:      Mental Status: Alert and oriented to person, place, and time.         Vital Signs  Vitals:    08/30/24 1346 08/30/24 1349 08/30/24 1402 08/30/24 1404   BP: 120/85  126/81    BP Location:       Patient Position:       Pulse:  98  97   Resp:       Temp:       TempSrc:       SpO2:  98%  100%   Weight:       Height:           Weight  Flowsheet Rows      Flowsheet Row First Filed Value   Admission Height 185.4 cm (73\") Documented at 08/30/2024 1328   Admission Weight 123 kg (272 lb) Documented at 08/30/2024 1328                Results Review:  Lab Results (last 24 hours)       Procedure Component Value Units Date/Time    High Sensitivity Troponin T [[id number]]  (Abnormal) Collected: 08/30/24 1329    Specimen: Blood Updated: 08/30/24 1407     HS Troponin T 140 ng/L     Narrative:      High Sensitive Troponin T Reference Range:  <14.0 ng/L- Negative Female for AMI  <22.0 ng/L- Negative " "Male for AMI  >=14 - Abnormal Female indicating possible myocardial injury.  >=22 - Abnormal Male indicating possible myocardial injury.   Clinicians would have to utilize clinical acumen, EKG, Troponin, and serial changes to determine if it is an Acute Myocardial Infarction or myocardial injury due to an underlying chronic condition.         D-dimer, Quantitative [[id number]]  (Abnormal) Collected: 08/30/24 1329    Specimen: Blood Updated: 08/30/24 1404     D-Dimer, Quantitative 0.93 mg/L (FEU)     Narrative:      According to the assay 's published package insert, a normal (<0.50 mg/L (FEU)) D-dimer result in conjunction with a non-high clinical probability assessment, excludes deep vein thrombosis (DVT) and pulmonary embolism (PE) with high sensitivity.    D-dimer values increase with age and this can make VTE exclusion of an older population difficult. To address this, the [employer] College [employer], based on best available evidence and recent guidelines, recommends that clinicians use age-adjusted D-dimer thresholds in patients greater than 50 years of age with: a) a low probability of PE who do not meet all Pulmonary Embolism Rule Out Criteria, or b) in those with intermediate probability of PE.   The formula for an age-adjusted D-dimer cut-off is \"age/100\".  For example, a 60 year old patient would have an age-adjusted cut-off of 0.60 mg/L (FEU) and an 80 year old 0.80 mg/L (FEU).    Magnesium [[id number]]  (Normal) Collected: 08/30/24 1329    Specimen: Blood Updated: 08/30/24 1404     Magnesium 2.1 mg/dL     Comprehensive Metabolic Panel [[id number]]  (Abnormal) Collected: 08/30/24 1329    Specimen: Blood Updated: 08/30/24 1404     Glucose 135 mg/dL      BUN 17 mg/dL      Creatinine 1.25 mg/dL      Sodium 142 mmol/L      Potassium 3.6 mmol/L      Chloride 106 mmol/L      CO2 23.8 mmol/L      Calcium 10.4 mg/dL      Total Protein 6.8 g/dL      Albumin 4.4 g/dL      ALT (SGPT) 77 U/L      AST " (SGOT) 104 U/L      Alkaline Phosphatase 71 U/L      Total Bilirubin 0.3 mg/dL      Globulin 2.4 gm/dL      A/G Ratio 1.8 g/dL      BUN/Creatinine Ratio 13.6     Anion Gap 12.2 mmol/L      eGFR 72.8 mL/min/1.73     Narrative:      GFR Normal >60  Chronic Kidney Disease <60  Kidney Failure <15      [place] Draw [[id number]] Collected: 08/30/24 1329    Specimen: Blood Updated: 08/30/24 1346    Narrative:      The following orders were created for panel order [place] Draw.  Procedure                               Abnormality         Status                     ---------                               -----------         ------                     Green Top (Gel)[[id number]]                                  Final result               Lavender Top[[id number]]                                     Final result               Gold Top - SST[[id number]]                                   Final result               Light Blue Top[[id number]]                                   Final result                 Please view results for these tests on the individual orders.    Green Top (Gel) [[id number]] Collected: 08/30/24 1329    Specimen: Blood Updated: 08/30/24 1346     Extra Tube Hold for add-ons.     Comment: Auto resulted.       Lavender Top [[id number]] Collected: 08/30/24 1329    Specimen: Blood Updated: 08/30/24 1346     Extra Tube hold for add-on     Comment: Auto resulted       Gold Top - SST [[id number]] Collected: 08/30/24 1329    Specimen: Blood Updated: 08/30/24 1346     Extra Tube Hold for add-ons.     Comment: Auto resulted.       Light Blue Top [[id number]] Collected: 08/30/24 1329    Specimen: Blood Updated: 08/30/24 1346     Extra Tube Hold for add-ons.     Comment: Auto resulted       CBC & Differential [[id number]]  (Abnormal) Collected: 08/30/24 1329    Specimen: Blood Updated: 08/30/24 1339    Narrative:      The following orders were created for panel order CBC & Differential.  Procedure                                Abnormality         Status                     ---------                               -----------         ------                     CBC Auto Differential[[id number]]        Abnormal            Final result                 Please view results for these tests on the individual orders.    CBC Auto Differential [[id number]]  (Abnormal) Collected: 08/30/24 1329    Specimen: Blood Updated: 08/30/24 1339     WBC 16.46 10*3/mm3      RBC 4.91 10*6/mm3      Hemoglobin 14.3 g/dL      Hematocrit 42.1 %      MCV 85.7 fL      MCH 29.1 pg      MCHC 34.0 g/dL      RDW 12.5 %      RDW-SD 39.0 fl      MPV 10.6 fL      Platelets 257 10*3/mm3      Neutrophil % 83.0 %      Lymphocyte % 9.8 %      Monocyte % 4.7 %      Eosinophil % 1.5 %      Basophil % 0.5 %      Immature Grans % 0.5 %      Neutrophils, Absolute 13.67 10*3/mm3      Lymphocytes, Absolute 1.61 10*3/mm3      Monocytes, Absolute 0.77 10*3/mm3      Eosinophils, Absolute 0.24 10*3/mm3      Basophils, Absolute 0.09 10*3/mm3      Immature Grans, Absolute 0.08 10*3/mm3      nRBC 0.0 /100 WBC           Imaging Results (Last 72 Hours)       ** No results found for the last 72 hours. **                Medication Review  Scheduled Meds:dilTIAZem, 10 mg, Intravenous, Once  heparin, 5,000 Units, Intravenous, Once      Continuous Infusions:dilTIAZem, 5-15 mg/hr  heparin, 8.13 Units/kg/hr      PRN Meds:.  heparin    heparin    [COMPLETED] Insert Peripheral IV **AND** sodium chloride    Lab Results   Component Value Date    GLUCOSE 135 (H) 08/30/2024    BUN 17 08/30/2024    CREATININE 1.25 08/30/2024    EGFR 72.8 08/30/2024    BCR 13.6 08/30/2024    K 3.6 08/30/2024    CO2 23.8 08/30/2024    CALCIUM 10.4 08/30/2024    ALBUMIN 4.4 08/30/2024    BILITOT 0.3 08/30/2024     (H) 08/30/2024    ALT 77 (H) 08/30/2024     No results found for this or any previous visit.        Lab Results   Component Value Date    CHOL 245 (H) 05/06/2024    TRIG 129 05/06/2024    HDL 30 (L) 05/06/2024      (H) 2024            Assessment & Plan       * No active hospital problems. *          Stef Hercules MD  24  14:24 EDT                 Electronically signed by Stef Hercules MD at 24 1629          Discharge Summary        Brammell, Timothy Duane, MD at 24 1250                       [facility] Medicine Services  Discharge Summary    Date of Service: 2024  Patient Name: Scar Velásquez  : 1980  MRN: [id number]    Date of Admission: 2024  Discharge Diagnosis:       Acute nontransmural myocardial infarct  Coronary artery disease status post percutaneous intervention  Transaminitis, possible fatty liver  Atrial fibs  Syncope  Date of Discharge: 2024  Primary Care Physician: Gaetano Zhu PA-C      Presenting Problem:   Acute coronary syndrome [I24.9]  Subendocardial MI first episode care [I21.4]  Atrial fibrillation with rapid ventricular response [I48.91]  Syncope, unspecified syncope type [R55]  STEMI (ST elevation myocardial infarction) [I21.3]    Active and Resolved Hospital Problems:  Active Hospital Problems    Diagnosis POA    **Acute coronary syndrome [I24.9] Yes    Attention deficit hyperactivity disorder (ADHD), predominantly inattentive type [F90.0] Yes    Elevated blood pressure reading [R03.0] Yes      Resolved Hospital Problems    Diagnosis POA    STEMI (ST elevation myocardial infarction) [I21.3] Yes         Hospital Course     HPI:  Per the H&P dated 2024        This is a 44-year-old white male who presented with issues as discussed in history and physical.  I was asked to discharge patient after cardiology clearance on my initial day of contact. He remained afebrile during his hospitalization.  Oxygenation was maintained on room air.  Blood pressures remained well-controlled with additional antihypertensives.  No microbiology or radiographic data.  It is noted initial troponin elevation.  He had had  syncopal episode and identified atrial fibs on presentation.  He had some moderate liver function abnormalities and had no viral hepatitis studies.  CBC was nonremarkable.  Thyroid functions were not obtained.  Cholesterol level was elevated.  He underwent cardiac catheterization and subsequent stenting as detailed in catheterization report.  It was thought by cardiology that he could be discharged and followed up in the office.  There was a noted discussion as to a Holter monitor for any recurrence of atrial fibrillation.  He has not felt that he merited anticoagulation at present.  Medication was discussed at time of discharge.  He will continue to monitor blood pressure and record for office review and follow-up.  He otherwise will benefit from cardiac rehab that should be arranged as an outpatient.  He otherwise should follow-up with his liver functions and any further needed evaluation when followed up with primary care.      Vital Signs:  Temp:  [97.8 °F (36.6 °C)-98.7 °F (37.1 °C)] 98.7 °F (37.1 °C)  Heart Rate:  [59-95] 77  Resp:  [16] 16  BP: (109-144)/(77-99) 144/99    Physical Exam:  Physical Exam  Vitals reviewed.   Constitutional:       Appearance: Normal appearance.   HENT:      Head: Normocephalic.   Cardiovascular:      Rate and Rhythm: Normal rate and regular rhythm.   Pulmonary:      Effort: Pulmonary effort is normal.      Breath sounds: Normal breath sounds.   Abdominal:      General: Bowel sounds are normal.      Palpations: Abdomen is soft.      Tenderness: There is no abdominal tenderness.   Musculoskeletal:         General: No swelling.   Neurological:      Mental Status: He is alert. Mental status is at baseline.            Pertinent  and/or Most Recent Results     LAB RESULTS:      Lab 08/31/24  0343 08/30/24  1329   WBC 15.13* 16.46*   HEMOGLOBIN 14.2 14.3   HEMATOCRIT 42.6 42.1   PLATELETS 250 257   NEUTROS ABS  --  13.67*   IMMATURE GRANS (ABS)  --  0.08*   LYMPHS ABS  --  1.61   MONOS  ABS  --  0.77   EOS ABS  --  0.24   MCV 88.0 85.7   PROTIME  --  10.7   APTT 28.2* 25.0*         Lab 08/31/24  0343 08/30/24  1329   SODIUM 140 142   POTASSIUM 3.9 3.6   CHLORIDE 104 106   CO2 28.2 23.8   ANION GAP 7.8 12.2   BUN 12 17   CREATININE 1.04 1.25   EGFR 90.8 72.8   GLUCOSE 98 135*   CALCIUM 9.4 10.4   MAGNESIUM  --  2.1         Lab 08/30/24  1329   TOTAL PROTEIN 6.8   ALBUMIN 4.4   GLOBULIN 2.4   ALT (SGPT) 77*   AST (SGOT) 104*   BILIRUBIN 0.3   ALK PHOS 71         Lab 08/30/24  1329   HSTROP T 140*   PROTIME 10.7   INR 0.98         Lab 08/31/24  0343   CHOLESTEROL 236*   LDL CHOL 175*   HDL CHOL 32*   TRIGLYCERIDES 156*             Brief Urine Lab Results       None          Microbiology Results (last 10 days)       ** No results found for the last 240 hours. **                         Results for orders placed during the hospital encounter of 08/30/24    Adult Transthoracic Echo Complete W/ Cont if Necessary Per Protocol    Interpretation Summary    Left ventricular systolic function is normal. Calculated left ventricular EF = 58% Left ventricular ejection fraction appears to be 56 - 60%.    Left ventricular wall thickness is consistent with mild concentric hypertrophy.    Left ventricular diastolic function is consistent with (grade I) impaired relaxation.    The left atrial cavity is mildly dilated.    Estimated right ventricular systolic pressure from tricuspid regurgitation is normal (<35 mmHg).      Labs Pending at Discharge:      Procedures Performed  Procedure(s):  Left Heart Cath         Consults:   Consults       Date and Time Order Name Status Description    8/31/2024  9:28 AM Inpatient Hospitalist Consult Completed     8/30/2024  4:25 PM Inpatient Intensivist Consult                Discharge Details        Discharge Medications        New Medications        Instructions Start Date   aspirin 81 MG EC tablet   81 mg, Oral, Daily   Start Date: September 2, 2024     atorvastatin 40 MG  tablet  Commonly known as: LIPITOR   40 mg, Oral, Nightly      losartan 25 MG tablet  Commonly known as: Cozaar   25 mg, Oral, Daily      metoprolol tartrate 25 MG tablet  Commonly known as: LOPRESSOR   25 mg, Oral, Every 12 Hours Scheduled      nitroglycerin 0.4 MG SL tablet  Commonly known as: NITROSTAT   0.4 mg, Sublingual, Every 5 Minutes PRN, Take no more than 3 doses in 15 minutes.      ticagrelor 90 MG tablet tablet  Commonly known as: BRILINTA   90 mg, Oral, 2 Times Daily             Continue These Medications        Instructions Start Date   lisdexamfetamine 50 MG capsule  Commonly known as: Vyvanse   50 mg, Oral, Every Morning               No Known Allergies      Discharge Disposition: home  Home or Self Care    Diet:  Hospital:  Diet Order   Procedures    Diet: Cardiac; Healthy Heart (2-3 Na+); Fluid Consistency: Thin (IDDSI 0)         Discharge Activity:         CODE STATUS:  Code Status and Medical Interventions: CPR (Attempt to Resuscitate); Full Support   Ordered at: 08/30/24 3997     Level Of Support Discussed With:    Patient     Code Status (Patient has no pulse and is not breathing):    CPR (Attempt to Resuscitate)     Medical Interventions (Patient has pulse or is breathing):    Full Support         No future appointments.    Additional Instructions for the Follow-ups that You Need to Schedule       Discharge Follow-up with PCP   As directed       Currently Documented PCP:    Gaetano Zhu PA-C    PCP Phone Number:    [phone]     Follow Up Details: one week        Discharge Follow-up with Specified Provider: cardiology; 2 Weeks   As directed      To: cardiology   Follow Up: 2 Weeks                Time spent on Discharge including face to face service:  >30 minutes    Signature: Electronically signed by Timothy Duane Brammell, MD, 09/01/24, 12:51 EDT.  [facility] Hospitalist Team     Electronically signed by Brammell, Timothy Duane, MD at 09/01/24 8275

## 2024-09-03 NOTE — TELEPHONE ENCOUNTER
Caller: Scar Velásquez    Relationship to patient: Self    Best call back number: [phone]     Chief complaint: COLLASPED AT WORK    Type of visit: HOSP FU    Requested date: 2 WEEKS (9.15.24)     Additional notes:ED NOTE WANTS PT TO FU IN 2 WEEKS (9.15.24) NO OPENINGS UNTIL OCTOBER WHICH IS OUTSIDE OF THAT 2 WEEK TIMEFRAME.

## 2024-09-03 NOTE — OUTREACH NOTE
Call Center TCM Note      Flowsheet Row Responses   [facility] patient discharged from? [facility]   Does the patient have one of the following disease processes/diagnoses(primary or secondary)? Acute MI (STEMI,NSTEMI)   TCM attempt successful? Yes   Call start time 1501   Call end time 1504   Discharge diagnosis Acute coronary syndrome   STEMI (ST christa   Meds reviewed with patient/caregiver? Yes   Is the patient having any side effects they believe may be caused by any medication additions or changes? No   Does the patient have all prescriptions related to this admission filled (includes statins,anticoagulants,HTN meds,anti-arrhythmia meds) Yes   Prescription comments New rx's asa, Atorvastatin, Losartan, Metoprolol tartrate, nigtroglycerin, ticagrelor in place.   Is the patient taking all medications as directed (includes completed medication regime)? Yes   Does the patient have an appointment with their PCP within 7-14 days of discharge? Yes   Has home health visited the patient within 72 hours of discharge? N/A   Psychosocial issues? No   Did the patient receive a copy of their discharge instructions? Yes   Nursing interventions Reviewed instructions with patient   What is the patient's perception of their health status since discharge? Improving   Nursing interventions Nurse provided patient education   Is the patient/caregiver able to teach back signs and symptoms of when to call for help immediately: Sudden chest discomfort, Nausea or vomiting, Dizziness or lightheadedness, Sudden discomfort in arms, back, neck or jaw, Shortness of breath at any time, Sudden sweating or clammy skin, Irregular or rapid heart rate  [Pt denies any of these symptoms]   Nursing interventions Nurse provided patient education   Is the patient/caregiver able to teach back lifestyle changes to help prevent MIs Managing diabetes, Reducing stress, Regular exercise as approved by provider, Heart healthy diet, Limiting alcohol intake,  Maintaining a healthy weight   Is the patient/caregiver able to teach back ways to prevent a second heart attack: Take medications, Manage risk factors, Get support (AHA website:supportnetwork.heart.org), Follow up with MD, Participate in Cardiac Rehab   If the patient is a current smoker, are they able to teach back resources for cessation? Not a smoker   Is the patient/caregiver able to teach back the hierarchy of who to call/visit for symptoms/problems? PCP, Specialist, Home health nurse, Urgent Care, ED, 911 Yes   TCM call completed? Yes   Wrap up additional comments D/C DX: Acute coronary syndrome,  NSTEMI,  heart cath - Pt states he is doing well, feels good. No questions. TCM APPT with PCP office is 09/05/2024, [facility] Follow up is 09/17/2024   Call end time 7070            Ludivina Conn MA    9/3/2024, 15:07 EDT

## 2024-09-04 DIAGNOSIS — F90.0 ATTENTION DEFICIT HYPERACTIVITY DISORDER (ADHD), PREDOMINANTLY INATTENTIVE TYPE: ICD-10-CM

## 2024-09-05 ENCOUNTER — OFFICE VISIT (OUTPATIENT)
Dept: FAMILY MEDICINE CLINIC | Facility: CLINIC | Age: 44
End: 2024-09-05
Payer: COMMERCIAL

## 2024-09-05 VITALS
WEIGHT: 279.6 LBS | SYSTOLIC BLOOD PRESSURE: 128 MMHG | HEART RATE: 77 BPM | BODY MASS INDEX: 37.05 KG/M2 | OXYGEN SATURATION: 96 % | DIASTOLIC BLOOD PRESSURE: 84 MMHG | RESPIRATION RATE: 16 BRPM | HEIGHT: 73 IN

## 2024-09-05 DIAGNOSIS — F90.0 ATTENTION DEFICIT HYPERACTIVITY DISORDER (ADHD), PREDOMINANTLY INATTENTIVE TYPE: ICD-10-CM

## 2024-09-05 PROCEDURE — 99495 TRANSJ CARE MGMT MOD F2F 14D: CPT | Performed by: INTERNAL MEDICINE

## 2024-09-05 RX ORDER — LISDEXAMFETAMINE DIMESYLATE 50 MG/1
50 CAPSULE ORAL EVERY MORNING
Qty: 30 CAPSULE | Refills: 0 | Status: CANCELLED | OUTPATIENT
Start: 2024-09-05

## 2024-09-05 RX ORDER — LISDEXAMFETAMINE DIMESYLATE 50 MG/1
50 CAPSULE ORAL EVERY MORNING
Qty: 30 CAPSULE | Refills: 0 | OUTPATIENT
Start: 2024-09-05

## 2024-09-05 NOTE — PROGRESS NOTES
Transitional Care Follow Up Visit  Subjective     Scar Velásquez is a 44 y.o. male who presents for a transitional care management visit.    Within 48 business hours after discharge our office contacted him via telephone to coordinate his care and needs.      I reviewed and discussed the details of that call along with the discharge summary, hospital problems, inpatient lab results, inpatient diagnostic studies, and consultation reports with Scar.     Current outpatient and discharge medications have been reconciled for the patient.  Reviewed by: Michael Molina MD          9/1/2024     3:26 PM   Date of TCM Phone Call   [facility]   Date of Admission 8/30/2024   Discharge Disposition Home or Self Care     Risk for Readmission (LACE) Score: 6 (9/1/2024  6:00 AM)      History of Present Illness   Course During Hospital Stay:      Patient recently admitted to [facility] from 8/30/2024 until 9/1/2024 with ACS with myocardial infarction after syncopal event.  Patient had been experiencing chest discomfort off and on for a couple weeks prior to presentation.  He had syncopal event while at work that required CPR and AED with shock.  Patient regained consciousness and was transported to hospital for further evaluation.  Labs notable for elevated troponin.  EKG with atrial fibrillation with heart rate approximately 100-110.  Patient admitted for further evaluation.  Cardiology consulted.  Patient taken to undergo cardiac catheterization with stent placed to RCA.  Patient also had stenosis involving mid LAD which was recommended for medical therapy.  Patient recommended follow-up with PCP and cardiology as an outpatient.    Since discharge, patient overall doing well. He has not had any recurrent symptoms that he was experiencing before hospitalization. Denies chest pain, shortness of breath, orthopnea, PND, lower extremity edema, palpitations, tachycardia. He does not feel that he is atrial  "fibrillation and is wearing cardiac monitor for one month. Compliant with medication. Blood pressure overall have been good on current medications including losartan 25 mg daily. Follow up scheduled with cardiology.         The following portions of the patient's history were reviewed and updated as appropriate: allergies, current medications, past family history, past medical history, past social history, past surgical history, and problem list.    Review of Systems  Review of systems negative unless otherwise noted in HPI.      Objective   /84   Pulse 77   Resp 16   Ht 185.4 cm (73\")   Wt 127 kg (279 lb 9.6 oz)   SpO2 96%   BMI 36.89 kg/m²   Physical Exam    Assessment & Plan     Coronary artery disease  Assessment: Patient overall doing well after recent stent placement.  Additional narrowing noted for which medical therapy recommended. Currently on aspirin 81 mg daily, Brilinta 90 mg twice daily, and atorvastatin 40 mg daily.  Incision site well-healing without evidence of infection.  Patient aware of medication compliance, follow-up, as well as signs and symptoms which should prompt reevaluation.  Plan:   - Continue aspirin, Brilinta, atorvastatin, and metoprolol as prescribed  - Follow-up with cardiology as scheduled  - Encourage healthy diet and activity as within recommendations of cardiology  - Hold on Vyvanse in the setting of recent MI    New onset atrial fibrillation  Assessment:  [id number] score of 2.  Started on metoprolol 25 mg twice daily.  Not currently on anticoagulation, although would likely benefit long-term to reduce stroke risk.  Plan:  - Mobile cardiac outpatient telemetry  - Timing of potential anticoagulation per cardiology, especially while on DAPT    Syncope  Most likely secondary to possible ACS, atrial fibrillation.  No recurrent episodes. Overall doing well status post coronary angiogram with PCI and NEETU.        Dr Michael Molina   Internal Medicine Physician  [facility]  [address], [address]  [place] [address]

## 2024-09-05 NOTE — PATIENT INSTRUCTIONS
Date of Procedure: 02/27/19


Procedure(s) Performed: 


BRIEF HISTORY: Patient is a 81-year-old pleasant white female, scheduled for an 

elective colonoscopy as a part of surveillance of prior history of colon 

polyps.  Her father was diagnosed with colon cancer at age 80.  Last 

colonoscopy was 4 years ago and was noted to have colon polyps.





PROCEDURE PERFORMED: Colonoscopy with snare polypectomy. 





PREOPERATIVE DIAGNOSIS: Family history of colon cancer/history of colon polyps. 





IV sedation per Anesthesia. 





PROCEDURE: After informed consent was obtained, the patient, was brought into 

the endoscopy unit. IV sedation was administered by Anesthesia under continuous 

monitoring.  Digital rectal examination was normal. Initially the Olympus CF-

160 flexible video colonoscope was then inserted in the rectum, gradually 

advanced into the cecum without any difficulty. Careful examination was 

performed as the scope was gradually being withdrawn. Ileocecal valve and the 

appendiceal orifice were visualized and appeared normal.  Prep was excellent. 

Mucosa of the cecum appeared normal.  In the ascending colon there was a 1 cm 

flat polyp that was removed by snare polypectomy.  Rest of the, ascending colon

, transverse colon, descending colon, sigmoid colon, and rectum appeared 

normal. Retroflexion was performed in the rectum and no lesions were seen. The 

patient tolerated the procedure well. 





IMPRESSION: 


1 cm flat ascending colon polyp status post polypectomy


Rest of the colon appeared normal





RECOMMENDATIONS:  Findings of this examination were discussed with the patient 

as well as her family.  She was advised to follow with the biopsy results and 

have a repeat surveillance colonoscopy in 3 years from now based on her overall 

medical condition.. No labs today; up to date    Medications:  Continue medications as prescribed  Hold Vyvanse for now    Follow up with cardiology as scheduled    Encourage healthy diet and activity per cardiology    Follow up for annual preventative care visit or sooner if something arises

## 2024-09-10 ENCOUNTER — READMISSION MANAGEMENT (OUTPATIENT)
Dept: CALL CENTER | Facility: HOSPITAL | Age: 44
End: 2024-09-10
Payer: COMMERCIAL

## 2024-09-10 NOTE — OUTREACH NOTE
AMI Week 2 Survey      Flowsheet Row Responses   [facility]   Does the patient have one of the following disease processes/diagnoses(primary or secondary)? Acute MI (STEMI,NSTEMI)   Week 2 attempt successful? No   Unsuccessful attempts Attempt 1            Sonia MICHEL - Licensed Nurse

## 2024-09-13 ENCOUNTER — READMISSION MANAGEMENT (OUTPATIENT)
Dept: CALL CENTER | Facility: HOSPITAL | Age: 44
End: 2024-09-13
Payer: COMMERCIAL

## 2024-09-13 NOTE — OUTREACH NOTE
AMI Week 2 Survey      Flowsheet Row Responses   [facility] patient discharged from? [facility]   Does the patient have one of the following disease processes/diagnoses(primary or secondary)? Acute MI (STEMI,NSTEMI)   Week 2 attempt successful? Yes   Call start time 1518   Call end time 1520   Discharge diagnosis Acute coronary syndrome   STEMI   Is the patient taking all medications as directed (includes completed medication regime)? Yes   Comments regarding appointments Hospital follow up appt with cardiology on 9/17   Does the patient have a primary care provider?  Yes   Comments regarding PCP seen PCP on 9/5 for a hospital follow up appt   Has the patient kept scheduled appointments due by today? Yes   Has home health visited the patient within 72 hours of discharge? N/A   Psychosocial issues? No   What is the patient's perception of their health status since discharge? Improving   Nursing interventions Nurse provided patient education   Is the patient/caregiver able to teach back signs and symptoms of when to call for help immediately: Sudden chest discomfort, Sudden sweating or clammy skin, Shortness of breath at any time, Sudden discomfort in arms, back, neck or jaw, Nausea or vomiting, Dizziness or lightheadedness, Irregular or rapid heart rate   Nursing interventions Nurse provided patient education   Is the patient/caregiver able to teach back ways to prevent a second heart attack: Take medications, Follow up with MD   If the patient is a current smoker, are they able to teach back resources for cessation? Not a smoker   Is the patient/caregiver able to teach back the hierarchy of who to call/visit for symptoms/problems? PCP, Specialist, Home health nurse, Urgent Care, ED, 911 Yes   Additional teach back comments States he is wearing a heart monitor   Week 2 call completed? Yes   Revoked No further contact(revokes)-requires comment   Is the patient interested in additional calls from an ambulatory case  manager? No   Would this patient benefit from a Referral to [facility] Social Work? No   Wrap up additional comments Quick call with patient, states he is doing well, has been to see his PCP and will be seeing cardiology next week, no further calls needed.   Call end time 1520            Inna FLORES - Registered Nurse

## 2024-09-17 ENCOUNTER — OFFICE VISIT (OUTPATIENT)
Dept: CARDIOLOGY | Facility: CLINIC | Age: 44
End: 2024-09-17
Payer: COMMERCIAL

## 2024-09-17 VITALS
OXYGEN SATURATION: 97 % | SYSTOLIC BLOOD PRESSURE: 119 MMHG | HEART RATE: 69 BPM | BODY MASS INDEX: 37.24 KG/M2 | WEIGHT: 281 LBS | DIASTOLIC BLOOD PRESSURE: 77 MMHG | HEIGHT: 73 IN

## 2024-09-17 DIAGNOSIS — I24.9 ACUTE CORONARY SYNDROME: ICD-10-CM

## 2024-09-17 DIAGNOSIS — Z98.61 CAD S/P PERCUTANEOUS CORONARY ANGIOPLASTY: Primary | ICD-10-CM

## 2024-09-17 DIAGNOSIS — E78.5 HYPERLIPIDEMIA, UNSPECIFIED HYPERLIPIDEMIA TYPE: ICD-10-CM

## 2024-09-17 DIAGNOSIS — I48.0 PAF (PAROXYSMAL ATRIAL FIBRILLATION): ICD-10-CM

## 2024-09-17 DIAGNOSIS — I10 PRIMARY HYPERTENSION: ICD-10-CM

## 2024-09-17 DIAGNOSIS — I25.10 CAD S/P PERCUTANEOUS CORONARY ANGIOPLASTY: Primary | ICD-10-CM

## 2024-09-18 PROBLEM — E78.5 HLD (HYPERLIPIDEMIA): Status: ACTIVE | Noted: 2024-09-18

## 2024-09-30 DIAGNOSIS — F90.0 ATTENTION DEFICIT HYPERACTIVITY DISORDER (ADHD), PREDOMINANTLY INATTENTIVE TYPE: ICD-10-CM

## 2024-09-30 NOTE — TELEPHONE ENCOUNTER
Caller: RUGGIEROJESS    Relationship: Emergency Contact    Best call back number: [phone]     Requested Prescriptions:   Requested Prescriptions     Pending Prescriptions Disp Refills    lisdexamfetamine (Vyvanse) 50 MG capsule 30 capsule 0     Sig: Take 1 capsule by mouth Every Morning        Pharmacy where request should be sent:  [facility]/pharmacy #[id number] - [place], [place] - [address] - [phone]  - [phone] FX     Last office visit with prescribing clinician: 5/6/2024   Last telemedicine visit with prescribing clinician: Visit date not found   Next office visit with prescribing clinician: Visit date not found     Additional details provided by patient: PATIENT HAS BEEN CLEARED BY CARDIOLOGY TO RESTART THIS MEDICATION.    Does the patient have less than a 3 day supply:  [x] Yes  [] No    Would you like a call back once the refill request has been completed: [] Yes [] No    If the office needs to give you a call back, can they leave a voicemail: [] Yes [] No    April Zainab Thrasher Rep   09/30/24 15:00 EDT

## 2024-10-01 RX ORDER — LISDEXAMFETAMINE DIMESYLATE 50 MG/1
50 CAPSULE ORAL EVERY MORNING
Qty: 30 CAPSULE | Refills: 0 | Status: SHIPPED | OUTPATIENT
Start: 2024-10-01

## 2024-10-29 RX ORDER — LOSARTAN POTASSIUM 25 MG/1
25 TABLET ORAL DAILY
Qty: 30 TABLET | Refills: 3 | Status: SHIPPED | OUTPATIENT
Start: 2024-10-29

## 2024-10-29 NOTE — TELEPHONE ENCOUNTER
Caller: JESS RUGGIERO    Relationship: Emergency Contact    Best call back number: [phone]    Requested Prescriptions:   Requested Prescriptions     Pending Prescriptions Disp Refills    apixaban (ELIQUIS) 5 MG tablet tablet 60 tablet 11     Sig: Take 1 tablet by mouth 2 (Two) Times a Day.    ticagrelor (BRILINTA) 90 MG tablet tablet 60 tablet 1     Sig: Take 1 tablet by mouth 2 (Two) Times a Day.    losartan (Cozaar) 25 MG tablet 30 tablet 1     Sig: Take 1 tablet by mouth Daily.        Pharmacy where request should be sent: [facility]/PHARMACY #[id number] [place], [place] - [address] [place] [address] [phone] [facility] [phone]      Last office visit with prescribing clinician: Visit date not found   Last telemedicine visit with prescribing clinician: Visit date not found   Next office visit with prescribing clinician: 11/12/2024     Additional details provided by patient: PATIENT WANTS DR AGUILAR TO TAKE OVER PRESCRIPTION FOR LOSARTAN. PATIENT WAS GIVEN TO HIM DURING HOSPITAL STAY FOR HEART ATTACK. PATIENT HAS TWO DAYS LEFT OF THESE MEDICATIONS.    Does the patient have less than a 3 day supply:  [x] Yes  [] No    Would you like a call back once the refill request has been completed: [x] Yes [] No    If the office needs to give you a call back, can they leave a voicemail: [x] Yes [] No    Zainab Gomez Rep   10/29/24 13:34 EDT

## 2024-10-30 ENCOUNTER — TELEPHONE (OUTPATIENT)
Dept: CARDIOLOGY | Facility: CLINIC | Age: 44
End: 2024-10-30
Payer: COMMERCIAL

## 2024-10-30 NOTE — TELEPHONE ENCOUNTER
I called [facility] and they have refills available. They will get it filled and notify when ready. Message left for patient.     OK for HUB to inform

## 2024-10-30 NOTE — TELEPHONE ENCOUNTER
Caller: Scar Velásquez JUNIOR    Relationship: Self    Best call back number: [phone]    Requested Prescriptions:   Requested Prescriptions     Pending Prescriptions Disp Refills    apixaban (ELIQUIS) 5 MG tablet tablet 60 tablet 11     Sig: Take 1 tablet by mouth 2 (Two) Times a Day.        Pharmacy where request should be sent: [facility]/PHARMACY #[id number] - [facility] [place] [address] [phone] [facility] [phone]      Last office visit with prescribing clinician: Visit date not found   Last telemedicine visit with prescribing clinician: Visit date not found   Next office visit with prescribing clinician: 11/12/2024     Additional details provided by patient: PT HAS ONE DAY LEFT OF MEDICATION    Does the patient have less than a 3 day supply:  [x] Yes  [] No    Would you like a call back once the refill request has been completed: [] Yes [x] No    If the office needs to give you a call back, can they leave a voicemail: [] Yes [x] No    Zainab Mesnah Rep   10/30/24 11:12 EDT

## 2024-11-11 ENCOUNTER — TELEPHONE (OUTPATIENT)
Dept: FAMILY MEDICINE CLINIC | Facility: CLINIC | Age: 44
End: 2024-11-11
Payer: COMMERCIAL

## 2024-11-11 DIAGNOSIS — F90.0 ATTENTION DEFICIT HYPERACTIVITY DISORDER (ADHD), PREDOMINANTLY INATTENTIVE TYPE: ICD-10-CM

## 2024-11-11 NOTE — TELEPHONE ENCOUNTER
Caller: JESS RUGGIERO    Relationship: Emergency Contact    Best call back number: [phone]     Requested Prescriptions:   Requested Prescriptions     Pending Prescriptions Disp Refills    lisdexamfetamine (Vyvanse) 50 MG capsule 30 capsule 0     Sig: Take 1 capsule by mouth Every Morning        Pharmacy where request should be sent: [facility] [id number] [facility] - [place], [place] - [address] [phone] [facility] [phone]      Last office visit with prescribing clinician: 5/6/2024   Last telemedicine visit with prescribing clinician: Visit date not found   Next office visit with prescribing clinician: Visit date not found     Additional details provided by patient: PHARMACY ORIGINALLY SENT TO DOES NOT HAVE IN STOCK     Does the patient have less than a 3 day supply:  [x] Yes  [] No    Would you like a call back once the refill request has been completed: [] Yes [x] No    If the office needs to give you a call back, can they leave a voicemail: [] Yes [x] No    Zainab Hill Rep   11/11/24 11:13 EST

## 2024-11-12 ENCOUNTER — OFFICE VISIT (OUTPATIENT)
Dept: CARDIOLOGY | Facility: CLINIC | Age: 44
End: 2024-11-12
Payer: COMMERCIAL

## 2024-11-12 VITALS
HEIGHT: 73 IN | SYSTOLIC BLOOD PRESSURE: 118 MMHG | HEART RATE: 59 BPM | DIASTOLIC BLOOD PRESSURE: 75 MMHG | OXYGEN SATURATION: 97 % | BODY MASS INDEX: 38.17 KG/M2 | WEIGHT: 288 LBS

## 2024-11-12 DIAGNOSIS — I24.9 ACUTE CORONARY SYNDROME: ICD-10-CM

## 2024-11-12 DIAGNOSIS — E78.2 MIXED HYPERLIPIDEMIA: ICD-10-CM

## 2024-11-12 DIAGNOSIS — Z98.61 CAD S/P PERCUTANEOUS CORONARY ANGIOPLASTY: Primary | ICD-10-CM

## 2024-11-12 DIAGNOSIS — I10 PRIMARY HYPERTENSION: ICD-10-CM

## 2024-11-12 DIAGNOSIS — I25.10 CAD S/P PERCUTANEOUS CORONARY ANGIOPLASTY: Primary | ICD-10-CM

## 2024-11-12 DIAGNOSIS — E78.5 HYPERLIPIDEMIA LDL GOAL <100: ICD-10-CM

## 2024-11-12 DIAGNOSIS — I48.0 PAF (PAROXYSMAL ATRIAL FIBRILLATION): ICD-10-CM

## 2024-11-12 PROCEDURE — 99214 OFFICE O/P EST MOD 30 MIN: CPT | Performed by: INTERNAL MEDICINE

## 2024-11-12 RX ORDER — METOPROLOL TARTRATE 25 MG/1
12.5 TABLET, FILM COATED ORAL 2 TIMES DAILY
Qty: 90 TABLET | Refills: 3 | Status: SHIPPED | OUTPATIENT
Start: 2024-11-12

## 2024-11-12 RX ORDER — ASPIRIN 81 MG/1
81 TABLET ORAL DAILY
COMMUNITY
Start: 2024-11-12

## 2024-11-12 NOTE — PROGRESS NOTES
Cardiology Office Visit      Encounter Date:  11/12/2024    Patient ID:   Scar Velásquez is a 44 y.o. male.    Reason For Followup:  Coronary artery disease  Recent myocardial infarction    Brief Clinical History:  Dear Gaetano Larios PA-C    I had the pleasure of seeing Scar Velásquez today. As you are well aware, this is a 44 y.o. male recently hospitalized after he collapsed at work, suffering cardiac arrest, in which bystander CPR with external AED shocks was performed.  He underwent heart cath s/p PCI with NEETU to the RCA with residual 80% LAD stenosis managed medically at this point, and complicated by atrial fibrillation.  2D echo showed an EF 56-60% with grade 1 diastolic dysfunction and no significant VHD.  PMH includes HTN, HLD, and ADHD.  He does not smoke.     Patient has returned to work in grain processing.  He states that there is potential for heavy lifting up to 25 lbs, but he has not done this recently.  He denies any chest discomfort or shortness of breath since discharge.  He now recognizes that his cardiac arrest was preceded by chest pain, which he attributed to indigestion at the time.  He denies any post-op problems with his groin site.  He is still wearing the heart monitor and denies any further palpitations.        Interval History:  Planing of some fatigue and weakness  Denies any chest pain  No new cardiac symptoms  No atrial or ventricular arrhythmias  Clinically feels better  No recurrence of atrial fibrillation    Assessment & Plan    Impressions:    Acute coronary syndrome  Shocked by external AED  Abnormal EKG  Abnormal EKG with inferior Q waves and nonspecific ST changes  Syncope  Newly diagnosed atrial fibrillation/proximal A-fib currently in sinus rhythm with no recurrence of symptoms  Hyperlipidemia  Severe two-vessel coronary artery disease  100% occlusion of the mid right coronary artery culprit vessel for patient acute coronary syndrome and possible arrhythmia and  "syncope treated with placement of Xience drug-eluting stent  Significant stenosis involving the mid LAD plan to manage conservatively medical therapy at this time  Echocardiogram to assess LV systolic function  Continued aggressive risk factor modification  High-dose statin  Maykel vascular score of 2    Recommendations:  Patient likes to come off the anticoagulation therapy secondary to significant bleeding and bruising  There is no recurrence of atrial fibrillation  Episode of atrial fibrillation in the setting of acute myocardial infarction with no recurrence of atrial fibrillation  Close monitoring of heart rhythm at home  Okay to discontinue Eliquis  Start patient on aspirin 81 mg p.o. once a day  Continue Brilinta 90 mg p.o. twice daily  Metoprolol 12.5 mg p.o. twice daily Lipitor 40 mg p.o. once a day losartan 25 mg p.o. once a day  Risk benefits and alternatives reviewed and discussed with patient  Decrease the dose of metoprolol secondary to symptoms of fatigue and weakness  Continue current medical therapy  Continued aggressive risk factor modification  Recheck lipids in few weeks  Schedule for a myocardial perfusion study for further restratification for LAD stenosis that is a residual disease  Prior workup and labs and medications reviewed and discussed with patient and family  Follow-up in office in 3 months      Vitals:  Vitals:    11/12/24 1350   BP: 118/75   Pulse: 59   SpO2: 97%   Weight: 131 kg (288 lb)   Height: 185.4 cm (73\")       Physical Exam:    General: Alert, cooperative, no distress, appears stated age  Head:  Normocephalic, atraumatic, mucous membranes moist  Eyes:  Conjunctiva/corneas clear, EOM's intact     Neck:  Supple,  no adenopathy;      Lungs: Clear to auscultation bilaterally, no wheezes rhonchi rales are noted  Chest wall: No tenderness  Heart::  Regular rate and rhythm, S1 and S2 normal, no murmur, rub or gallop  Abdomen: Soft, non-tender, nondistended bowel sounds " active  Extremities: No cyanosis, clubbing, or edema  Pulses: 2+ and symmetric all extremities  Skin:  No rashes or lesions  Neuro/psych: A&O x3. CN II through XII are grossly intact with appropriate affect              Lab Results   Component Value Date    GLUCOSE 98 08/31/2024    BUN 12 08/31/2024    CREATININE 1.04 08/31/2024    EGFR 90.8 08/31/2024    BCR 11.5 08/31/2024    K 3.9 08/31/2024    CO2 28.2 08/31/2024    CALCIUM 9.4 08/31/2024    ALBUMIN 4.4 08/30/2024    BILITOT 0.3 08/30/2024     (H) 08/30/2024    ALT 77 (H) 08/30/2024     Results for orders placed during the hospital encounter of 08/30/24    Adult Transthoracic Echo Complete W/ Cont if Necessary Per Protocol    Interpretation Summary    Left ventricular systolic function is normal. Calculated left ventricular EF = 58% Left ventricular ejection fraction appears to be 56 - 60%.    Left ventricular wall thickness is consistent with mild concentric hypertrophy.    Left ventricular diastolic function is consistent with (grade I) impaired relaxation.    The left atrial cavity is mildly dilated.    Estimated right ventricular systolic pressure from tricuspid regurgitation is normal (<35 mmHg).     Results for orders placed during the hospital encounter of 08/30/24    Cardiac Catheterization/Vascular Study    Conclusion  Table formatting from the original result was not included.  Cardiac Catheterization with PCI Report    Scar W Didier  [id number]  8/30/2024  @PCP@    He underwent cardiac catheterization and percutaneous coronary intervention.    Indications for the procedure include: acute coronary syndrome.  Acute myocardial infarction  Chest discomfort  External AICD shock for possible ventricular arrhythmia  Syncope  Abnormal troponin  Abnormal EKG    Procedure Details:  The risks, benefits, complications, treatment options, and expected outcomes were discussed with the patient. The patient and/or family concurred with the proposed plan,  giving informed consent.    After informed consent the patient was brought to the cath lab after appropriate IV hydration was begun and oral premedication was given. He was further sedated with fentanyl. He was prepped and draped in the usual manner. Using the modified Seldinger access technique, a 6 Macanese sheath was placed in the femoral artery. A left heart catheterization with coronary arteriography was performed. Findings are discussed below.    The decision was made to proceed with intervention. He received Heparin as per protocol. The details of the intervention are as follows:    For the interventional procedure we used a JR4 guide catheter with a Inkblazers guidewire initially and then eventually switched to DonorPro wire Entroease rebellion support across the lesion in the distal right coronary artery lesion is predilated with a 3.0 x 12 mm noncompliant balloon up to 8 isabella and lesion is stented with a 3.5 x 23 mm Xience drug-eluting stent that was deployed at 10 isabella with good angiographic results.  Patient tolerated procedure well with no immediate possible complication plan to obtain hemostasis by manual pressure.  Residual anticoagulation was heparin patient received Integrilin bolus and drip at the end of the procedure.  Patient also received 180 mg of Brilinta at the end of the procedure.    After the procedure was completed, sedation was stopped and the sheaths and catheters were all removed according to established hospital protocols.    Conscious sedation:  Conscious sedation was performed according to protocol.  I supervised and directed an independent trained observer with the assistance of monitoring the patient's level of consciousness and physiologic status throughout the procedure.  Intraoperative service time was 90 minutes.    Findings:    Hemodynamics Central aortic pressure systolic 150 and diastolic 82 with a mean pressure of 90 mmHg  LV end-diastolic pressure of 20 mmHg  There was no gradient  across the aortic valve on the pullback of the pigtail catheter  Left Main Large-caliber vessel angiographically normal bifurcates into left into descending and left circumflex arteries  RCA Large-caliber dominant vessel with 100% occlusion in the distal right coronary artery culprit vessel for patient acute coronary syndrome successfully treated with placement of a Xience drug-eluting stent  LAD Large-caliber vessel  LAD has mid focal area of angiographic 80% stenosis at the diagonal bifurcation  Ostium of the diagonal branch has angiographic 50% stenosis  Apical LAD has another focal area of angiographic 50% stenosis with some intramyocardial bridging  Circ Large-caliber vessel mid focal angiographic 30% stenosis otherwise rest of the left circumflex artery is angiographically normal  Marginal branch of left circumflex artery is a large-caliber vessel angiographically normal  LV Not done  Coronary dominance Right coronary artery    Interventions/Vessels Accessible PCI and stenting of the distal right coronary artery with placement of a Xience drug-eluting stent  Guides/Wires BMW and water guidewire  Devices Xience drug-eluting stent 3.5 x 23 mm  Post % Stenosis  0  Pre-procedure CARL flow  0  Post procedure CARL flow  3  Closure Device None  Complications None    Estimated Blood Loss:  Minimal    Specimens: None    Complications:  None; patient tolerated the procedure well.    Disposition: PACU - hemodynamically stable.    Condition: stable    Impressions:  Severe two-vessel coronary artery disease  100% occlusion of the mid right coronary artery culprit vessel for patient acute coronary syndrome and possible arrhythmia and syncope 6 will treat with placement of Xience drug-eluting stent  Significant stenosis involving the mid LAD plan to manage conservatively medical therapy at this time  Echocardiogram to assess LV systolic function    Recommendations:  Aspirin 81 mg p.o. once a day  Brilinta 90 mg p.o. twice  daily  Continue Integrilin for 1 bottle  Continued aggressive risk factor modification  Observe patient in CVU bed overnight  Test results reviewed and discussed with patient and family     Lab Results   Component Value Date    CHOL 236 (H) 08/31/2024    TRIG 156 (H) 08/31/2024    HDL 32 (L) 08/31/2024     (H) 08/31/2024       Results for orders placed during the hospital encounter of 08/30/24    Mobile Cardiac Outpatient Telemetry    Interpretation Summary  Extended Holter monitor study for symptoms of palpitations  Patient was monitored for total 28 days from September 1, 2024 to September 30, 2024  Underlying rhythm is sinus rhythm with a minimal heart rate of 50 bpm maximal heart rate of 144 beats minute average heart rate of 71 bpm  No atrial fibrillation  No sustained ventricular or supraventricular tachyarrhythmia  No high degree AV block  PVC burden of 1%  Relatively benign study  Clinical correlation is recommended           Objective:          Allergies:  No Known Allergies    Medication Review:     Current Outpatient Medications:     atorvastatin (LIPITOR) 40 MG tablet, Take 1 tablet by mouth Every Night., Disp: 90 tablet, Rfl: 1    lisdexamfetamine (Vyvanse) 50 MG capsule, Take 1 capsule by mouth Every Morning, Disp: 30 capsule, Rfl: 0    losartan (Cozaar) 25 MG tablet, Take 1 tablet by mouth Daily., Disp: 30 tablet, Rfl: 3    nitroglycerin (NITROSTAT) 0.4 MG SL tablet, Place 1 tablet under the tongue Every 5 (Five) Minutes As Needed for Chest Pain (Systolic BP Greater Than 100). Take no more than 3 doses in 15 minutes., Disp: 20 tablet, Rfl: 1    ticagrelor (BRILINTA) 90 MG tablet tablet, Take 1 tablet by mouth 2 (Two) Times a Day., Disp: 60 tablet, Rfl: 3    aspirin 81 MG EC tablet, Take 1 tablet by mouth Daily., Disp: , Rfl:     metoprolol tartrate (LOPRESSOR) 25 MG tablet, Take 0.5 tablets by mouth 2 (Two) Times a Day., Disp: 90 tablet, Rfl: 3    Family History:  Family History   Problem  Relation Age of Onset    Cancer Mother         Ovarian    Cancer Father         Prostate    Coronary artery disease Father        Past Medical History:  Past Medical History:   Diagnosis Date    Biceps rupture, distal     Chondromalacia, patella     Elbow fracture, left     Essential (primary) hypertension 10/11/2017    Hearing loss     Obesity 12/21/2015       Past surgical History:  Past Surgical History:   Procedure Laterality Date    CARDIAC CATHETERIZATION N/A 8/30/2024    Procedure: Left Heart Cath;  Surgeon: Stef Hercules MD;  Location: [facility] CATH INVASIVE LOCATION;  Service: Cardiovascular;  Laterality: N/A;    DISTAL BICEPS TENDON REPAIR Right 2/4/2020    Procedure: TENDON DISTAL BICEPS REPAIR;  Surgeon: Richard Berger MD;  Location: [facility] MAIN OR;  Service: Orthopedics;  Laterality: Right;    EAR TUBES      TONSILLECTOMY         Social History:  Social History     Socioeconomic History    Marital status:    Tobacco Use    Smoking status: Former     Types: Electronic Cigarette     Quit date: 2014     Years since quitting: 10.8     Passive exposure: Past    Smokeless tobacco: Never    Tobacco comments:     10-15 times per day   Vaping Use    Vaping status: Former    Quit date: 6/14/2019    Substances: Nicotine   Substance and Sexual Activity    Alcohol use: Not Currently     Comment: rare  2 times per year    Drug use: Never    Sexual activity: Yes     Partners: Female     Birth control/protection: Ring       Review of Systems:  The following systems were reviewed as they relate to the cardiovascular system: Constitutional, Eyes, ENT, Cardiovascular, Respiratory, Gastrointestinal, Integumentary, Neurological, Psychiatric, Hematologic, Endocrine, Musculoskeletal, and Genitourinary. The pertinent cardiovascular findings are reported above with all other cardiovascular points within those systems being negative.    Diagnostic Study Review:     Current  Electrocardiogram:  Procedures          NOTE: The following portions of the patient's history were reviewed and updated this visit as appropriate: allergies, current medications, past family history, past medical history, past social history, past surgical history and problem list.

## 2024-11-14 RX ORDER — LISDEXAMFETAMINE DIMESYLATE 50 MG/1
50 CAPSULE ORAL EVERY MORNING
Qty: 30 CAPSULE | Refills: 0 | Status: SHIPPED | OUTPATIENT
Start: 2024-11-14

## 2024-11-25 ENCOUNTER — OFFICE VISIT (OUTPATIENT)
Dept: FAMILY MEDICINE CLINIC | Facility: CLINIC | Age: 44
End: 2024-11-25
Payer: COMMERCIAL

## 2024-11-25 VITALS
BODY MASS INDEX: 37.92 KG/M2 | HEART RATE: 77 BPM | SYSTOLIC BLOOD PRESSURE: 128 MMHG | DIASTOLIC BLOOD PRESSURE: 80 MMHG | WEIGHT: 286.1 LBS | HEIGHT: 73 IN | RESPIRATION RATE: 14 BRPM | OXYGEN SATURATION: 98 %

## 2024-11-25 DIAGNOSIS — Z98.61 CAD S/P PERCUTANEOUS CORONARY ANGIOPLASTY: ICD-10-CM

## 2024-11-25 DIAGNOSIS — E66.01 CLASS 2 SEVERE OBESITY WITH SERIOUS COMORBIDITY AND BODY MASS INDEX (BMI) OF 37.0 TO 37.9 IN ADULT, UNSPECIFIED OBESITY TYPE: ICD-10-CM

## 2024-11-25 DIAGNOSIS — I25.10 CAD S/P PERCUTANEOUS CORONARY ANGIOPLASTY: ICD-10-CM

## 2024-11-25 DIAGNOSIS — E66.812 CLASS 2 SEVERE OBESITY WITH SERIOUS COMORBIDITY AND BODY MASS INDEX (BMI) OF 37.0 TO 37.9 IN ADULT, UNSPECIFIED OBESITY TYPE: ICD-10-CM

## 2024-11-25 DIAGNOSIS — I24.9 ACUTE CORONARY SYNDROME: Primary | ICD-10-CM

## 2024-11-25 PROCEDURE — 99214 OFFICE O/P EST MOD 30 MIN: CPT | Performed by: PHYSICIAN ASSISTANT

## 2024-11-25 RX ORDER — SEMAGLUTIDE 0.5 MG/.5ML
0.5 INJECTION, SOLUTION SUBCUTANEOUS WEEKLY
Qty: 2 ML | Refills: 2 | Status: SHIPPED | OUTPATIENT
Start: 2024-11-25 | End: 2024-11-25

## 2024-11-25 RX ORDER — SEMAGLUTIDE 0.5 MG/.5ML
0.5 INJECTION, SOLUTION SUBCUTANEOUS WEEKLY
Qty: 2 ML | Refills: 2 | Status: SHIPPED | OUTPATIENT
Start: 2024-11-25 | End: 2025-02-23

## 2024-11-25 RX ORDER — NAPROXEN 500 MG/1
500 TABLET ORAL 2 TIMES DAILY WITH MEALS
Qty: 90 TABLET | Refills: 3 | Status: SHIPPED | OUTPATIENT
Start: 2024-11-25

## 2024-11-25 NOTE — PROGRESS NOTES
"Subjective   Scar Velásquez is a 44 y.o. male.     Chief Complaint   Patient presents with    Weight Loss     MI back in August. Has been trying to loose weight for years. Wanting something to help.       /80 (BP Location: Right arm, Patient Position: Sitting, Cuff Size: Large Adult)   Pulse 77   Resp 14   Ht 185.4 cm (73\")   Wt 130 kg (286 lb 1.6 oz)   SpO2 98%   BMI 37.75 kg/m²     BP Readings from Last 3 Encounters:   11/25/24 128/80   11/12/24 118/75   09/17/24 119/77       Wt Readings from Last 3 Encounters:   11/25/24 130 kg (286 lb 1.6 oz)   11/12/24 131 kg (288 lb)   09/17/24 127 kg (281 lb)       HPI patient presents to the clinic for management of obesity, coronary artery disease, hypertension, and hyperlipidemia.  He had an acute coronary syndrome that happened earlier this year.  He was taken to the hospital and had stent placement.  He has since had more energy since having the stent placed.  He has continued to follow with cardiology and is currently taking atorvastatin, metoprolol, and Brilinta for CAD.  He was cleared to resume Vyvanse if okay by our office.  He is struggling a lot with his weight and cardiology told him that it would help him significantly in terms of his CAD if he could lose weight.  He has tried dieting and exercise in the past but has really struggled with this.  His weight has continued to climb over the years.    The following portions of the patient's history were reviewed and updated as appropriate: allergies, current medications, past family history, past medical history, past social history, past surgical history, and problem list.    Review of Systems    Objective   Physical Exam  Constitutional:       Appearance: He is well-developed. He is obese.   HENT:      Head: Normocephalic and atraumatic.   Eyes:      Conjunctiva/sclera: Conjunctivae normal.      Pupils: Pupils are equal, round, and reactive to light.   Cardiovascular:      Rate and Rhythm: Normal rate " and regular rhythm.      Heart sounds: No murmur heard.  Pulmonary:      Effort: Pulmonary effort is normal.      Breath sounds: Normal breath sounds.   Abdominal:      General: Bowel sounds are normal.      Palpations: Abdomen is soft.      Tenderness: There is no abdominal tenderness.   Musculoskeletal:         General: No deformity. Normal range of motion.      Cervical back: Normal range of motion and neck supple.   Lymphadenopathy:      Cervical: No cervical adenopathy.   Skin:     General: Skin is warm and dry.      Capillary Refill: Capillary refill takes less than 2 seconds.      Findings: No rash.   Neurological:      Mental Status: He is alert and oriented to person, place, and time.      Cranial Nerves: No cranial nerve deficit.      Coordination: Coordination normal.      Gait: Gait normal.   Psychiatric:         Behavior: Behavior normal.         Thought Content: Thought content normal.         Judgment: Judgment normal.           Diagnoses and all orders for this visit:    1. Acute coronary syndrome (Primary)  -     Semaglutide-Weight Management (Wegovy) 0.5 MG/0.5ML solution auto-injector; Inject 0.5 mL under the skin into the appropriate area as directed 1 (One) Time Per Week for 90 days.  Dispense: 2 mL; Refill: 2    2. CAD S/P percutaneous coronary angioplasty  -     Semaglutide-Weight Management (Wegovy) 0.5 MG/0.5ML solution auto-injector; Inject 0.5 mL under the skin into the appropriate area as directed 1 (One) Time Per Week for 90 days.  Dispense: 2 mL; Refill: 2    3. Class 2 severe obesity with serious comorbidity and body mass index (BMI) of 37.0 to 37.9 in adult, unspecified obesity type  -     Semaglutide-Weight Management (Wegovy) 0.5 MG/0.5ML solution auto-injector; Inject 0.5 mL under the skin into the appropriate area as directed 1 (One) Time Per Week for 90 days.  Dispense: 2 mL; Refill: 2    Other orders  -     Discontinue: Semaglutide-Weight Management (Wegovy) 0.5 MG/0.5ML  solution auto-injector; Inject 0.5 mL under the skin into the appropriate area as directed 1 (One) Time Per Week for 90 days.  Dispense: 2 mL; Refill: 2  -     naproxen (Naprosyn) 500 MG tablet; Take 1 tablet by mouth 2 (Two) Times a Day With Meals.  Dispense: 90 tablet; Refill: 3    Due to him having coronary artery disease at such a young age and having trouble with weight it would be of great benefit if we can get him on a GLP-1 to help with both the weight loss and for coronary artery disease prevention.  I am going to start this medication and he will need to follow-up with me in 3 months so that we can reevaluate his weight.  I need him to let me know if he has any side effects and in 1 month if he does not have any side effects we can titrate the dose as needed.  Discussed protein intake, aerobic endurance exercise, and strength training.    Return in about 3 months (around 2/25/2025) for Recheck.

## 2024-12-03 ENCOUNTER — HOSPITAL ENCOUNTER (OUTPATIENT)
Dept: CARDIOLOGY | Facility: HOSPITAL | Age: 44
Discharge: HOME | End: 2024-12-03
Payer: COMMERCIAL

## 2024-12-03 DIAGNOSIS — I25.10 CAD S/P PERCUTANEOUS CORONARY ANGIOPLASTY: ICD-10-CM

## 2024-12-03 DIAGNOSIS — Z98.61 CAD S/P PERCUTANEOUS CORONARY ANGIOPLASTY: ICD-10-CM

## 2024-12-03 DIAGNOSIS — I24.9 ACUTE CORONARY SYNDROME: ICD-10-CM

## 2024-12-03 DIAGNOSIS — I48.0 PAF (PAROXYSMAL ATRIAL FIBRILLATION): ICD-10-CM

## 2024-12-03 DIAGNOSIS — I10 PRIMARY HYPERTENSION: ICD-10-CM

## 2024-12-03 PROCEDURE — 34310000005 TECHNETIUM SESTAMIBI: Performed by: INTERNAL MEDICINE

## 2024-12-03 PROCEDURE — A9500 TC99M SESTAMIBI: HCPCS | Performed by: INTERNAL MEDICINE

## 2024-12-03 PROCEDURE — 78452 HT MUSCLE IMAGE SPECT MULT: CPT

## 2024-12-03 PROCEDURE — 93017 CV STRESS TEST TRACING ONLY: CPT

## 2024-12-03 RX ADMIN — TECHNETIUM TC 99M SESTAMIBI 1 DOSE: 1 INJECTION INTRAVENOUS at 09:45

## 2024-12-03 RX ADMIN — TECHNETIUM TC 99M SESTAMIBI 1 DOSE: 1 INJECTION INTRAVENOUS at 08:13

## 2024-12-06 LAB
BH CV REST NUCLEAR ISOTOPE DOSE: 12.1 MCI
BH CV STRESS BP STAGE 1: NORMAL
BH CV STRESS DURATION MIN STAGE 1: 3
BH CV STRESS DURATION MIN STAGE 2: 3
BH CV STRESS DURATION MIN STAGE 3: 1
BH CV STRESS DURATION SEC STAGE 1: 0
BH CV STRESS DURATION SEC STAGE 2: 0
BH CV STRESS DURATION SEC STAGE 3: 30
BH CV STRESS GRADE STAGE 1: 10
BH CV STRESS GRADE STAGE 2: 12
BH CV STRESS GRADE STAGE 3: 14
BH CV STRESS HR STAGE 1: 121
BH CV STRESS HR STAGE 2: 150
BH CV STRESS HR STAGE 3: 157
BH CV STRESS METS STAGE 1: 5
BH CV STRESS METS STAGE 2: 7.5
BH CV STRESS METS STAGE 3: 10
BH CV STRESS NUCLEAR ISOTOPE DOSE: 35.5 MCI
BH CV STRESS PROTOCOL 1: NORMAL
BH CV STRESS RECOVERY BP: NORMAL MMHG
BH CV STRESS RECOVERY HR: 96 BPM
BH CV STRESS SPEED STAGE 1: 1.7
BH CV STRESS SPEED STAGE 2: 2.5
BH CV STRESS SPEED STAGE 3: 3.4
BH CV STRESS STAGE 1: 1
BH CV STRESS STAGE 2: 2
BH CV STRESS STAGE 3: 3
LV EF NUC BP: 51 %
MAXIMAL PREDICTED HEART RATE: 176 BPM
PERCENT MAX PREDICTED HR: 89.2 %
STRESS BASELINE BP: NORMAL MMHG
STRESS BASELINE HR: 55 BPM
STRESS PERCENT HR: 105 %
STRESS POST ESTIMATED WORKLOAD: 10.1 METS
STRESS POST EXERCISE DUR MIN: 5 MIN
STRESS POST EXERCISE DUR SEC: 28 SEC
STRESS POST PEAK BP: NORMAL MMHG
STRESS POST PEAK HR: 157 BPM
STRESS TARGET HR: 150 BPM

## 2024-12-10 DIAGNOSIS — F90.0 ATTENTION DEFICIT HYPERACTIVITY DISORDER (ADHD), PREDOMINANTLY INATTENTIVE TYPE: ICD-10-CM

## 2024-12-13 RX ORDER — LISDEXAMFETAMINE DIMESYLATE 50 MG/1
50 CAPSULE ORAL EVERY MORNING
Qty: 30 CAPSULE | Refills: 0 | Status: SHIPPED | OUTPATIENT
Start: 2024-12-13

## 2025-01-11 DIAGNOSIS — F90.0 ATTENTION DEFICIT HYPERACTIVITY DISORDER (ADHD), PREDOMINANTLY INATTENTIVE TYPE: ICD-10-CM

## 2025-01-13 RX ORDER — LISDEXAMFETAMINE DIMESYLATE 50 MG/1
50 CAPSULE ORAL EVERY MORNING
Qty: 30 CAPSULE | Refills: 0 | Status: SHIPPED | OUTPATIENT
Start: 2025-01-13

## 2025-02-10 DIAGNOSIS — F90.0 ATTENTION DEFICIT HYPERACTIVITY DISORDER (ADHD), PREDOMINANTLY INATTENTIVE TYPE: ICD-10-CM

## 2025-02-10 RX ORDER — LISDEXAMFETAMINE DIMESYLATE 50 MG/1
50 CAPSULE ORAL EVERY MORNING
Qty: 30 CAPSULE | Refills: 0 | Status: SHIPPED | OUTPATIENT
Start: 2025-02-10

## 2025-02-10 NOTE — TELEPHONE ENCOUNTER
Caller: [email or web address]    Relationship: Emergency Contact    Best call back number: [phone]     Requested Prescriptions:   Requested Prescriptions     Pending Prescriptions Disp Refills    lisdexamfetamine (Vyvanse) 50 MG capsule 30 capsule 0     Sig: Take 1 capsule by mouth Every Morning        Pharmacy where request should be sent: [facility] #[id number] - [facility] [place] [address] [phone] [facility] [phone] FX     Last office visit with prescribing clinician: 11/25/2024   Last telemedicine visit with prescribing clinician: Visit date not found   Next office visit with prescribing clinician: Visit date not found     Does the patient have less than a 3 day supply:  [x] Yes  [] No    Would you like a call back once the refill request has been completed: [] Yes [x] No    If the office needs to give you a call back, can they leave a voicemail: [] Yes [x] No    Zainab Hill Rep   02/10/25 09:47 EST

## 2025-02-18 ENCOUNTER — OFFICE VISIT (OUTPATIENT)
Dept: CARDIOLOGY | Facility: CLINIC | Age: 45
End: 2025-02-18
Payer: COMMERCIAL

## 2025-02-18 VITALS
WEIGHT: 283.6 LBS | SYSTOLIC BLOOD PRESSURE: 122 MMHG | OXYGEN SATURATION: 99 % | DIASTOLIC BLOOD PRESSURE: 85 MMHG | HEART RATE: 76 BPM | HEIGHT: 73 IN | BODY MASS INDEX: 37.59 KG/M2

## 2025-02-18 DIAGNOSIS — I24.9 ACUTE CORONARY SYNDROME: ICD-10-CM

## 2025-02-18 DIAGNOSIS — Z98.61 CAD S/P PERCUTANEOUS CORONARY ANGIOPLASTY: Primary | ICD-10-CM

## 2025-02-18 DIAGNOSIS — I48.0 PAF (PAROXYSMAL ATRIAL FIBRILLATION): ICD-10-CM

## 2025-02-18 DIAGNOSIS — E78.2 MIXED HYPERLIPIDEMIA: ICD-10-CM

## 2025-02-18 DIAGNOSIS — E78.5 HYPERLIPIDEMIA LDL GOAL <100: ICD-10-CM

## 2025-02-18 DIAGNOSIS — I10 PRIMARY HYPERTENSION: ICD-10-CM

## 2025-02-18 DIAGNOSIS — I25.10 CAD S/P PERCUTANEOUS CORONARY ANGIOPLASTY: Primary | ICD-10-CM

## 2025-02-18 RX ORDER — CLOPIDOGREL BISULFATE 75 MG/1
75 TABLET ORAL DAILY
Qty: 90 TABLET | Refills: 3 | Status: SHIPPED | OUTPATIENT
Start: 2025-02-18

## 2025-02-18 NOTE — PROGRESS NOTES
Cardiology Office Visit      Encounter Date:  02/18/2025    Patient ID:   Scar Velásquez is a 44 y.o. male.      Reason For Followup:  Coronary artery disease      Brief Clinical History:  Dear Gaetano Larios PA-C    I had the pleasure of seeing Scar Velásquez today. As you are well aware, this is a 44 y.o. male recently hospitalized after he collapsed at work, suffering cardiac arrest, in which bystander CPR with external AED shocks was performed.  He underwent heart cath s/p PCI with NEETU to the RCA with residual 80% LAD stenosis managed medically at this point, and complicated by atrial fibrillation.  2D echo showed an EF 56-60% with grade 1 diastolic dysfunction and no significant VHD.  PMH includes HTN, HLD, and ADHD.  He does not smoke.             Interval History:    Denies any chest pain  No new cardiac symptoms  No atrial or ventricular arrhythmias  Clinically feels better  No recurrence of atrial fibrillation    Assessment & Plan    Impressions:    Acute coronary syndrome  Shocked by external AED  Syncope  P A-fib currently in sinus rhythm with no recurrence of symptoms  Hyperlipidemia  Severe two-vessel coronary artery disease  100% occlusion of the mid right coronary artery culprit vessel for patient acute coronary syndrome and possible arrhythmia and syncope treated with placement of Xience drug-eluting stent  Significant stenosis involving the mid LAD plan to manage conservatively medical therapy at this time  Echocardiogram to assess LV systolic function  Continued aggressive risk factor modification  High-dose statin  Maykel vascular score of 2    Recommendations:  Patient likes to come off the anticoagulation therapy secondary to significant bleeding and bruising  There is no recurrence of atrial fibrillation  Episode of atrial fibrillation in the setting of acute myocardial infarction with no recurrence of atrial fibrillation  Close monitoring of heart rhythm at home  Okay to discontinue  "Eliquis  Continue aspirin 81 mg p.o. once a day  Patient wants to discontinue use of Brilinta secondary to co-pay issues and prescription coverage  Switch from Brilinta to Plavix  Load with 300 mg of Plavix on day 1 of initiation of Plavix  Patient is complaining of side effects from beta-blocker use  Discontinue metoprolol  Continue aggressive risk factor modification  Prior medical records cath findings reviewed and discussed with patient  Recent labs and workup reviewed and discussed patient  Check CMP and lipids  Continue aggressive risk factor modification  Need for regular exercise and weight loss reviewed and reviewed and discussed with patient  Follow-up in office in 6 months        Vitals:  Vitals:    02/18/25 1441   BP: 122/85   Pulse: 76   SpO2: 99%   Weight: 129 kg (283 lb 9.6 oz)   Height: 185.4 cm (73\")       Physical Exam:    General: Alert, cooperative, no distress, appears stated age  Head:  Normocephalic, atraumatic, mucous membranes moist  Eyes:  Conjunctiva/corneas clear, EOM's intact     Neck:  Supple,  no adenopathy;      Lungs: Clear to auscultation bilaterally, no wheezes rhonchi rales are noted  Chest wall: No tenderness  Heart::  Regular rate and rhythm, S1 and S2 normal, no murmur, rub or gallop  Abdomen: Soft, non-tender, nondistended bowel sounds active  Extremities: No cyanosis, clubbing, or edema  Pulses: 2+ and symmetric all extremities  Skin:  No rashes or lesions  Neuro/psych: A&O x3. CN II through XII are grossly intact with appropriate affect              Lab Results   Component Value Date    GLUCOSE 98 08/31/2024    BUN 12 08/31/2024    CREATININE 1.04 08/31/2024    EGFR 90.8 08/31/2024    BCR 11.5 08/31/2024    K 3.9 08/31/2024    CO2 28.2 08/31/2024    CALCIUM 9.4 08/31/2024    ALBUMIN 4.4 08/30/2024    BILITOT 0.3 08/30/2024     (H) 08/30/2024    ALT 77 (H) 08/30/2024     Results for orders placed during the hospital encounter of 08/30/24    Adult Transthoracic Echo " Complete W/ Cont if Necessary Per Protocol    Interpretation Summary    Left ventricular systolic function is normal. Calculated left ventricular EF = 58% Left ventricular ejection fraction appears to be 56 - 60%.    Left ventricular wall thickness is consistent with mild concentric hypertrophy.    Left ventricular diastolic function is consistent with (grade I) impaired relaxation.    The left atrial cavity is mildly dilated.    Estimated right ventricular systolic pressure from tricuspid regurgitation is normal (<35 mmHg).     Results for orders placed during the hospital encounter of 08/30/24    Cardiac Catheterization/Vascular Study    Conclusion  Table formatting from the original result was not included.  Cardiac Catheterization with PCI Report    Scar Velásquez  [id number]  8/30/2024  @PCP@    He underwent cardiac catheterization and percutaneous coronary intervention.    Indications for the procedure include: acute coronary syndrome.  Acute myocardial infarction  Chest discomfort  External AICD shock for possible ventricular arrhythmia  Syncope  Abnormal troponin  Abnormal EKG    Procedure Details:  The risks, benefits, complications, treatment options, and expected outcomes were discussed with the patient. The patient and/or family concurred with the proposed plan, giving informed consent.    After informed consent the patient was brought to the cath lab after appropriate IV hydration was begun and oral premedication was given. He was further sedated with fentanyl. He was prepped and draped in the usual manner. Using the modified Seldinger access technique, a 6 Persian sheath was placed in the femoral artery. A left heart catheterization with coronary arteriography was performed. Findings are discussed below.    The decision was made to proceed with intervention. He received Heparin as per protocol. The details of the intervention are as follows:    For the interventional procedure we used a JR4 guide  catheter with a BMW guidewire initially and then eventually switched to Prowater wire Entroease rebellion support across the lesion in the distal right coronary artery lesion is predilated with a 3.0 x 12 mm noncompliant balloon up to 8 isabella and lesion is stented with a 3.5 x 23 mm Xience drug-eluting stent that was deployed at 10 isabella with good angiographic results.  Patient tolerated procedure well with no immediate possible complication plan to obtain hemostasis by manual pressure.  Residual anticoagulation was heparin patient received Integrilin bolus and drip at the end of the procedure.  Patient also received 180 mg of Brilinta at the end of the procedure.    After the procedure was completed, sedation was stopped and the sheaths and catheters were all removed according to established hospital protocols.    Conscious sedation:  Conscious sedation was performed according to protocol.  I supervised and directed an independent trained observer with the assistance of monitoring the patient's level of consciousness and physiologic status throughout the procedure.  Intraoperative service time was 90 minutes.    Findings:    Hemodynamics Central aortic pressure systolic 150 and diastolic 82 with a mean pressure of 90 mmHg  LV end-diastolic pressure of 20 mmHg  There was no gradient across the aortic valve on the pullback of the pigtail catheter  Left Main Large-caliber vessel angiographically normal bifurcates into left into descending and left circumflex arteries  RCA Large-caliber dominant vessel with 100% occlusion in the distal right coronary artery culprit vessel for patient acute coronary syndrome successfully treated with placement of a Xience drug-eluting stent  LAD Large-caliber vessel  LAD has mid focal area of angiographic 80% stenosis at the diagonal bifurcation  Ostium of the diagonal branch has angiographic 50% stenosis  Apical LAD has another focal area of angiographic 50% stenosis with some  intramyocardial bridging  Circ Large-caliber vessel mid focal angiographic 30% stenosis otherwise rest of the left circumflex artery is angiographically normal  Marginal branch of left circumflex artery is a large-caliber vessel angiographically normal  LV Not done  Coronary dominance Right coronary artery    Interventions/Vessels Accessible PCI and stenting of the distal right coronary artery with placement of a Xience drug-eluting stent  Guides/Wires BMW and water guidewire  Devices Xience drug-eluting stent 3.5 x 23 mm  Post % Stenosis  0  Pre-procedure CARL flow  0  Post procedure CARL flow  3  Closure Device None  Complications None    Estimated Blood Loss:  Minimal    Specimens: None    Complications:  None; patient tolerated the procedure well.    Disposition: PACU - hemodynamically stable.    Condition: stable    Impressions:  Severe two-vessel coronary artery disease  100% occlusion of the mid right coronary artery culprit vessel for patient acute coronary syndrome and possible arrhythmia and syncope 6 will treat with placement of Xience drug-eluting stent  Significant stenosis involving the mid LAD plan to manage conservatively medical therapy at this time  Echocardiogram to assess LV systolic function    Recommendations:  Aspirin 81 mg p.o. once a day  Brilinta 90 mg p.o. twice daily  Continue Integrilin for 1 bottle  Continued aggressive risk factor modification  Observe patient in CVU bed overnight  Test results reviewed and discussed with patient and family     Lab Results   Component Value Date    CHOL 236 (H) 08/31/2024    TRIG 156 (H) 08/31/2024    HDL 32 (L) 08/31/2024     (H) 08/31/2024      Results for orders placed during the hospital encounter of 12/03/24    Stress Test With Myocardial Perfusion One Day    Interpretation Summary    Exercise myocardial perfusion study shows a moderate size moderate to severe intensity mostly fixed inferior wall perfusion defect with no significant  reversible ischemia    Left ventricular ejection fraction is normal (Calculated EF = 51%).    Clinical correlation is recommended   Results for orders placed during the hospital encounter of 08/30/24    Mobile Cardiac Outpatient Telemetry    Interpretation Summary  Extended Holter monitor study for symptoms of palpitations  Patient was monitored for total 28 days from September 1, 2024 to September 30, 2024  Underlying rhythm is sinus rhythm with a minimal heart rate of 50 bpm maximal heart rate of 144 beats minute average heart rate of 71 bpm  No atrial fibrillation  No sustained ventricular or supraventricular tachyarrhythmia  No high degree AV block  PVC burden of 1%  Relatively benign study  Clinical correlation is recommended           Objective:          Allergies:  No Known Allergies    Medication Review:     Current Outpatient Medications:     aspirin 81 MG EC tablet, Take 1 tablet by mouth Daily., Disp: , Rfl:     atorvastatin (LIPITOR) 40 MG tablet, Take 1 tablet by mouth Every Night., Disp: 90 tablet, Rfl: 1    lisdexamfetamine (Vyvanse) 50 MG capsule, Take 1 capsule by mouth Every Morning, Disp: 30 capsule, Rfl: 0    losartan (Cozaar) 25 MG tablet, Take 1 tablet by mouth Daily., Disp: 30 tablet, Rfl: 3    naproxen (Naprosyn) 500 MG tablet, Take 1 tablet by mouth 2 (Two) Times a Day With Meals., Disp: 90 tablet, Rfl: 3    nitroglycerin (NITROSTAT) 0.4 MG SL tablet, Place 1 tablet under the tongue Every 5 (Five) Minutes As Needed for Chest Pain (Systolic BP Greater Than 100). Take no more than 3 doses in 15 minutes., Disp: 20 tablet, Rfl: 1    Semaglutide-Weight Management (Wegovy) 0.5 MG/0.5ML solution auto-injector, Inject 0.5 mL under the skin into the appropriate area as directed 1 (One) Time Per Week for 90 days., Disp: 2 mL, Rfl: 2    clopidogrel (PLAVIX) 75 MG tablet, Take 1 tablet by mouth Daily., Disp: 90 tablet, Rfl: 3    Family History:  Family History   Problem Relation Age of Onset    Cancer  Mother         Ovarian    Cancer Father         Prostate    Coronary artery disease Father        Past Medical History:  Past Medical History:   Diagnosis Date    Biceps rupture, distal     Chondromalacia, patella     Elbow fracture, left     Essential (primary) hypertension 10/11/2017    Hearing loss     Obesity 2015       Past surgical History:  Past Surgical History:   Procedure Laterality Date    CARDIAC CATHETERIZATION N/A 2024    Procedure: Left Heart Cath;  Surgeon: Stef Hercules MD;  Location: [facility] CATH INVASIVE LOCATION;  Service: Cardiovascular;  Laterality: N/A;    DISTAL BICEPS TENDON REPAIR Right 2020    Procedure: TENDON DISTAL BICEPS REPAIR;  Surgeon: Richard Berger MD;  Location: [facility] MAIN OR;  Service: Orthopedics;  Laterality: Right;    EAR TUBES      TONSILLECTOMY         Social History:  Social History     Socioeconomic History    Marital status:    Tobacco Use    Smoking status: Former     Types: Electronic Cigarette     Quit date:      Years since quittin.1     Passive exposure: Past    Smokeless tobacco: Never    Tobacco comments:     10-15 times per day   Vaping Use    Vaping status: Former    Quit date: 2019    Substances: Nicotine   Substance and Sexual Activity    Alcohol use: Not Currently     Comment: rare  2 times per year    Drug use: Never    Sexual activity: Yes     Partners: Female     Birth control/protection: Ring       Review of Systems:  The following systems were reviewed as they relate to the cardiovascular system: Constitutional, Eyes, ENT, Cardiovascular, Respiratory, Gastrointestinal, Integumentary, Neurological, Psychiatric, Hematologic, Endocrine, Musculoskeletal, and Genitourinary. The pertinent cardiovascular findings are reported above with all other cardiovascular points within those systems being negative.    Diagnostic Study Review:     Current Electrocardiogram:    ECG 12 Lead    Date/Time: 2025 3:25  PM  Performed by: Stef Hercules MD    Authorized by: Mary Qauch APRN  Comparison: compared with previous ECG   Similar to previous ECG  Rhythm: sinus rhythm  Rate: normal  BPM: 76  Conduction: conduction normal  QRS axis: normal  Other findings: T wave abnormality    Clinical impression: abnormal EKG  Comments: Old inferior wall myocardial infarction                NOTE: The following portions of the patient's history were reviewed and updated this visit as appropriate: allergies, current medications, past family history, past medical history, past social history, past surgical history and problem list.

## 2025-03-10 DIAGNOSIS — F90.0 ATTENTION DEFICIT HYPERACTIVITY DISORDER (ADHD), PREDOMINANTLY INATTENTIVE TYPE: ICD-10-CM

## 2025-03-13 RX ORDER — LISDEXAMFETAMINE DIMESYLATE 50 MG/1
50 CAPSULE ORAL EVERY MORNING
Qty: 30 CAPSULE | Refills: 0 | Status: SHIPPED | OUTPATIENT
Start: 2025-03-13

## 2025-04-04 ENCOUNTER — TELEPHONE (OUTPATIENT)
Dept: CARDIOLOGY | Facility: CLINIC | Age: 45
End: 2025-04-04
Payer: COMMERCIAL

## 2025-04-04 RX ORDER — LOSARTAN POTASSIUM 25 MG/1
25 TABLET ORAL DAILY
Qty: 7 TABLET | Refills: 0 | Status: SHIPPED | OUTPATIENT
Start: 2025-04-04

## 2025-04-04 NOTE — TELEPHONE ENCOUNTER
Patient wife called. Staying that the patient has been out of his Losartan since Wednesday. Patient current pharmacy is out till Monday. I advised the patient's wife to call other local pharmacies in the area. To see if they have any in stock. That way I can send in some till he is able to get the full scripts from his local pharmacy. Patient's wife is to call me back with what pharmacy has it in stock.

## 2025-04-04 NOTE — TELEPHONE ENCOUNTER
Called and spoke with the patient's wife. Informing her that I sent in a weeks worth of medication in to the [facility] in [facility]. Patient's wife acknowledged this information.

## 2025-04-14 DIAGNOSIS — F90.0 ATTENTION DEFICIT HYPERACTIVITY DISORDER (ADHD), PREDOMINANTLY INATTENTIVE TYPE: ICD-10-CM

## 2025-04-14 RX ORDER — LISDEXAMFETAMINE DIMESYLATE 50 MG/1
50 CAPSULE ORAL EVERY MORNING
Qty: 30 CAPSULE | Refills: 0 | Status: SHIPPED | OUTPATIENT
Start: 2025-04-14

## 2025-04-14 NOTE — TELEPHONE ENCOUNTER
Caller: JESS RUGGIERO    Relationship: Emergency Contact    Best call back number: [phone]    Requested Prescriptions:   Requested Prescriptions     Pending Prescriptions Disp Refills    lisdexamfetamine (Vyvanse) 50 MG capsule 30 capsule 0     Sig: Take 1 capsule by mouth Every Morning        Pharmacy where request should be sent: [facility]/PHARMACY #[id number] - [facility] [place] [address] [phone] [facility] [phone] FX     Last office visit with prescribing clinician: 11/25/2024   Last telemedicine visit with prescribing clinician: Visit date not found   Next office visit with prescribing clinician: Visit date not found     Additional details provided by patient:     Does the patient have less than a 3 day supply:  [x] Yes  [] No    Would you like a call back once the refill request has been completed: [] Yes [x] No    If the office needs to give you a call back, can they leave a voicemail: [] Yes [x] No    Zainab Valencia   04/14/25 12:03 EDT

## 2025-04-18 ENCOUNTER — TELEPHONE (OUTPATIENT)
Dept: FAMILY MEDICINE CLINIC | Facility: CLINIC | Age: 45
End: 2025-04-18
Payer: COMMERCIAL

## 2025-04-18 NOTE — TELEPHONE ENCOUNTER
Caller: JESS RUGGIERO    Relationship to patient: Emergency Contact    Best call back number: [phone] (WIFE)    Patient is needing:   JESS CALLED AND STATED THAT PATIENT NO LONGER HAVING BAD SIDE AFFECTS WITH HIS WEGOVY SO HIS DOSAGE CAN BE INCREASED. HE STILL HAS ONE SHOT LEFT.    JESS IS NOT ON A VALID BH VERBAL.

## 2025-04-21 RX ORDER — SEMAGLUTIDE 1 MG/.5ML
1 INJECTION, SOLUTION SUBCUTANEOUS WEEKLY
Qty: 2 ML | Refills: 2 | Status: SHIPPED | OUTPATIENT
Start: 2025-04-21 | End: 2025-07-20

## 2025-05-01 RX ORDER — LOSARTAN POTASSIUM 25 MG/1
25 TABLET ORAL DAILY
Qty: 90 TABLET | Refills: 0 | Status: SHIPPED | OUTPATIENT
Start: 2025-05-01

## 2025-05-09 ENCOUNTER — OFFICE VISIT (OUTPATIENT)
Dept: FAMILY MEDICINE CLINIC | Facility: CLINIC | Age: 45
End: 2025-05-09
Payer: COMMERCIAL

## 2025-05-09 ENCOUNTER — LAB (OUTPATIENT)
Dept: FAMILY MEDICINE CLINIC | Facility: CLINIC | Age: 45
End: 2025-05-09
Payer: COMMERCIAL

## 2025-05-09 VITALS
BODY MASS INDEX: 34.06 KG/M2 | OXYGEN SATURATION: 99 % | DIASTOLIC BLOOD PRESSURE: 72 MMHG | SYSTOLIC BLOOD PRESSURE: 128 MMHG | HEIGHT: 73 IN | HEART RATE: 97 BPM | WEIGHT: 257 LBS | RESPIRATION RATE: 14 BRPM

## 2025-05-09 DIAGNOSIS — E66.811 CLASS 1 OBESITY DUE TO EXCESS CALORIES WITH SERIOUS COMORBIDITY AND BODY MASS INDEX (BMI) OF 33.0 TO 33.9 IN ADULT: ICD-10-CM

## 2025-05-09 DIAGNOSIS — Z98.61 CAD S/P PERCUTANEOUS CORONARY ANGIOPLASTY: ICD-10-CM

## 2025-05-09 DIAGNOSIS — E78.5 HYPERLIPIDEMIA LDL GOAL <100: ICD-10-CM

## 2025-05-09 DIAGNOSIS — I25.10 CAD S/P PERCUTANEOUS CORONARY ANGIOPLASTY: ICD-10-CM

## 2025-05-09 DIAGNOSIS — E78.2 MIXED HYPERLIPIDEMIA: ICD-10-CM

## 2025-05-09 DIAGNOSIS — E66.09 CLASS 1 OBESITY DUE TO EXCESS CALORIES WITH SERIOUS COMORBIDITY AND BODY MASS INDEX (BMI) OF 33.0 TO 33.9 IN ADULT: ICD-10-CM

## 2025-05-09 DIAGNOSIS — I24.9 ACUTE CORONARY SYNDROME: ICD-10-CM

## 2025-05-09 DIAGNOSIS — Z00.00 PHYSICAL EXAM: Primary | ICD-10-CM

## 2025-05-09 DIAGNOSIS — F90.0 ATTENTION DEFICIT HYPERACTIVITY DISORDER (ADHD), PREDOMINANTLY INATTENTIVE TYPE: ICD-10-CM

## 2025-05-09 DIAGNOSIS — I10 PRIMARY HYPERTENSION: ICD-10-CM

## 2025-05-09 DIAGNOSIS — I48.0 PAF (PAROXYSMAL ATRIAL FIBRILLATION): ICD-10-CM

## 2025-05-09 PROCEDURE — 85025 COMPLETE CBC W/AUTO DIFF WBC: CPT | Performed by: INTERNAL MEDICINE

## 2025-05-09 PROCEDURE — 80061 LIPID PANEL: CPT | Performed by: INTERNAL MEDICINE

## 2025-05-09 PROCEDURE — 80053 COMPREHEN METABOLIC PANEL: CPT | Performed by: INTERNAL MEDICINE

## 2025-05-09 PROCEDURE — 36415 COLL VENOUS BLD VENIPUNCTURE: CPT

## 2025-05-09 RX ORDER — LISDEXAMFETAMINE DIMESYLATE 50 MG/1
50 CAPSULE ORAL EVERY MORNING
Qty: 30 CAPSULE | Refills: 0 | Status: SHIPPED | OUTPATIENT
Start: 2025-05-09

## 2025-05-09 NOTE — PROGRESS NOTES
"Ede Velásquez is a 44 y.o. male.     Chief Complaint   Patient presents with    Annual Exam     Body aches       /72 (BP Location: Left arm, Patient Position: Sitting, Cuff Size: Adult)   Pulse 97   Resp 14   Ht 185.4 cm (73\")   Wt 117 kg (257 lb)   SpO2 99%   BMI 33.91 kg/m²     BP Readings from Last 3 Encounters:   05/09/25 128/72   02/18/25 122/85   11/25/24 128/80       Wt Readings from Last 3 Encounters:   05/09/25 117 kg (257 lb)   02/18/25 129 kg (283 lb 9.6 oz)   11/25/24 130 kg (286 lb 1.6 oz)       HPI Presents to the clinic for annual physical exam. He has been doing better over the past year after having his recent cad event with stenting. He has  been working on dieting and exercise. Over the past 1 year there has been new medication changes. He is taking wegovy, losartan, plavix, lipitor. Health maintenance screenings are utd. He is currently seeing cardiology.       The following portions of the patient's history were reviewed and updated as appropriate: allergies, current medications, past family history, past medical history, past social history, past surgical history, and problem list.    Review of Systems    Objective   Physical Exam  Constitutional:       Appearance: He is well-developed.   HENT:      Head: Normocephalic and atraumatic.   Eyes:      Conjunctiva/sclera: Conjunctivae normal.      Pupils: Pupils are equal, round, and reactive to light.   Cardiovascular:      Rate and Rhythm: Normal rate and regular rhythm.      Heart sounds: No murmur heard.  Pulmonary:      Effort: Pulmonary effort is normal.      Breath sounds: Normal breath sounds.   Abdominal:      General: Bowel sounds are normal.      Palpations: Abdomen is soft.      Tenderness: There is no abdominal tenderness.   Musculoskeletal:         General: No deformity. Normal range of motion.      Cervical back: Normal range of motion and neck supple.   Lymphadenopathy:      Cervical: No cervical adenopathy. "   Skin:     General: Skin is warm and dry.      Capillary Refill: Capillary refill takes less than 2 seconds.      Findings: No rash.   Neurological:      Mental Status: He is alert and oriented to person, place, and time.      Cranial Nerves: No cranial nerve deficit.      Coordination: Coordination normal.      Gait: Gait normal.   Psychiatric:         Behavior: Behavior normal.         Thought Content: Thought content normal.         Judgment: Judgment normal.           Diagnoses and all orders for this visit:    1. Physical exam (Primary)    2. CAD S/P percutaneous coronary angioplasty    3. Primary hypertension    4. Attention deficit hyperactivity disorder (ADHD), predominantly inattentive type  -     lisdexamfetamine (Vyvanse) 50 MG capsule; Take 1 capsule by mouth Every Morning  Dispense: 30 capsule; Refill: 0    5. Class 1 obesity due to excess calories with serious comorbidity and body mass index (BMI) of 33.0 to 33.9 in adult    Wegovy 1mg see how he tolerates this.   Vyvanse refill  Continue htn and hld.   Healthy eating habits. Low saturated fats. High plant based. Avoid processed foods. 15-30 min of cardiovascular exercise 5 days per week with atleast 2-3 days of resistance training.       Return in about 1 year (around 5/9/2026), or if symptoms worsen or fail to improve, for Annual physical.

## 2025-05-10 LAB
ALBUMIN SERPL-MCNC: 4.6 G/DL (ref 3.5–5.2)
ALBUMIN/GLOB SERPL: 1.7 G/DL
ALP SERPL-CCNC: 68 U/L (ref 39–117)
ALT SERPL W P-5'-P-CCNC: 26 U/L (ref 1–41)
ANION GAP SERPL CALCULATED.3IONS-SCNC: 10.1 MMOL/L (ref 5–15)
AST SERPL-CCNC: 26 U/L (ref 1–40)
BASOPHILS # BLD AUTO: 0.09 10*3/MM3 (ref 0–0.2)
BASOPHILS NFR BLD AUTO: 1 % (ref 0–1.5)
BILIRUB SERPL-MCNC: 0.6 MG/DL (ref 0–1.2)
BUN SERPL-MCNC: 12 MG/DL (ref 6–20)
BUN/CREAT SERPL: 10.3 (ref 7–25)
CALCIUM SPEC-SCNC: 9.8 MG/DL (ref 8.6–10.5)
CHLORIDE SERPL-SCNC: 100 MMOL/L (ref 98–107)
CHOLEST SERPL-MCNC: 108 MG/DL (ref 0–200)
CO2 SERPL-SCNC: 26.9 MMOL/L (ref 22–29)
CREAT SERPL-MCNC: 1.16 MG/DL (ref 0.76–1.27)
DEPRECATED RDW RBC AUTO: 37.4 FL (ref 37–54)
EGFRCR SERPLBLD CKD-EPI 2021: 79.6 ML/MIN/1.73
EOSINOPHIL # BLD AUTO: 0.22 10*3/MM3 (ref 0–0.4)
EOSINOPHIL NFR BLD AUTO: 2.4 % (ref 0.3–6.2)
ERYTHROCYTE [DISTWIDTH] IN BLOOD BY AUTOMATED COUNT: 12.3 % (ref 12.3–15.4)
GLOBULIN UR ELPH-MCNC: 2.7 GM/DL
GLUCOSE SERPL-MCNC: 80 MG/DL (ref 65–99)
HCT VFR BLD AUTO: 42.5 % (ref 37.5–51)
HDLC SERPL-MCNC: 38 MG/DL (ref 40–60)
HGB BLD-MCNC: 14.5 G/DL (ref 13–17.7)
IMM GRANULOCYTES # BLD AUTO: 0.03 10*3/MM3 (ref 0–0.05)
IMM GRANULOCYTES NFR BLD AUTO: 0.3 % (ref 0–0.5)
LDLC SERPL CALC-MCNC: 57 MG/DL (ref 0–100)
LDLC/HDLC SERPL: 1.55 {RATIO}
LYMPHOCYTES # BLD AUTO: 2.04 10*3/MM3 (ref 0.7–3.1)
LYMPHOCYTES NFR BLD AUTO: 22.2 % (ref 19.6–45.3)
MCH RBC QN AUTO: 28.7 PG (ref 26.6–33)
MCHC RBC AUTO-ENTMCNC: 34.1 G/DL (ref 31.5–35.7)
MCV RBC AUTO: 84 FL (ref 79–97)
MONOCYTES # BLD AUTO: 0.66 10*3/MM3 (ref 0.1–0.9)
MONOCYTES NFR BLD AUTO: 7.2 % (ref 5–12)
NEUTROPHILS NFR BLD AUTO: 6.14 10*3/MM3 (ref 1.7–7)
NEUTROPHILS NFR BLD AUTO: 66.9 % (ref 42.7–76)
NRBC BLD AUTO-RTO: 0 /100 WBC (ref 0–0.2)
PLATELET # BLD AUTO: 274 10*3/MM3 (ref 140–450)
PMV BLD AUTO: 11.6 FL (ref 6–12)
POTASSIUM SERPL-SCNC: 4.7 MMOL/L (ref 3.5–5.2)
PROT SERPL-MCNC: 7.3 G/DL (ref 6–8.5)
RBC # BLD AUTO: 5.06 10*6/MM3 (ref 4.14–5.8)
SODIUM SERPL-SCNC: 137 MMOL/L (ref 136–145)
TRIGL SERPL-MCNC: 55 MG/DL (ref 0–150)
VLDLC SERPL-MCNC: 13 MG/DL (ref 5–40)
WBC NRBC COR # BLD AUTO: 9.18 10*3/MM3 (ref 3.4–10.8)

## 2025-05-12 ENCOUNTER — TELEPHONE (OUTPATIENT)
Dept: FAMILY MEDICINE CLINIC | Facility: CLINIC | Age: 45
End: 2025-05-12
Payer: COMMERCIAL

## 2025-05-12 NOTE — TELEPHONE ENCOUNTER
Caller: JESS RUGGIERO    Relationship: Emergency Contact    Best call back number: [phone]    Which medication are you concerned about: JENNIFER    Who prescribed you this medication: Gaetano Zhu PA-C      When did you start taking this medication:     What are your concerns: THE PHARMACY CALLED PATIENT AND ADVISED THEY WERE FAXING OVER PAPERWORK TO REQUEST AN UPDATED PRESCRIPTION.     How long have you had these concerns:

## 2025-05-13 ENCOUNTER — RESULTS FOLLOW-UP (OUTPATIENT)
Dept: FAMILY MEDICINE CLINIC | Facility: CLINIC | Age: 45
End: 2025-05-13
Payer: COMMERCIAL

## 2025-05-13 NOTE — TELEPHONE ENCOUNTER
Called and left a detailed message on the patient's results on his lab's. I told the patient to call the office back if he has any questions.     ----- Message from Stef Hrecules sent at 5/13/2025 11:06 AM EDT -----  Lipid levels are much better  ----- Message -----  From: Lab, Background User  Sent: 5/10/2025  12:11 AM EDT  To: Stef Hercules MD

## 2025-05-15 ENCOUNTER — TELEPHONE (OUTPATIENT)
Dept: FAMILY MEDICINE CLINIC | Facility: CLINIC | Age: 45
End: 2025-05-15
Payer: COMMERCIAL

## 2025-05-15 DIAGNOSIS — F90.0 ATTENTION DEFICIT HYPERACTIVITY DISORDER (ADHD), PREDOMINANTLY INATTENTIVE TYPE: ICD-10-CM

## 2025-05-15 NOTE — TELEPHONE ENCOUNTER
Caller: JESS RUGGIERO    Relationship: Emergency Contact    Best call back number: [phone]    What medication are you requesting: ALTERNATIVE TO lisdexamfetamine (Vyvanse) 50 MG capsule     What are your current symptoms: ADD     How long have you been experiencing symptoms:     Have you had these symptoms before:      [x] Yes  [] No    Have you been treated for these symptoms before:     [x] Yes  [] No    If a prescription is needed, what is your preferred pharmacy and phone number:  [facility]/pharmacy #[id number] - [facility] [place] [address] [phone] [place] [phone]      Additional notes: PHARMACY WAS ON BACK ORDER FOR FIRST REQUEST

## 2025-05-22 RX ORDER — NAPROXEN 500 MG/1
500 TABLET ORAL 2 TIMES DAILY WITH MEALS
Qty: 90 TABLET | Refills: 3 | Status: SHIPPED | OUTPATIENT
Start: 2025-05-22

## 2025-05-22 RX ORDER — LISDEXAMFETAMINE DIMESYLATE 50 MG/1
50 CAPSULE ORAL EVERY MORNING
Qty: 30 CAPSULE | Refills: 0 | Status: SHIPPED | OUTPATIENT
Start: 2025-05-22

## 2025-05-28 ENCOUNTER — TELEPHONE (OUTPATIENT)
Dept: FAMILY MEDICINE CLINIC | Facility: CLINIC | Age: 45
End: 2025-05-28
Payer: COMMERCIAL

## 2025-05-28 NOTE — TELEPHONE ENCOUNTER
Hub staff attempted to follow warm transfer process and was unsuccessful     Caller: JESS RUGGIERO    Relationship to patient: Emergency Contact    Best call back number: [phone]    Patient is needing: PT'S WIFE CALLED TO GET ALTERNATIVE FOR lisdexamfetamine (Vyvanse) 50 MG capsule DUE TO IT BEING OUT OF STOCK, BUT THE SAME MEDICATION WAS SENT IN AGAIN. NO ALTERNATIVE. SEE PREVIOUS SIGNED ENCOUNTER AND ADVISE.

## 2025-05-30 DIAGNOSIS — F90.0 ATTENTION DEFICIT HYPERACTIVITY DISORDER (ADHD), PREDOMINANTLY INATTENTIVE TYPE: Primary | ICD-10-CM

## 2025-05-30 RX ORDER — SERDEXMETHYLPHENIDATE AND DEXMETHYLPHENIDATE 7.8; 39.2 MG/1; MG/1
1 CAPSULE ORAL DAILY
Qty: 30 CAPSULE | Refills: 0 | Status: SHIPPED | OUTPATIENT
Start: 2025-05-30

## 2025-06-16 DIAGNOSIS — F90.0 ATTENTION DEFICIT HYPERACTIVITY DISORDER (ADHD), PREDOMINANTLY INATTENTIVE TYPE: ICD-10-CM

## 2025-06-16 RX ORDER — LISDEXAMFETAMINE DIMESYLATE 50 MG/1
50 CAPSULE ORAL EVERY MORNING
Qty: 30 CAPSULE | Refills: 0 | Status: CANCELLED | OUTPATIENT
Start: 2025-06-16

## 2025-06-16 RX ORDER — ATORVASTATIN CALCIUM 40 MG/1
40 TABLET, FILM COATED ORAL NIGHTLY
Qty: 90 TABLET | Refills: 1 | Status: SHIPPED | OUTPATIENT
Start: 2025-06-16

## 2025-06-16 NOTE — TELEPHONE ENCOUNTER
Caller: JESS RUGGIERO    Relationship: Emergency Contact    Best call back number:  [phone]    Requested Prescriptions:   Requested Prescriptions     Pending Prescriptions Disp Refills    lisdexamfetamine (Vyvanse) 50 MG capsule 30 capsule 0     Sig: Take 1 capsule by mouth Every Morning    atorvastatin (LIPITOR) 40 MG tablet 90 tablet 1     Sig: Take 1 tablet by mouth Every Night.        Pharmacy where request should be sent: [facility]/PHARMACY #[id number] - [address] [phone] [facility] [phone]      Last office visit with prescribing clinician: 2/18/2025   Last telemedicine visit with prescribing clinician: Visit date not found   Next office visit with prescribing clinician: 8/19/2025     Additional details provided by patient: PATIENT REQUESTING PENDYALA TO TAKE OVER MEDICATIONS.     Does the patient have less than a 3 day supply:  [] Yes  [] No    Would you like a call back once the refill request has been completed: [] Yes [] No    If the office needs to give you a call back, can they leave a voicemail: [] Yes [] No    Zainab Dominguez Rep   06/16/25 15:22 EDT

## 2025-07-23 DIAGNOSIS — F90.0 ATTENTION DEFICIT HYPERACTIVITY DISORDER (ADHD), PREDOMINANTLY INATTENTIVE TYPE: ICD-10-CM

## 2025-07-23 NOTE — TELEPHONE ENCOUNTER
Caller: Teja Velásquezromulo PACHECO    Relationship: Self    Best call back number: [phone]    Requested Prescriptions:   Requested Prescriptions     Pending Prescriptions Disp Refills    lisdexamfetamine (Vyvanse) 50 MG capsule 30 capsule 0     Sig: Take 1 capsule by mouth Every Morning        Pharmacy where request should be sent: [facility]/PHARMACY #[id number] - [facility] [place] [address] [phone] [facility] [phone] FX     Last office visit with prescribing clinician: 5/9/2025   Last telemedicine visit with prescribing clinician: Visit date not found   Next office visit with prescribing clinician: Visit date not found     Additional details provided by patient: PATIENTS WIFE STATED THE PATIENT HAS 5 DAYS LEFT    Does the patient have less than a 3 day supply:  [] Yes  [x] No    Would you like a call back once the refill request has been completed: [] Yes [x] No    If the office needs to give you a call back, can they leave a voicemail: [] Yes [x] No    Zainab Alvares Rep   07/23/25 11:02 EDT

## 2025-07-24 RX ORDER — LISDEXAMFETAMINE DIMESYLATE 50 MG/1
50 CAPSULE ORAL EVERY MORNING
Qty: 30 CAPSULE | Refills: 0 | Status: SHIPPED | OUTPATIENT
Start: 2025-07-24

## 2025-07-29 RX ORDER — LOSARTAN POTASSIUM 25 MG/1
25 TABLET ORAL DAILY
Qty: 90 TABLET | Refills: 0 | Status: SHIPPED | OUTPATIENT
Start: 2025-07-29

## 2025-08-11 RX ORDER — SEMAGLUTIDE 1 MG/.5ML
INJECTION, SOLUTION SUBCUTANEOUS
Qty: 2 ML | Refills: 2 | Status: SHIPPED | OUTPATIENT
Start: 2025-08-11

## 2025-08-19 ENCOUNTER — OFFICE VISIT (OUTPATIENT)
Dept: CARDIOLOGY | Facility: CLINIC | Age: 45
End: 2025-08-19
Payer: COMMERCIAL

## 2025-08-19 VITALS
OXYGEN SATURATION: 97 % | RESPIRATION RATE: 16 BRPM | HEART RATE: 69 BPM | HEIGHT: 73 IN | DIASTOLIC BLOOD PRESSURE: 70 MMHG | BODY MASS INDEX: 30.09 KG/M2 | SYSTOLIC BLOOD PRESSURE: 104 MMHG | WEIGHT: 227 LBS

## 2025-08-19 DIAGNOSIS — E78.2 MIXED HYPERLIPIDEMIA: ICD-10-CM

## 2025-08-19 DIAGNOSIS — I25.10 CAD S/P PERCUTANEOUS CORONARY ANGIOPLASTY: Primary | ICD-10-CM

## 2025-08-19 DIAGNOSIS — Z98.61 CAD S/P PERCUTANEOUS CORONARY ANGIOPLASTY: Primary | ICD-10-CM

## 2025-08-19 DIAGNOSIS — I10 PRIMARY HYPERTENSION: ICD-10-CM

## 2025-08-19 PROCEDURE — 93000 ELECTROCARDIOGRAM COMPLETE: CPT | Performed by: INTERNAL MEDICINE

## 2025-08-19 PROCEDURE — 99214 OFFICE O/P EST MOD 30 MIN: CPT | Performed by: INTERNAL MEDICINE

## 2025-08-19 RX ORDER — FAMOTIDINE 20 MG/1
20 TABLET, FILM COATED ORAL AS NEEDED
COMMUNITY

## 2025-08-19 RX ORDER — EZETIMIBE 10 MG/1
10 TABLET ORAL DAILY
Qty: 30 TABLET | Refills: 0 | Status: SHIPPED | OUTPATIENT
Start: 2025-08-19

## 2025-08-26 DIAGNOSIS — F90.0 ATTENTION DEFICIT HYPERACTIVITY DISORDER (ADHD), PREDOMINANTLY INATTENTIVE TYPE: ICD-10-CM

## 2025-08-28 RX ORDER — LISDEXAMFETAMINE DIMESYLATE 50 MG/1
50 CAPSULE ORAL EVERY MORNING
Qty: 30 CAPSULE | Refills: 0 | Status: SHIPPED | OUTPATIENT
Start: 2025-08-28

## (undated) DEVICE — INTENDED FOR TISSUE SEPARATION, AND OTHER PROCEDURES THAT REQUIRE A SHARP SURGICAL BLADE TO PUNCTURE OR CUT.: Brand: BARD-PARKER ® CARBON RIB-BACK BLADES

## (undated) DEVICE — ELECTRD DEFIB M/FUNC PROPADZ RADIOL 2PK

## (undated) DEVICE — BOWL PLSTC MD 16OZ BLU STRL

## (undated) DEVICE — SYR LL TP 10ML STRL

## (undated) DEVICE — PK EXTREM 50

## (undated) DEVICE — APPL CHLORAPREP W/TINT 10.5ML ORNG

## (undated) DEVICE — PAD CAST SPECIST 4IN STRL

## (undated) DEVICE — PK TRY HEART CATH 50

## (undated) DEVICE — CATH DIAG IMPULSE PIG .056 6F 110CM

## (undated) DEVICE — CUSTOM PACK: Brand: UNBRANDED

## (undated) DEVICE — STPCK 3WY HP ROT

## (undated) DEVICE — PINNACLE INTRODUCER SHEATH: Brand: PINNACLE

## (undated) DEVICE — MEDICINE CUP, GRADUATED, STER: Brand: MEDLINE

## (undated) DEVICE — DRSNG TELFA PAD NONADH STR 1S 3X4IN

## (undated) DEVICE — DGW .035 FC J3MM 150CM TEF HEP: Brand: EMERALD

## (undated) DEVICE — GLV SURG TRIUMPH LT PF LTX 8.5 STRL

## (undated) DEVICE — TBG NAMIC PRESS MONTR A/ F/M 12IN

## (undated) DEVICE — NC TREK NEO™ CORONARY DILATATION CATHETER 3.00 MM X 12 MM / RAPID-EXCHANGE: Brand: NC TREK NEO™

## (undated) DEVICE — CATH DIAG IMPULSE FR4 6F 100CM

## (undated) DEVICE — CATH DIAG IMPULSE FL4 6F 100CM

## (undated) DEVICE — ARM SLING: Brand: DEROYAL

## (undated) DEVICE — GOWN,REINFORCE,POLY,SIRUS,BREATH SLV,XLG: Brand: MEDLINE

## (undated) DEVICE — ST ACC MICROPUNCTURE STFF/CANN PLAT/TP 4F 21G 40CM

## (undated) DEVICE — NDL SUT CATGUT 1/2CIR TPR SZ3 2PK 1824-3D

## (undated) DEVICE — HI-TORQUE BALANCE MIDDLEWEIGHT UNIVERSAL II GUIDE WIRE STRAIGHT TIP PAK  190 CM: Brand: HI-TORQUE BALANCE MIDDLEWEIGHT UNIVERSAL II

## (undated) DEVICE — SOL IRRIG NACL 9PCT 1000ML BTL

## (undated) DEVICE — GLV SURG TRIUMPH ORTHO W/ALOE PF LTX 8 STRL

## (undated) DEVICE — DRSNG SURESITE WNDW 4X4.5

## (undated) DEVICE — GLV SURG TRIUMPH GREEN W/ALOE PF LTX 8.5 STRL

## (undated) DEVICE — CONTRST ISOVUE300 61PCT 50ML

## (undated) DEVICE — Device: Brand: PROWATERFLEX

## (undated) DEVICE — PENCL EVAC ULTRAVAC SMOKE W/BLD

## (undated) DEVICE — SKIN AFFIX SURG ADHESIVE 72/CS 0.55ML: Brand: MEDLINE

## (undated) DEVICE — GUIDE CATHETER: Brand: MACH1™

## (undated) DEVICE — PK PROC TURNOVER

## (undated) DEVICE — SUT VIC 2/0 CT2 27IN J269H

## (undated) DEVICE — 3M™ IOBAN™ 2 ANTIMICROBIAL INCISE DRAPE 6650EZ: Brand: IOBAN™ 2

## (undated) DEVICE — SUT VIC 3/0 SH 27IN J416H

## (undated) DEVICE — SOL IRRIG H2O 1000ML STRL

## (undated) DEVICE — DEV INFL COMPAK W/ACCESSPLUS IN4530

## (undated) DEVICE — SUT MONOCRYL 4/0 PS2 27IN Y426H ETY426H